# Patient Record
Sex: FEMALE | Race: WHITE | NOT HISPANIC OR LATINO | Employment: OTHER | URBAN - METROPOLITAN AREA
[De-identification: names, ages, dates, MRNs, and addresses within clinical notes are randomized per-mention and may not be internally consistent; named-entity substitution may affect disease eponyms.]

---

## 2017-07-20 ENCOUNTER — OFFICE VISIT (OUTPATIENT)
Dept: FAMILY MEDICINE | Facility: CLINIC | Age: 62
End: 2017-07-20
Payer: COMMERCIAL

## 2017-07-20 ENCOUNTER — HOSPITAL ENCOUNTER (OUTPATIENT)
Dept: ULTRASOUND IMAGING | Facility: CLINIC | Age: 62
Discharge: HOME | End: 2017-07-20
Attending: FAMILY MEDICINE | Admitting: FAMILY MEDICINE
Payer: COMMERCIAL

## 2017-07-20 VITALS
WEIGHT: 199.6 LBS | HEIGHT: 70 IN | DIASTOLIC BLOOD PRESSURE: 79 MMHG | HEART RATE: 81 BPM | TEMPERATURE: 97.6 F | SYSTOLIC BLOOD PRESSURE: 136 MMHG | BODY MASS INDEX: 28.58 KG/M2

## 2017-07-20 DIAGNOSIS — M79.651 PAIN IN RIGHT THIGH: Primary | ICD-10-CM

## 2017-07-20 DIAGNOSIS — M79.651 PAIN IN RIGHT THIGH: ICD-10-CM

## 2017-07-20 DIAGNOSIS — I80.01 SUPERFICIAL PHLEBITIS AND THROMBOPHLEBITIS OF RIGHT LOWER EXTREMITY: ICD-10-CM

## 2017-07-20 PROCEDURE — 99214 OFFICE O/P EST MOD 30 MIN: CPT | Performed by: FAMILY MEDICINE

## 2017-07-20 PROCEDURE — 93971 EXTREMITY STUDY: CPT | Mod: RT

## 2017-07-20 NOTE — NURSING NOTE
"Chief Complaint   Patient presents with     Bleeding/Bruising     Patient states that she has a vein that has a bump on it with bruising around it.       Initial /79  Pulse 81  Temp 97.6  F (36.4  C) (Tympanic)  Ht 5' 10\" (1.778 m)  Wt 199 lb 9.6 oz (90.5 kg)  BMI 28.64 kg/m2 Estimated body mass index is 28.64 kg/(m^2) as calculated from the following:    Height as of this encounter: 5' 10\" (1.778 m).    Weight as of this encounter: 199 lb 9.6 oz (90.5 kg).  Medication Reconciliation: complete   Betty Dove CMA      "

## 2017-07-20 NOTE — PATIENT INSTRUCTIONS
Superficial Thrombophlebitis    The superficial veins are the veins near the surface of the skin. Superficial thrombophlebitis is a problem that occurs when one or more of these veins become inflamed (red, irritated, and swollen). This is most often due to a blood clot.  Causes  The problem may occur after injury to a vein. It may also occur after having an intravenous (IV) line placed. Other factors that can make the problem more likely include:    Varicose veins    Venous insufficiency    Bleeding disorders    Prolonged periods of rest and not moving around    IV drug abuse  Symptoms  Symptoms may appear in the affected area. They can include:    Pain    Tenderness    Redness    Warmth    Swelling    Hardening of the vein  In most cases, superficial thrombophlebitis resolves on its own with no problems. Treatment is focused on relieving symptoms.  Sometimes, there is a risk that the deep veins in the body may also be involved. This can lead to more serious problems. In such cases, further testing and treatments may be needed. Your healthcare provider can tell you more about this.  Home Care  To help relieve pain and swelling, you may be advised to:    Apply heat or cold to the affected area. Do this for up to 10 minutes as often as directed.    Heat: Use a warm compress, such as a heating pad.    Cold: Use a cold compress, such as a cold pack or bag of ice wrapped in a thin towel.    Take nonsteroidal anti-inflammatory drugs (NSAIDS), such as ibuprofen. In some cases, other pain medicines may be prescribed.    Keep the affected limb (arm or leg) raised above heart level as directed.    Wear elastic compression stockings or bandages as directed.    Avoid prolonged sitting or standing. Get up and walk often.  To help treat a blood clot, a blood thinner (anticoagulant) may be prescribed. If this is needed, be sure to take the medicine exactly as directed.  Follow-up care  Follow up with your healthcare provider as  advised. If imaging tests are done, they will be reviewed by a doctor. You ll be told the results and any new findings that may affect your treatment.  When to seek medical advice  Call your healthcare provider right away if any of these occur:    Fever of 100.4 F (38 C) or higher, or as directed by your provider    Increasing pain, swelling, or tenderness in the affected area    Spreading warmth or redness in the affected area  Call 911  Call 911 right away if any of these occur:    Trouble breathing    Chest pain or discomfort that worsens with deep breathing or coughing    Coughing (may cough up blood)    Fast or irregular heartbeat    Sweating    Anxiety    Lightheadedness, dizziness, or fainting    Extreme confusion    Extreme drowsiness or trouble waking up    New pain in the chest, arm, shoulder, neck, or upper back  Date Last Reviewed: 9/21/2015 2000-2017 [employer]. [address] [place] [address], [place] [address]. All rights reserved. This information is not intended as a substitute for professional medical care. Always follow your healthcare professional's instructions.

## 2017-07-20 NOTE — PROGRESS NOTES
SUBJECTIVE:                                                    Ina Otoole is a 62 year old female who presents to clinic today for the following health issues:    Concern - bump on vein in upper right thigh   Onset: 4 days ago    Description:   Bump on vein with bruising around the bump.  Pt. States that it is painful all the time. On a pain scale pain is 6/10    Intensity: 6/10    Progression of Symptoms:  worsening    Accompanying Signs & Symptoms:  Bruising, pain    Previous history of similar problem:   none    Precipitating factors:   Worsened by: contact    Alleviating factors:  Improved by: nothing    Therapies Tried and outcome: none      62 yr old female with history of varicose veins. Patient says that the last four days she has noticed a very painful area on her right upper thigh. She does not recollect any trauma. Area is very tender and swollen and close to the varicose veins.No systemic symptoms reported.     Problem list and histories reviewed & adjusted, as indicated.  Additional history: as documented    Patient Active Problem List   Diagnosis     Esophageal reflux     Myalgia and myositis     Goiter     Pain in joint, lower leg     OA (osteoarthritis) of knee     CARDIOVASCULAR SCREENING; LDL GOAL LESS THAN 160     Calculus of gallbladder with other cholecystitis, without mention of obstruction     Renal calculus or stone     IBS (irritable bowel syndrome)     Advanced directives, counseling/discussion     Preop general physical exam     Past Surgical History:   Procedure Laterality Date     ARTHROSCOPY KNEE RT/LT      left - meniscus repair     KNEE SURGERY  9/2013    left knee replacement     LAPAROSCOPIC CHOLECYSTECTOMY  6/29/2011    Procedure:LAPAROSCOPIC CHOLECYSTECTOMY; Surgeon:SUMAYA ZAMORANO; Location:[facility]     STONE ANALYSIS  2011     THYROID BIOPSY         Social History   Substance Use Topics     Smoking status: Former Smoker     Quit date: 2/22/2000     Smokeless  tobacco: Never Used     Alcohol use Yes      Comment: only for special occassions     Family History   Problem Relation Age of Onset     DIABETES Mother      Hypertension Mother      CEREBROVASCULAR DISEASE Mother      CANCER Mother      melanoma     Respiratory Father      copd     C.A.D. Father      Cancer - colorectal Father      Breast Cancer Maternal Grandmother      DIABETES Paternal Grandmother      Hypertension Brother      Hypertension Sister      DIABETES Sister      Neurologic Disorder Sister      ms     Asthma No family hx of      Prostate Cancer No family hx of          Current Outpatient Prescriptions   Medication Sig Dispense Refill     Ibuprofen (ADVIL PO) Take 200 mg by mouth as needed for moderate pain       metroNIDAZOLE (FLAGYL) 500 MG tablet Take 1 tablet (500 mg) by mouth 2 times daily (Patient taking differently: Take 500 mg by mouth as needed ) 14 tablet 0     ranitidine (ZANTAC) 150 MG tablet Take 1 tablet (150 mg) by mouth 2 times daily (Needs follow-up appointment for this medication) 60 tablet 0     VITAMIN D PO Take 5,000 Units by mouth daily.       polyethylene glycol (MIRALAX) powder Take 17 g by mouth daily. STIR 1 CAPFUL (17GM) IN 8 OZ OF LIQUID AND DRINK, patient uses this as needed (Patient taking differently: Take 17 g by mouth as needed STIR 1 CAPFUL (17GM) IN 8 OZ OF LIQUID AND DRINK, patient uses this as needed) 510 g 12     ondansetron (ZOFRAN) 4 MG tablet Take 1 tablet (4 mg) by mouth every 8 hours as needed (Patient not taking: Reported on 7/20/2017) 18 tablet 0     calcipotriene (DOVONOX) 0.005 % SOLN Apply twice daily to affected nails as needed. (Patient not taking: Reported on 7/20/2017) 60 mL 3     multivitamin, therapeutic with minerals (MULTI-VITAMIN) TABS Take 1 tablet by mouth daily       Allergies   Allergen Reactions     Sulfa Drugs Hives         Reviewed and updated as needed this visit by clinical staff     Reviewed and updated as needed this visit by  "Provider         ROS:  Constitutional, HEENT, cardiovascular, pulmonary, gi and gu systems are negative, except as otherwise noted.      OBJECTIVE:   /79  Pulse 81  Temp 97.6  F (36.4  C) (Tympanic)  Ht 5' 10\" (1.778 m)  Wt 199 lb 9.6 oz (90.5 kg)  BMI 28.64 kg/m2  Body mass index is 28.64 kg/(m^2).  GENERAL: healthy, alert and no distress  MS: tenderness to palpation on upper right thigh, varicose vein seen, red and raised.     Diagnostic Test Results:  Ultrasound showed no DVT   ASSESSMENT/PLAN:         (M79.651) Pain in right thigh  (primary encounter diagnosis)  Comment: referred for ultrasound  Plan: US Lower Extremity Venous Duplex Right                (I80.01) Superficial phlebitis and thrombophlebitis of right lower extremity  Comment: Ultrasound negative for DVT  Plan: Asked that patient take some aspirin. Ice and compression stockings.       FUTURE APPOINTMENTS:       - Follow-up visit as needed.    Verito Ashby MD  [facility]  "

## 2017-07-20 NOTE — MR AVS SNAPSHOT
After Visit Summary   7/20/2017    Ina Otoole    MRN: [id number]           Patient Information     Date Of Birth          1955        Visit Information        Provider Department      7/20/2017 8:40 AM Verito Ashby MD [facility]        Today's Diagnoses     Pain in right thigh    -  1      Care Instructions      Superficial Thrombophlebitis    The superficial veins are the veins near the surface of the skin. Superficial thrombophlebitis is a problem that occurs when one or more of these veins become inflamed (red, irritated, and swollen). This is most often due to a blood clot.  Causes  The problem may occur after injury to a vein. It may also occur after having an intravenous (IV) line placed. Other factors that can make the problem more likely include:    Varicose veins    Venous insufficiency    Bleeding disorders    Prolonged periods of rest and not moving around    IV drug abuse  Symptoms  Symptoms may appear in the affected area. They can include:    Pain    Tenderness    Redness    Warmth    Swelling    Hardening of the vein  In most cases, superficial thrombophlebitis resolves on its own with no problems. Treatment is focused on relieving symptoms.  Sometimes, there is a risk that the deep veins in the body may also be involved. This can lead to more serious problems. In such cases, further testing and treatments may be needed. Your healthcare provider can tell you more about this.  Home Care  To help relieve pain and swelling, you may be advised to:    Apply heat or cold to the affected area. Do this for up to 10 minutes as often as directed.    Heat: Use a warm compress, such as a heating pad.    Cold: Use a cold compress, such as a cold pack or bag of ice wrapped in a thin towel.    Take nonsteroidal anti-inflammatory drugs (NSAIDS), such as ibuprofen. In some cases, other pain medicines may be prescribed.    Keep the affected limb (arm or leg) raised  above heart level as directed.    Wear elastic compression stockings or bandages as directed.    Avoid prolonged sitting or standing. Get up and walk often.  To help treat a blood clot, a blood thinner (anticoagulant) may be prescribed. If this is needed, be sure to take the medicine exactly as directed.  Follow-up care  Follow up with your healthcare provider as advised. If imaging tests are done, they will be reviewed by a doctor. You ll be told the results and any new findings that may affect your treatment.  When to seek medical advice  Call your healthcare provider right away if any of these occur:    Fever of 100.4 F (38 C) or higher, or as directed by your provider    Increasing pain, swelling, or tenderness in the affected area    Spreading warmth or redness in the affected area  Call 911  Call 911 right away if any of these occur:    Trouble breathing    Chest pain or discomfort that worsens with deep breathing or coughing    Coughing (may cough up blood)    Fast or irregular heartbeat    Sweating    Anxiety    Lightheadedness, dizziness, or fainting    Extreme confusion    Extreme drowsiness or trouble waking up    New pain in the chest, arm, shoulder, neck, or upper back  Date Last Reviewed: 9/21/2015 2000-2017 [employer]. [address] [place] [address]. All rights reserved. This information is not intended as a substitute for professional medical care. Always follow your healthcare professional's instructions.                Follow-ups after your visit        Your next 10 appointments already scheduled     Jul 20, 2017 10:30 AM CDT   US LOWER EXTREMITY VENOUS DUPLEX RIGHT with [address] Ultrasound ([facility])    [address] [facility] [id number] [phone]           Please bring a list of your medicines (including vitamins, minerals and over-the-counter drugs). Also, tell your doctor about any allergies you may have. Wear  "comfortable clothes and leave your valuables at home.  You do not need to do anything special to prepare for your exam.  Please call the Imaging Department at your exam site with any questions.              Future tests that were ordered for you today     Open Future Orders        Priority Expected Expires Ordered    US Lower Extremity Venous Duplex Right STAT  2018            Who to contact     If you have questions or need follow up information about today's clinic visit or your schedule please contact [facility] directly at [phone].  Normal or non-critical lab and imaging results will be communicated to you by [employer], letter or phone within 4 business days after the clinic has received the results. If you do not hear from us within 7 days, please contact the clinic through [employer] or phone. If you have a critical or abnormal lab result, we will notify you by phone as soon as possible.  Submit refill requests through [facility] or call your pharmacy and they will forward the refill request to us. Please allow 3 business days for your refill to be completed.          Additional Information About Your Visit        [employer] Database Information     [facility] lets you send messages to your doctor, view your test results, renew your prescriptions, schedule appointments and more. To sign up, go to [email or web address] . Click on \"Log in\" on the left side of the screen, which will take you to the Welcome page. Then click on \"Sign up Now\" on the right side of the page.     You will be asked to enter the access code listed below, as well as some personal information. Please follow the directions to create your username and password.     Your access code is: [email or web address]  Expires: 10/18/2017 10:15 AM     Your access code will  in 90 days. If you need help or a new code, please call your [facility] or [phone].        Care EveryWhere ID     This is your Care EveryWhere ID. This could be " "used by other organizations to access your [facility] medical records  [id number]        Your Vitals Were     Pulse Temperature Height BMI (Body Mass Index)          81 97.6  F (36.4  C) (Tympanic) 5' 10\" (1.778 m) 28.64 kg/m2         Blood Pressure from Last 3 Encounters:   07/20/17 136/79   08/29/16 106/83   07/26/16 128/80    Weight from Last 3 Encounters:   07/20/17 199 lb 9.6 oz (90.5 kg)   08/29/16 211 lb 9.6 oz (96 kg)   06/30/16 215 lb (97.5 kg)                 Today's Medication Changes          These changes are accurate as of: 7/20/17 10:15 AM.  If you have any questions, ask your nurse or doctor.               These medicines have changed or have updated prescriptions.        Dose/Directions    metroNIDAZOLE 500 MG tablet   Commonly known as:  FLAGYL   This may have changed:    - when to take this  - reasons to take this   Used for:  Bacterial vaginosis        Dose:  500 mg   Take 1 tablet (500 mg) by mouth 2 times daily   Quantity:  14 tablet   Refills:  0       polyethylene glycol powder   Commonly known as:  MIRALAX   This may have changed:    - when to take this  - reasons to take this  - additional instructions   Used for:  Constipation        Dose:  17 g   Take 17 g by mouth daily. STIR 1 CAPFUL (17GM) IN 8 OZ OF LIQUID AND DRINK, patient uses this as needed   Quantity:  510 g   Refills:  12                Primary Care Provider Office Phone # Fax #    Verito Bennie Ashby [phone]       [facility] [address] [facility] [address]        Equal Access to Services     GRUPO ANDERSON AH: Hadii annette pascual Sobonny, waaxda luqadaha, qaybta kaalmada adeegyada, ronald mays. So [facility] [phone].    ATENCIÓN: Si habla español, tiene a shipman disposición servicios gratuitos de asistencia lingüística. Llame al [phone].    We comply with applicable federal civil rights laws and [place] laws. We do not discriminate on the basis of race, " color, national origin, age, disability sex, sexual orientation or gender identity.            Thank you!     Thank you for choosing [facility]  for your care. Our goal is always to provide you with excellent care. Hearing back from our patients is one way we can continue to improve our services. Please take a few minutes to complete the written survey that you may receive in the mail after your visit with us. Thank you!             Your Updated Medication List - Protect others around you: Learn how to safely use, store and throw away your medicines at www.disposemymeds.org.          This list is accurate as of: 7/20/17 10:15 AM.  Always use your most recent med list.                   Brand Name Dispense Instructions for use Diagnosis    ADVIL PO      Take 200 mg by mouth as needed for moderate pain        calcipotriene 0.005 % Soln solution    DOVONOX    60 mL    Apply twice daily to affected nails as needed.    Psoriasis of nail       metroNIDAZOLE 500 MG tablet    FLAGYL    14 tablet    Take 1 tablet (500 mg) by mouth 2 times daily    Bacterial vaginosis       Multi-vitamin Tabs tablet      Take 1 tablet by mouth daily        ondansetron 4 MG tablet    ZOFRAN    18 tablet    Take 1 tablet (4 mg) by mouth every 8 hours as needed    Fever, unspecified, Nausea       polyethylene glycol powder    MIRALAX    510 g    Take 17 g by mouth daily. STIR 1 CAPFUL (17GM) IN 8 OZ OF LIQUID AND DRINK, patient uses this as needed    Constipation       ranitidine 150 MG tablet    ZANTAC    60 tablet    Take 1 tablet (150 mg) by mouth 2 times daily (Needs follow-up appointment for this medication)    Esophageal reflux       VITAMIN D PO      Take 5,000 Units by mouth daily.

## 2017-07-31 ENCOUNTER — OFFICE VISIT (OUTPATIENT)
Dept: FAMILY MEDICINE | Facility: CLINIC | Age: 62
End: 2017-07-31
Payer: COMMERCIAL

## 2017-07-31 ENCOUNTER — TELEPHONE (OUTPATIENT)
Dept: OTHER | Facility: CLINIC | Age: 62
End: 2017-07-31

## 2017-07-31 ENCOUNTER — HOSPITAL ENCOUNTER (OUTPATIENT)
Dept: ULTRASOUND IMAGING | Facility: CLINIC | Age: 62
Discharge: HOME | End: 2017-07-31
Attending: NURSE PRACTITIONER | Admitting: NURSE PRACTITIONER
Payer: COMMERCIAL

## 2017-07-31 VITALS
BODY MASS INDEX: 28.32 KG/M2 | TEMPERATURE: 96.6 F | WEIGHT: 197.8 LBS | DIASTOLIC BLOOD PRESSURE: 81 MMHG | SYSTOLIC BLOOD PRESSURE: 115 MMHG | HEIGHT: 70 IN | HEART RATE: 80 BPM

## 2017-07-31 DIAGNOSIS — M79.661 PAIN OF RIGHT LOWER LEG: Primary | ICD-10-CM

## 2017-07-31 DIAGNOSIS — I80.3 THROMBOPHLEBITIS LEG: ICD-10-CM

## 2017-07-31 DIAGNOSIS — M79.661 PAIN OF RIGHT LOWER LEG: ICD-10-CM

## 2017-07-31 PROCEDURE — 93971 EXTREMITY STUDY: CPT | Mod: RT

## 2017-07-31 PROCEDURE — 99213 OFFICE O/P EST LOW 20 MIN: CPT | Performed by: NURSE PRACTITIONER

## 2017-07-31 NOTE — PATIENT INSTRUCTIONS
Continue Aspirin 81 mg daily  Elevate legs  Compression stockings when pain better  Ice packs  Schedule with vascular doctor

## 2017-07-31 NOTE — PROGRESS NOTES
"  SUBJECTIVE:                                                    Ina Otoole is a 62 year old female who presents to clinic today for the following health issues: Concerns of possible additional blood clots in her right leg. Patient was diagnosed with superficial venous thrombus in a varicose vein in the mid thigh 7/20/17 and was started on 81mg of Aspirin. Patient noticed 2 new hard tender veins on her right leg; one on right calf area and other on right lateral side of thigh. Lump at the right calf area is mildly warm to touch and minimal swelling around it. Denies of any shortness of breath, chest pain, lightheadedness, dizziness, palpitation, nausea, and vomiting.     Concern - Bump on Vein on her lower right leg also one on her upper thigh  Onset: Friday evening     Description:   Red welt on Veins, sometimes it feels warm, hard to touch    Intensity: 6/10    Progression of Symptoms:  Worsening painful all the time     Accompanying Signs & Symptoms:  None    Previous history of similar problem:   Yes had one on 7/20    Precipitating factors:   Worsened by: Touch it, putting pressure on it     Alleviating factors:  Improved by: None    Therapies Tried and outcome: Aspirin, ice, keeping it raised       Problem list and histories reviewed & adjusted, as indicated.  Additional history: as documented    Labs reviewed in EPIC    Reviewed and updated as needed this visit by clinical staffTobacco  Allergies  Med Hx  Surg Hx  Fam Hx  Soc Hx      Reviewed and updated as needed this visit by Provider         ROS:  Constitutional, HEENT, cardiovascular, pulmonary, gi and gu systems are negative, except as otherwise noted.      OBJECTIVE:   /81  Pulse 80  Temp 96.6  F (35.9  C) (Tympanic)  Ht 5' 10\" (1.778 m)  Wt 197 lb 12.8 oz (89.7 kg)  BMI 28.38 kg/m2  Body mass index is 28.38 kg/(m^2).  GENERAL: healthy, alert and no distress  CV: Small hard and tender lumps at right lateral thigh and right calf " area. no peripheral edema and peripheral pulses strong  PSYCH: mentation appears normal, affect normal/bright    Diagnostic Test Results:  Repeat right leg ultrasound-pending    ASSESSMENT/PLAN:     1. Pain of right lower leg  -previous history of thrombophlebitis, due to worsening symptoms recommended to repeat venous US to rule out possible DVT  - US Lower Extremity Venous Duplex Right; Future  No DVT is demonstrated. Thrombosed superficial varicosities  in the medial right thigh are again demonstrated. In addition,  superficial thrombosed varicosities are seen in the medial upper calf  area and the lateral mid thigh area where the patient's two new  palpable complaints are.     IMPRESSION: Two new areas of superficial thrombophlebitis are seen as  well as one stable area, as described above.  See Patient Instructions  -US showed two new small areas of thrombophlebitis  -recommended to continue Aspirin 81 mg, the patient was asking me if she should take full 325 mg tablet daily instead, explained that there is no benefits in larger Aspirin dose according to research studies.   -ice packs as needed, may alternate with warm packs  -compression stockings when pain will be better  -follow up with vascular specialist     DORITA Chaidez [facility]

## 2017-07-31 NOTE — MR AVS SNAPSHOT
After Visit Summary   7/31/2017    Ina Otoole    MRN: [id number]           Patient Information     Date Of Birth          1955        Visit Information        Provider Department      7/31/2017 11:20 AM Keesha Smith APRN CNP [facility]        Today's Diagnoses     Pain of right lower leg    -  1    Thrombophlebitis leg (H)          Care Instructions    Continue Aspirin 81 mg daily  Elevate legs  Compression stockings when pain better  Ice packs  Schedule with vascular doctor              Follow-ups after your visit        Additional Services     VASCULAR REFERRAL       Your provider has referred you to the [facility].    Reason for Referral: Vascular Medicine    Urgency of Referral: Next available    Please be aware that coverage of these services is subject to the terms and limitations of your health insurance plan.  Call member services at your health plan with any benefit or coverage questions.      Please bring the following with you to your appointment:  (1) Any X-Rays, CTs or MRIs which have been performed.  Contact the facility where they were done to arrange for  prior to your scheduled appointment.  Any new CT, MRI or other procedures ordered by your specialist must be performed at a [place] facility or coordinated by your clinic's referral office.    (2) List of current medications   (3) This referral request   (4) Any documents/labs given to you for this referral                  Future tests that were ordered for you today     Open Future Orders        Priority Expected Expires Ordered    US Lower Extremity Venous Duplex Right STAT  7/31/2018 7/31/2017            Who to contact     If you have questions or need follow up information about today's clinic visit or your schedule please contact [facility] directly at [phone].  Normal or non-critical lab and imaging results will be communicated to you by  "MyChart, letter or phone within 4 business days after the clinic has received the results. If you do not hear from us within 7 days, please contact the clinic through Kingnethart or phone. If you have a critical or abnormal lab result, we will notify you by phone as soon as possible.  Submit refill requests through Edhub or call your pharmacy and they will forward the refill request to us. Please allow 3 business days for your refill to be completed.          Additional Information About Your Visit        Kingnethart Information     Edhub lets you send messages to your doctor, view your test results, renew your prescriptions, schedule appointments and more. To sign up, go to [email or web address] . Click on \"Log in\" on the left side of the screen, which will take you to the Welcome page. Then click on \"Sign up Now\" on the right side of the page.     You will be asked to enter the access code listed below, as well as some personal information. Please follow the directions to create your username and password.     Your access code is: [email or web address]  Expires: 10/18/2017 10:15 AM     Your access code will  in 90 days. If you need help or a new code, please call your [place] clinic or [phone].        Care EveryWhere ID     This is your Care EveryWhere ID. This could be used by other organizations to access your [place] medical records  [id number]        Your Vitals Were     Pulse Temperature Height BMI (Body Mass Index)          80 96.6  F (35.9  C) (Tympanic) 5' 10\" (1.778 m) 28.38 kg/m2         Blood Pressure from Last 3 Encounters:   17 115/81   17 136/79   16 106/83    Weight from Last 3 Encounters:   17 197 lb 12.8 oz (89.7 kg)   17 199 lb 9.6 oz (90.5 kg)   16 211 lb 9.6 oz (96 kg)              We Performed the Following     VASCULAR REFERRAL        Primary Care Provider Office Phone # Fax #    Verito Ashby [phone]       [place] " [facility] [address] [facility] [address]        Equal Access to Services     GRUPO ANDERSON : Hadii aad ku hadmimaroshan Sobonny, wajaquanda lurosalieadaha, qachilota kaalmanahomi blanco, ronald mays. So [facility] [phone].    ATENCIÓN: Si habla español, tiene a shipman disposición servicios gratuitos de asistencia lingüística. Llame al [phone].    We comply with applicable federal civil rights laws and [place] laws. We do not discriminate on the basis of race, color, national origin, age, disability sex, sexual orientation or gender identity.            Thank you!     Thank you for choosing [facility]  for your care. Our goal is always to provide you with excellent care. Hearing back from our patients is one way we can continue to improve our services. Please take a few minutes to complete the written survey that you may receive in the mail after your visit with us. Thank you!             Your Updated Medication List - Protect others around you: Learn how to safely use, store and throw away your medicines at www.disposemymeds.org.          This list is accurate as of: 7/31/17  4:05 PM.  Always use your most recent med list.                   Brand Name Dispense Instructions for use Diagnosis    ADVIL PO      Take 200 mg by mouth as needed for moderate pain        calcipotriene 0.005 % Soln solution    DOVONOX    60 mL    Apply twice daily to affected nails as needed.    Psoriasis of nail       metroNIDAZOLE 500 MG tablet    FLAGYL    14 tablet    Take 1 tablet (500 mg) by mouth 2 times daily    Bacterial vaginosis       Multi-vitamin Tabs tablet      Take 1 tablet by mouth daily        ondansetron 4 MG tablet    ZOFRAN    18 tablet    Take 1 tablet (4 mg) by mouth every 8 hours as needed    Fever, unspecified, Nausea       polyethylene glycol powder    MIRALAX    510 g    Take 17 g by mouth daily. STIR 1 CAPFUL (17GM) IN 8 OZ OF LIQUID AND DRINK, patient uses this as needed     Constipation       ranitidine 150 MG tablet    ZANTAC    60 tablet    Take 1 tablet (150 mg) by mouth 2 times daily (Needs follow-up appointment for this medication)    Esophageal reflux       VITAMIN D PO      Take 5,000 Units by mouth daily.

## 2017-07-31 NOTE — NURSING NOTE
"Chief Complaint   Patient presents with     Leg Pain     Right lower leg pain        Initial /81  Pulse 80  Temp 96.6  F (35.9  C) (Tympanic)  Ht 5' 10\" (1.778 m)  Wt 197 lb 12.8 oz (89.7 kg)  BMI 28.38 kg/m2 Estimated body mass index is 28.38 kg/(m^2) as calculated from the following:    Height as of this encounter: 5' 10\" (1.778 m).    Weight as of this encounter: 197 lb 12.8 oz (89.7 kg).  Medication Reconciliation: complete  "

## 2017-07-31 NOTE — TELEPHONE ENCOUNTER
Pt referred to [facility] by Keesha Smith CNP for thrombophlebitis and pain of right lower extremity.    Pt had venous US 7/31/17 to r/u DVT:  FINDINGS: No DVT is demonstrated. Thrombosed superficial varicosities  in the medial right thigh are again demonstrated. In addition,  superficial thrombosed varicosities are seen in the medial upper calf  area and the lateral mid thigh area where the patient's two new  palpable complaints are.    Pt needs to be scheduled for a consult with Vascular Surgery (in [place]).  Will route to scheduling to coordinate an appointment next available.    Tere Hernandez RN BSN

## 2017-08-15 ENCOUNTER — OFFICE VISIT (OUTPATIENT)
Dept: VASCULAR SURGERY | Facility: CLINIC | Age: 62
End: 2017-08-15
Payer: COMMERCIAL

## 2017-08-15 VITALS — HEART RATE: 81 BPM | SYSTOLIC BLOOD PRESSURE: 123 MMHG | DIASTOLIC BLOOD PRESSURE: 78 MMHG | RESPIRATION RATE: 16 BRPM

## 2017-08-15 DIAGNOSIS — I83.891 VARICOSE VEINS OF RIGHT LOWER EXTREMITY WITH COMPLICATIONS: Primary | ICD-10-CM

## 2017-08-15 DIAGNOSIS — Z12.11 SPECIAL SCREENING FOR MALIGNANT NEOPLASMS, COLON: Primary | ICD-10-CM

## 2017-08-15 PROCEDURE — 99204 OFFICE O/P NEW MOD 45 MIN: CPT | Performed by: SURGERY

## 2017-08-15 NOTE — PROGRESS NOTES
Presenting complaint: Superficial thrombophlebitis in the right lower extremity.  History of presenting illness: Mrs. Otoole is a 62-year-old female.  On July 20, 2017 she developed pain and redness in the right thigh area.  This was an area where she previously had varicose veins.  Prior to this episode she had not had any symptoms from the varicose veins.  She is started on aspirin and compression.  Her symptoms actually worsened and on 21 July she developed another episode of phlebitis in varicose veins located in the lower right leg area.  There was no trauma, immobilization, illness or long travel involved.  She has had varicose veins in both lower extremities for most of her adult life and they have been asymptomatic.    Past medical history: Gastroesophageal reflux disease.    Past surgical history:  1.  Thyroid biopsy  2.  Laparoscopic cholecystectomy  3.  Left knee arthroplasty  4.  Bilateral knee arthroscopies    Social history: She quit smoking in 2000.  She occasionally consumes alcohol.    Family history: Her mother had developed melanoma and cerebrovascular disease.  Her father had colorectal cancer.    Review of systems: Patient tells me that she has lost about 20 pounds in the last 3 months.  This she attributes to intentional weight loss since she joined [employer].  She does not report any new lumps in the breast.  She does report constipation and change of bowel habits.  With her [email or web address] diet she has been eating increasing amounts of fibrous fruits and raw vegetables and has increased her fiber intake yet her constipation is getting worse.  She does not report any melena or hematochezia.  Review of systems otherwise negative.    On examination: She appears comfortable and she is in no acute distress.  Vital signs are reviewed.  She is pleasant and cooperative.  HEENT: Head is atraumatic and normocephalic.  Mucosae are pink.  Eyes: Extraocular motions are intact.  Sclerae are  anicteric.  Mental: Alert and oriented ×4.  Judgment and insight are excellent.  Lymphatic: No supraclavicular or cervical adenopathy noted.  Cardiac: Regular rate and rhythm, S1 plus S2 +0.  Respiratory: Clear to auscultation bilaterally.  No accessory muscle use.  Abdomen: Soft and nontender.  No hepatosplenomegaly is noted.  Extremities: No clubbing, cyanosis, deformities or amputations.  Bilateral lower extremities have prominent varicose veins.  In the right thigh there are varicose veins which are firm consistent with recent thrombophlebitis.  There is a smaller cluster of varicose veins in the right calf area consistent with recent thrombophlebitis.  Left lower extremity varicose veins do not exhibit any signs of thrombophlebitis.    Diagnosis: right lower extremity superficial thrombophlebitis, varicose veins.    Plan: I spent the pathophysiology of disease to Mrs. Otoole in detail.  We will interrogate her right proximity venous system to assess for reflux disease.  I am however concerned about the unprovoked nature of 2 episodes of thrombophlebitis.  Her constipation and recent change in bowel habits are concerning.  I requested Dr. Escobedo from surgery to perform a screening colonoscopy.  He has very kindly agreed.  I also advised the patient that she should undergo a colonoscopy and she is agreeable.  I will schedule her for a visit to the [place] in clinic for reflux studies of the right lower extremity.

## 2017-08-15 NOTE — NURSING NOTE
"Chief Complaint   Patient presents with     Consult     veins right leg       Initial /78 (BP Location: Right arm, Patient Position: Chair, Cuff Size: Adult Regular)  Pulse 81  Resp 16 Estimated body mass index is 28.38 kg/(m^2) as calculated from the following:    Height as of 7/31/17: 1.778 m (5' 10\").    Weight as of 7/31/17: 89.7 kg (197 lb 12.8 oz).  Medication Reconciliation: complete  Patient is here for a consult veins right leg.  joe rangel LPN    "

## 2017-08-16 ENCOUNTER — TELEPHONE (OUTPATIENT)
Dept: SURGERY | Facility: CLINIC | Age: 62
End: 2017-08-16

## 2017-08-16 NOTE — TELEPHONE ENCOUNTER
Spoke with the pt about colonoscopy ordered per Dr Escobedo.  I gave the pt the number to call and schedule at her convenience and discussed home prep.  I also mailed out the instructions.  Pt had no questions and expressed understanding.  She did ask that Dr Gutierrez be notified of the results when completed.  I will that his nurse know to watch for it.    Nancie Sepulveda, CMA

## 2017-08-21 ENCOUNTER — TELEPHONE (OUTPATIENT)
Dept: FAMILY MEDICINE | Facility: CLINIC | Age: 62
End: 2017-08-21

## 2017-08-21 DIAGNOSIS — Z12.11 ENCOUNTER FOR SCREENING COLONOSCOPY: Primary | ICD-10-CM

## 2017-08-21 NOTE — TELEPHONE ENCOUNTER
Pt called and has a colon on Thurs 8/24/2017 and has a fake knee and wants to know if she needs to take a ABX..  Jennifer Sterling CSS

## 2017-08-21 NOTE — TELEPHONE ENCOUNTER
Please see note below  Are ABX advised for prosthetic knee for colonoscopy 8/24/17?  Please advise    Routing to provider.    Lelia GALVEZ Rn

## 2017-08-22 RX ORDER — AMOXICILLIN 500 MG/1
2000 CAPSULE ORAL ONCE
Qty: 4 CAPSULE | Refills: 0 | Status: SHIPPED | OUTPATIENT
Start: 2017-08-22 | End: 2017-08-22

## 2017-08-23 ENCOUNTER — ANESTHESIA EVENT (OUTPATIENT)
Dept: GASTROENTEROLOGY | Facility: CLINIC | Age: 62
End: 2017-08-23
Payer: COMMERCIAL

## 2017-08-23 ASSESSMENT — LIFESTYLE VARIABLES: TOBACCO_USE: 1

## 2017-08-23 NOTE — ANESTHESIA PREPROCEDURE EVALUATION
Anesthesia Evaluation     . Pt has had prior anesthetic.            ROS/MED HX    ENT/Pulmonary:     (+)tobacco use, Past use , . .    Neurologic:       Cardiovascular:         METS/Exercise Tolerance:     Hematologic:         Musculoskeletal: Comment: Myalgias and myositis  (+) arthritis, , , -       GI/Hepatic:     (+) GERD Other GI/Hepatic IBS      Renal/Genitourinary:         Endo:     (+) thyroid problem (goiter) .      Psychiatric:         Infectious Disease:         Malignancy:         Other:                     Physical Exam  Normal systems: cardiovascular, pulmonary and dental    Airway   Mallampati: I  TM distance: >3 FB  Neck ROM: full    Dental     Cardiovascular   Rhythm and rate: regular and normal      Pulmonary    breath sounds clear to auscultation                    Anesthesia Plan      History & Physical Review  History and physical reviewed and following examination; no interval change.    ASA Status:  2 .    NPO Status:  > 6 hours    Plan for MAC          Postoperative Care      Consents  Anesthetic plan, risks, benefits and alternatives discussed with:  Patient..                          .

## 2017-08-24 ENCOUNTER — HOSPITAL ENCOUNTER (OUTPATIENT)
Facility: CLINIC | Age: 62
Discharge: HOME | End: 2017-08-24
Attending: SURGERY | Admitting: SURGERY
Payer: COMMERCIAL

## 2017-08-24 ENCOUNTER — ANESTHESIA (OUTPATIENT)
Dept: GASTROENTEROLOGY | Facility: CLINIC | Age: 62
End: 2017-08-24
Payer: COMMERCIAL

## 2017-08-24 ENCOUNTER — SURGERY (OUTPATIENT)
Age: 62
End: 2017-08-24

## 2017-08-24 VITALS
SYSTOLIC BLOOD PRESSURE: 120 MMHG | WEIGHT: 197.8 LBS | HEIGHT: 70 IN | DIASTOLIC BLOOD PRESSURE: 79 MMHG | RESPIRATION RATE: 16 BRPM | OXYGEN SATURATION: 97 % | BODY MASS INDEX: 28.32 KG/M2 | HEART RATE: 97 BPM | TEMPERATURE: 98.4 F

## 2017-08-24 LAB — COLONOSCOPY: NORMAL

## 2017-08-24 PROCEDURE — 25000128 H RX IP 250 OP 636: Performed by: NURSE ANESTHETIST, CERTIFIED REGISTERED

## 2017-08-24 PROCEDURE — 45378 DIAGNOSTIC COLONOSCOPY: CPT | Performed by: SURGERY

## 2017-08-24 PROCEDURE — G0121 COLON CA SCRN NOT HI RSK IND: HCPCS | Performed by: SURGERY

## 2017-08-24 PROCEDURE — 25000128 H RX IP 250 OP 636: Performed by: SURGERY

## 2017-08-24 PROCEDURE — 37000008 ZZH ANESTHESIA TECHNICAL FEE, 1ST 30 MIN: Performed by: SURGERY

## 2017-08-24 PROCEDURE — 25000125 ZZHC RX 250: Performed by: SURGERY

## 2017-08-24 RX ORDER — PROPOFOL 10 MG/ML
INJECTION, EMULSION INTRAVENOUS PRN
Status: DISCONTINUED | OUTPATIENT
Start: 2017-08-24 | End: 2017-08-24

## 2017-08-24 RX ORDER — ONDANSETRON 2 MG/ML
4 INJECTION INTRAMUSCULAR; INTRAVENOUS
Status: DISCONTINUED | OUTPATIENT
Start: 2017-08-24 | End: 2017-08-24 | Stop reason: HOSPADM

## 2017-08-24 RX ORDER — SODIUM CHLORIDE, SODIUM LACTATE, POTASSIUM CHLORIDE, CALCIUM CHLORIDE 600; 310; 30; 20 MG/100ML; MG/100ML; MG/100ML; MG/100ML
INJECTION, SOLUTION INTRAVENOUS CONTINUOUS
Status: DISCONTINUED | OUTPATIENT
Start: 2017-08-24 | End: 2017-08-24 | Stop reason: HOSPADM

## 2017-08-24 RX ORDER — LIDOCAINE 40 MG/G
CREAM TOPICAL
Status: DISCONTINUED | OUTPATIENT
Start: 2017-08-24 | End: 2017-08-24 | Stop reason: HOSPADM

## 2017-08-24 RX ADMIN — PROPOFOL 100 MG: 10 INJECTION, EMULSION INTRAVENOUS at 13:24

## 2017-08-24 RX ADMIN — PROPOFOL 100 MG: 10 INJECTION, EMULSION INTRAVENOUS at 13:23

## 2017-08-24 RX ADMIN — LIDOCAINE HYDROCHLORIDE 1 ML: 10 INJECTION, SOLUTION EPIDURAL; INFILTRATION; INTRACAUDAL; PERINEURAL at 12:54

## 2017-08-24 RX ADMIN — PROPOFOL 100 MG: 10 INJECTION, EMULSION INTRAVENOUS at 13:22

## 2017-08-24 RX ADMIN — SODIUM CHLORIDE, POTASSIUM CHLORIDE, SODIUM LACTATE AND CALCIUM CHLORIDE: 600; 310; 30; 20 INJECTION, SOLUTION INTRAVENOUS at 12:54

## 2017-08-24 NOTE — ANESTHESIA POSTPROCEDURE EVALUATION
Patient: Ina Otoole    Procedure(s):  Colonoscopy   - Wound Class: II-Clean Contaminated    Diagnosis:screening  Diagnosis Additional Information: No value filed.    Anesthesia Type:  No value filed.    Note:  Anesthesia Post Evaluation    Patient location during evaluation: Bedside  Patient participation: Able to fully participate in evaluation  Level of consciousness: awake and alert  Pain management: adequate  Airway patency: patent  Cardiovascular status: acceptable  Respiratory status: acceptable  Hydration status: acceptable  PONV: none     Anesthetic complications: None          Last vitals:  Vitals:    08/24/17 1224   BP: 140/82   Pulse: 97   Resp: 16   Temp: 36.9  C (98.4  F)   SpO2: 96%         Electronically Signed By: DORITA Crabtree CRNA  August 24, 2017  1:33 PM

## 2017-08-24 NOTE — H&P
"62 year old year old female here for colonoscopy for screening.    Patient Active Problem List   Diagnosis     Esophageal reflux     Myalgia and myositis     Goiter     Pain in joint, lower leg     OA (osteoarthritis) of knee     CARDIOVASCULAR SCREENING; LDL GOAL LESS THAN 160     Calculus of gallbladder with other cholecystitis, without mention of obstruction     Renal calculus or stone     IBS (irritable bowel syndrome)     Advanced directives, counseling/discussion     Preop general physical exam       Past Medical History:   Diagnosis Date     Esophageal reflux      Goiter, unspecified      Myalgia and myositis, unspecified        Past Surgical History:   Procedure Laterality Date     ARTHROSCOPY KNEE RT/LT      left - meniscus repair     KNEE SURGERY  9/2013    left knee replacement     LAPAROSCOPIC CHOLECYSTECTOMY  6/29/2011    Procedure:LAPAROSCOPIC CHOLECYSTECTOMY; Surgeon:SUMAYA ZAMORANO; Location:[place] OR     STONE ANALYSIS  2011     THYROID BIOPSY         @[facility]@    No current outpatient prescriptions on file.       Allergies   Allergen Reactions     Sulfa Drugs Hives       Pt reports that she quit smoking about 17 years ago. She has never used smokeless tobacco. She reports that she drinks alcohol. She reports that she does not use illicit drugs.    Exam:  /82 (Cuff Size: Adult Large)  Pulse 97  Temp 98.4  F (36.9  C) (Oral)  Resp 16  Ht 1.778 m (5' 10\")  Wt 89.7 kg (197 lb 12.8 oz)  SpO2 96%  BMI 28.38 kg/m2    Awake, Alert OX3  Lungs - CTA bilaterally  CV - RRR, no murmurs, distal pulses intact  Abd - soft, non-distended, non-tender, +BS  Extr - No cyanosis or edema    A/P 62 year old year old female in need of colonoscopy for screening. Risks, benefits, alternatives, and complications were discussed including the possibility of perforation and the patient agreed to proceed    Abhishek Escobedo MD     "

## 2017-08-24 NOTE — ANESTHESIA POSTPROCEDURE EVALUATION
Patient: Ina Otoole    Procedure(s):  Colonoscopy   - Wound Class: II-Clean Contaminated    Diagnosis:screening  Diagnosis Additional Information: No value filed.    Anesthesia Type:  No value filed.    Note:  Anesthesia Post Evaluation    Patient location during evaluation: Bedside  Patient participation: Able to fully participate in evaluation  Level of consciousness: awake and alert  Pain management: adequate  Airway patency: patent  Cardiovascular status: acceptable  Respiratory status: acceptable  Hydration status: acceptable  PONV: none     Anesthetic complications: None          Last vitals:  Vitals:    08/24/17 1224   BP: 140/82   Pulse: 97   Resp: 16   Temp: 36.9  C (98.4  F)   SpO2: 96%         Electronically Signed By: DORITA Crabtree CRNA  August 24, 2017  1:31 PM

## 2017-08-28 DIAGNOSIS — Z12.11 ENCOUNTER FOR SCREENING COLONOSCOPY: ICD-10-CM

## 2017-08-28 NOTE — TELEPHONE ENCOUNTER
Amoxicillin      Last Written Prescription Date:  Not on med list  Last Fill Quantity: 0,   # refills: 0  Last Office Visit with [facility] prescribing provider: 07/20/2017  Future Office visit:       Routing refill request to provider for review/approval because:  Drug not on the [facility] refill protocol or controlled substance    David BRITTON (R)

## 2017-08-29 ENCOUNTER — APPOINTMENT (OUTPATIENT)
Dept: VASCULAR SURGERY | Facility: CLINIC | Age: 62
End: 2017-08-29
Payer: COMMERCIAL

## 2017-08-29 ENCOUNTER — OFFICE VISIT (OUTPATIENT)
Dept: VASCULAR SURGERY | Facility: CLINIC | Age: 62
End: 2017-08-29
Payer: COMMERCIAL

## 2017-08-29 DIAGNOSIS — I80.01 THROMBOPHLEBITIS OF SUPERFICIAL VEINS OF RIGHT LOWER EXTREMITY: Primary | ICD-10-CM

## 2017-08-29 PROCEDURE — 99212 OFFICE O/P EST SF 10 MIN: CPT | Performed by: SURGERY

## 2017-08-29 PROCEDURE — 93971 EXTREMITY STUDY: CPT | Performed by: SURGERY

## 2017-08-29 NOTE — LETTER
[facility] [place] [address]  [place] [address]  Phone: [phone]  Fax: [phone]      2017    RE:  Ina DOHERTY Xiang-:  [birth year]    Mrs. Otoole had developed right lower extremity superficial thrombophlebitis in a cluster of previously asymptomatic varicose veins. She recently underwent colonoscopy as well.  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  To prevent future episodes of thrombophlebitis, as that in itself increases risk of subsequent deep venous thrombosis, I would recommend radiofrequency ablation of the right great saphenous vein. I explained the rationale for that treatment and Mrs. Otoole is agreeable.     Danny Gutierrez MD

## 2017-08-29 NOTE — MR AVS SNAPSHOT
"              After Visit Summary   2017    Ina Otoole    MRN: [id number]           Patient Information     Date Of Birth          1955        Visit Information        Provider Department      2017 1:30 PM Danny Gutierrez MD [facility]      Today's Diagnoses     Thrombophlebitis of superficial veins of right lower extremity    -  1       Follow-ups after your visit        Who to contact     If you have questions or need follow up information about today's clinic visit or your schedule please contact [facility] directly at [phone].  Normal or non-critical lab and imaging results will be communicated to you by Ooploohart, letter or phone within 4 business days after the clinic has received the results. If you do not hear from us within 7 days, please contact the clinic through Ooploohart or phone. If you have a critical or abnormal lab result, we will notify you by phone as soon as possible.  Submit refill requests through Alert Logic or call your pharmacy and they will forward the refill request to us. Please allow 3 business days for your refill to be completed.          Additional Information About Your Visit        MyChart Information     Alert Logic lets you send messages to your doctor, view your test results, renew your prescriptions, schedule appointments and more. To sign up, go to [email or web address] . Click on \"Log in\" on the left side of the screen, which will take you to the Welcome page. Then click on \"Sign up Now\" on the right side of the page.     You will be asked to enter the access code listed below, as well as some personal information. Please follow the directions to create your username and password.     Your access code is: [email or web address]  Expires: 10/18/2017 10:15 AM     Your access code will  in 90 days. If you need help or a new code, please call your [place] clinic or [phone].   "      Care EveryWhere ID     This is your Care EveryWhere ID. This could be used by other organizations to access your [place] medical records  [id number]         Blood Pressure from Last 3 Encounters:   09/10/17 (!) 132/92   08/24/17 120/79   08/15/17 123/78    Weight from Last 3 Encounters:   09/10/17 188 lb (85.3 kg)   08/24/17 197 lb 12.8 oz (89.7 kg)   07/31/17 197 lb 12.8 oz (89.7 kg)              Today, you had the following     No orders found for display       Primary Care Provider Office Phone # Fax #    Deepikain Bennie Ashby [phone] [address] [facility] [address]        Equal Access to Services     GRUPO ANDERSON : Hadii annette osngo Sobonny, waaxda luqadaha, qaybta kaalmada adeegyada, ronald browne . So [facility] [phone].    ATENCIÓN: Si habla español, tiene a shipman disposición servicios gratuitos de asistencia lingüística. Llame al [phone].    We comply with applicable federal civil rights laws and [place] laws. We do not discriminate on the basis of race, color, national origin, age, disability sex, sexual orientation or gender identity.            Thank you!     Thank you for choosing [facility]  for your care. Our goal is always to provide you with excellent care. Hearing back from our patients is one way we can continue to improve our services. Please take a few minutes to complete the written survey that you may receive in the mail after your visit with us. Thank you!             Your Updated Medication List - Protect others around you: Learn how to safely use, store and throw away your medicines at www.disposemymeds.org.          This list is accurate as of: 8/29/17 11:59 PM.  Always use your most recent med list.                   Brand Name Dispense Instructions for use Diagnosis    ADVIL PO      Take 200 mg by mouth as needed for moderate pain        calcipotriene 0.005 % Soln solution    DOVONOX     60 mL    Apply twice daily to affected nails as needed.    Psoriasis of nail       Multi-vitamin Tabs tablet      Take 1 tablet by mouth daily        polyethylene glycol powder    MIRALAX    510 g    Take 17 g by mouth daily. STIR 1 CAPFUL (17GM) IN 8 OZ OF LIQUID AND DRINK, patient uses this as needed    Constipation       ranitidine 150 MG tablet    ZANTAC    60 tablet    Take 1 tablet (150 mg) by mouth 2 times daily (Needs follow-up appointment for this medication)    Esophageal reflux       VITAMIN D PO      Take 5,000 Units by mouth daily.

## 2017-09-05 RX ORDER — AMOXICILLIN 500 MG/1
CAPSULE ORAL
Qty: 4 CAPSULE | Refills: 0 | OUTPATIENT
Start: 2017-09-05

## 2017-09-05 NOTE — TELEPHONE ENCOUNTER
This was for the dentist for the Dr. Strong, her dentist.     This one was done in error.    Virginie BLOOM RN

## 2017-09-10 ENCOUNTER — HOSPITAL ENCOUNTER (EMERGENCY)
Facility: CLINIC | Age: 62
Discharge: HOME | End: 2017-09-10
Attending: FAMILY MEDICINE | Admitting: FAMILY MEDICINE
Payer: COMMERCIAL

## 2017-09-10 VITALS
SYSTOLIC BLOOD PRESSURE: 132 MMHG | WEIGHT: 188 LBS | BODY MASS INDEX: 26.98 KG/M2 | OXYGEN SATURATION: 98 % | TEMPERATURE: 98 F | DIASTOLIC BLOOD PRESSURE: 92 MMHG | HEART RATE: 98 BPM | RESPIRATION RATE: 18 BRPM

## 2017-09-10 DIAGNOSIS — J20.9 ACUTE BRONCHITIS, UNSPECIFIED ORGANISM: ICD-10-CM

## 2017-09-10 DIAGNOSIS — R05.8 RESPIRATORY TRACT CONGESTION WITH COUGH: ICD-10-CM

## 2017-09-10 PROCEDURE — 99213 OFFICE O/P EST LOW 20 MIN: CPT | Mod: Z6 | Performed by: FAMILY MEDICINE

## 2017-09-10 PROCEDURE — 99212 OFFICE O/P EST SF 10 MIN: CPT | Performed by: FAMILY MEDICINE

## 2017-09-10 RX ORDER — PREDNISONE 20 MG/1
TABLET ORAL
Qty: 5 TABLET | Refills: 0 | Status: SHIPPED | OUTPATIENT
Start: 2017-09-10 | End: 2017-09-17

## 2017-09-10 RX ORDER — FLUTICASONE PROPIONATE 50 MCG
1-2 SPRAY, SUSPENSION (ML) NASAL DAILY
Qty: 1 BOTTLE | Refills: 1 | Status: SHIPPED | OUTPATIENT
Start: 2017-09-10 | End: 2018-06-17

## 2017-09-10 NOTE — ED AVS SNAPSHOT
[facility] Emergency Department    [address] [address]    Phone:  [phone]    Fax:  [phone]                                       Ina Otoole   MRN: [id number]    Department:  [facility] Emergency Department   Date of Visit:  9/10/2017           After Visit Summary Signature Page     I have received my discharge instructions, and my questions have been answered. I have discussed any challenges I see with this plan with the nurse or doctor.    ..........................................................................................................................................  Patient/Patient Representative Signature      ..........................................................................................................................................  Patient Representative Print Name and Relationship to Patient    ..................................................               ................................................  Date                                            Time    ..........................................................................................................................................  Reviewed by Signature/Title    ...................................................              ..............................................  Date                                                            Time

## 2017-09-10 NOTE — ED PROVIDER NOTES
History     Chief Complaint   Patient presents with     Cough     cough and congestion for 4 weeks.      HPI  Ina Otoole is a 62 year old female who presents for evaluation of a cough.  She has not felt well in about 4 weeks now and her symptoms have been progressively worsening- congestion, fullness in her head and ears, facial pressure. Cough which is productive though she has not been looking at what she has been bring up. No associated chest pain, no fever, no known ill contacts, no history of seasonal allergies. Clear drainage from nostrils. Some intermittent wheezing.  She has tried Vicks and some over the counter cold medication- not helpful which is why she is here especially as she does not want her 91 yo mother whom she takes care of catching whatever she has.    I have reviewed the Medications, Allergies, Past Medical and Surgical History, and Social History in the Epic system.      Review of Systems   Otherwise negative       Physical Exam   BP: (!) 132/92  Pulse: 98  Temp: 98  F (36.7  C)  Resp: 18  Weight: 85.3 kg (188 lb)  SpO2: 98 %  Physical Exam   General - Awake and alert, not in any obvious distress. Afebrile, not pale well hydrated.  Head - Normocephalic, atraumatic.  ENT: nasal mucosa congestion with enlarged turbinates. Tympanic membrane slightly   Neck - No cervical adenopathy, thyromegally, JVD or carotid bruits noted.  Lungs - Clear to auscultation bilaterally, no wheezes, rales or rhonchi.  CV - Regular rate and rhythm, no murmurs, rubs or gallops.  Psych - Judgment and mental status are clear, patient has reasonable insight. Mood is stable.            ED Course     ED Course     Procedures        None       Labs Ordered and Resulted from Time of ED Arrival Up to the Time of Departure from the ED - No data to display    Assessments & Plan (with Medical Decision Making)     I have reviewed the nursing notes.    I have reviewed the findings, diagnosis, plan and need for follow up with  the patient.    New Prescriptions    FLUTICASONE (FLONASE) 50 MCG/ACT SPRAY    Spray 1-2 sprays into both nostrils daily    PREDNISONE (DELTASONE) 20 MG TABLET    1 tab daily for 3 days, then 1/2 tab daily for 4 days       Final diagnoses:   Respiratory tract congestion with cough   Acute bronchitis, unspecified organism     Diagnosis, differential diagnosis, treatment and prognosis discussed with patient    See below for summary of discussion(s) with patient:     the Flonase and use twice a day over the next week then use at night till you feel better then stop. Ok to also use on and off for allergy symptoms/ congestion     Prednisone and use in the morning with meals to help with pain and inflammation. Ok to discontinue this medication once you feel better.    Stay well hydrated.    Take tylenol/ibuprofen for pain as needed, while pain is severe, ok to alternate 1000mg Tylenol (acetaminophene) with 600mg ibuprofen (make sure you eat before taking ibuprofen) so that you are taking something every 4 hours.      Follow up as needed       9/10/2017   [facility] EMERGENCY DEPARTMENT     Uyen Mcgowan MD  09/10/17 0226

## 2017-09-10 NOTE — DISCHARGE INSTRUCTIONS
(R05) Respiratory tract congestion with cough  (J20.9) Acute bronchitis, unspecified organism     the Flonase and use twice a day over the next week then use at night till you feel better then stop. Ok to also use on and off for allergy symptoms/ congestion     Prednisone and use in the morning with meals to help with pain and inflammation. Ok to discontinue this medication once you feel better.    Stay well hydrated.    Take tylenol/ibuprofen for pain as needed, while pain is severe, ok to alternate 1000mg Tylenol (acetaminophene) with 600mg ibuprofen (make sure you eat before taking ibuprofen) so that you are taking something every 4 hours.      Follow up as needed           Bronchitis with Wheezing (Viral or Bacterial: Adult)    Bronchitis is an infection of the air passages. It often occurs during a cold and is usually caused by a virus. Symptoms include cough with mucus (phlegm) and low-grade fever. This illness is contagious during the first few days and is spread through the air by coughing and sneezing, or by direct contact (touching the sick person and then touching your own eyes, nose, or mouth).  If there is a lot of inflammation, air flow is restricted. The air passages may also go into spasm, especially if you have asthma. This causes wheezing and difficulty breathing even in people who do not have asthma.  Bronchitis usually lasts 7 to 14 days. The wheezing should improve with treatment during the first week. An inhaler is often prescribed to relax the air passages and stop wheezing. Antibiotics will be prescribed if your doctor thinks there is also a secondary bacterial infection.  Home care    If symptoms are severe, rest at home for the first 2 to 3 days. When you go back to your usual activities, don't let yourself get too tired.    Do not smoke. Also avoid being exposed to secondhand smoke.    You may use over-the-counter medicine to control fever or pain, unless another medicine was  prescribed. Note: If you have chronic liver or kidney disease or have ever had a stomach ulcer or gastrointestinal bleeding, talk with your healthcare provider before using these medicines. Also talk to your provider if you are taking medicine to prevent blood clots.) Aspirin should never be given to anyone younger than 18 years of age who is ill with a viral infection or fever. It may cause severe liver or brain damage.    Your appetite may be poor, so a light diet is fine. Avoid dehydration by drinking 6 to 8 glasses of fluids per day (such as water, soft drinks, sports drinks, juices, tea, or soup). Extra fluids will help loosen secretions in the nose and lungs.    Over-the-counter cough, cold, and sore-throat medicines will not shorten the length of the illness, but they may be helpful to reduce symptoms. (Note: Do not use decongestants if you have high blood pressure.)    If you were given an inhaler, use it exactly as directed. If you need to use it more often than prescribed, your condition may be worsening. If this happens, contact your healthcare provider.    If prescribed, finish all antibiotic medicine, even if you are feeling better after only a few days.  Follow-up care  Follow up with your healthcare provider, or as advised. If you had an X-ray or ECG (electrocardiogram), a specialist will review it. You will be notified of any new findings that may affect your care.  Note: If you are age 65 or older, or if you have a chronic lung disease or condition that affects your immune system, or you smoke, talk to your healthcare provider about having a pneumococcal vaccinations and a yearly influenza vaccination (flu shot).  When to seek medical advice  Call your healthcare provider right away if any of these occur:    Fever of 100.4 F (38 C) or higher    Coughing up increasing amounts of colored sputum    Weakness, drowsiness, headache, facial pain, ear pain, or a stiff neck  Call 911, or get immediate  medical care  Contact emergency services right away if any of these occur.    Coughing up blood    Worsening weakness, drowsiness, headache, or stiff neck    Increased wheezing not helped with medication, shortness of breath, or pain with breathing  Date Last Reviewed: 9/13/2015 2000-2017 [employer]. [address] [place] [address]. All rights reserved. This information is not intended as a substitute for professional medical care. Always follow your healthcare professional's instructions.

## 2017-09-11 NOTE — PROGRESS NOTES
Mrs. Otoole had developed right lower extremity superficial thrombophlebitis in a cluster of previously asymptomatic varicose veins. She recently underwent colonoscopy as well.  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  To prevent future episodes of thrombophlebitis, as that in itself increases risk of subsequent deep venous thrombosis, I would recommend radiofrequency ablation of the right great saphenous vein. I explained the rationale for that treatment and Mrs. Otoole is agreeable.

## 2017-10-10 ENCOUNTER — APPOINTMENT (OUTPATIENT)
Dept: VASCULAR SURGERY | Facility: CLINIC | Age: 62
End: 2017-10-10
Payer: COMMERCIAL

## 2017-10-10 PROCEDURE — 99207 ZZC VEINSOLUTIONS NO CHARGE VISIT: CPT | Performed by: SURGERY

## 2017-10-10 PROCEDURE — 36475 ENDOVENOUS RF 1ST VEIN: CPT | Mod: RT | Performed by: SURGERY

## 2017-10-10 PROCEDURE — 99207 ZZC VEINSOLUTIONS POST OPERATIVE VISIT: CPT | Performed by: SURGERY

## 2017-10-12 ENCOUNTER — APPOINTMENT (OUTPATIENT)
Dept: VASCULAR SURGERY | Facility: CLINIC | Age: 62
End: 2017-10-12
Payer: COMMERCIAL

## 2017-10-12 PROCEDURE — 99207 ZZC VEINSOLUTIONS 48 HOUR: CPT | Performed by: SURGERY

## 2017-10-12 PROCEDURE — 93971 EXTREMITY STUDY: CPT | Performed by: SURGERY

## 2017-12-01 ENCOUNTER — ALLIED HEALTH/NURSE VISIT (OUTPATIENT)
Dept: FAMILY MEDICINE | Facility: CLINIC | Age: 62
End: 2017-12-01
Payer: COMMERCIAL

## 2017-12-01 DIAGNOSIS — Z23 NEED FOR PROPHYLACTIC VACCINATION AND INOCULATION AGAINST INFLUENZA: Primary | ICD-10-CM

## 2017-12-01 PROCEDURE — 99207 ZZC NO CHARGE NURSE ONLY: CPT

## 2017-12-01 PROCEDURE — 90686 IIV4 VACC NO PRSV 0.5 ML IM: CPT

## 2017-12-01 PROCEDURE — 90471 IMMUNIZATION ADMIN: CPT

## 2017-12-01 NOTE — MR AVS SNAPSHOT
"              After Visit Summary   12/1/2017    Ina Otoole    MRN: [id number]           Patient Information     Date Of Birth          1955        Visit Information        Provider Department      12/1/2017 11:05 AM [facility] FLU SHOT [facility]        Today's Diagnoses     Need for prophylactic vaccination and inoculation against influenza    -  1       Follow-ups after your visit        Your next 10 appointments already scheduled     Dec 01, 2017 11:05 AM CST   Nurse Only with [facility] CLINIC   [facility]    [address] [address] [phone]              Who to contact     If you have questions or need follow up information about today's clinic visit or your schedule please contact [facility] directly at [phone].  Normal or non-critical lab and imaging results will be communicated to you by [employer], letter or phone within 4 business days after the clinic has received the results. If you do not hear from us within 7 days, please contact the clinic through [employer] or phone. If you have a critical or abnormal lab result, we will notify you by phone as soon as possible.  Submit refill requests through [employer] or call your pharmacy and they will forward the refill request to us. Please allow 3 business days for your refill to be completed.          Additional Information About Your Visit        MyChart Information     [employer] lets you send messages to your doctor, view your test results, renew your prescriptions, schedule appointments and more. To sign up, go to [email or web address] . Click on \"Log in\" on the left side of the screen, which will take you to the Welcome page. Then click on \"Sign up Now\" on the right side of the page.     You will be asked to enter the access code listed below, as well as some personal information. Please follow the directions to create your username and password.   "   Your access code is: [email or web address]  Expires: 3/1/2018 10:47 AM     Your access code will  in 90 days. If you need help or a new code, please call your [place] clinic or [phone].        Care EveryWhere ID     This is your Care EveryWhere ID. This could be used by other organizations to access your [place] medical records  [id number]         Blood Pressure from Last 3 Encounters:   09/10/17 (!) 132/92   17 120/79   08/15/17 123/78    Weight from Last 3 Encounters:   09/10/17 188 lb (85.3 kg)   17 197 lb 12.8 oz (89.7 kg)   17 197 lb 12.8 oz (89.7 kg)              We Performed the Following     FLU VAC, SPLIT VIRUS IM > 3 YO (QUADRIVALENT) [29677]     Vaccine Administration, Initial [83523]        Primary Care Provider Office Phone # Fax #    Clovisabdulaziz Bennie Ashby [phone] [address] [facility] [address]        Equal Access to Services     GRUPO ANDERSON AH: Hadii annette pascual Sobonny, waaxda luqadaha, qaybta kaalmanahomi blanco, ronald browne . So [facility] [phone].    ATENCIÓN: Si habla español, tiene a shipman disposición servicios gratuitos de asistencia lingüística. Llame al [phone].    We comply with applicable federal civil rights laws and [place] laws. We do not discriminate on the basis of race, color, national origin, age, disability, sex, sexual orientation, or gender identity.            Thank you!     Thank you for choosing [facility]  for your care. Our goal is always to provide you with excellent care. Hearing back from our patients is one way we can continue to improve our services. Please take a few minutes to complete the written survey that you may receive in the mail after your visit with us. Thank you!             Your Updated Medication List - Protect others around you: Learn how to safely use, store and throw away your medicines at www.disposemymeds.org.          This list is accurate as  of: 12/1/17 10:47 AM.  Always use your most recent med list.                   Brand Name Dispense Instructions for use Diagnosis    ADVIL PO      Take 200 mg by mouth as needed for moderate pain        ASPIRIN PO      Take 81 mg by mouth daily        fluticasone 50 MCG/ACT spray    FLONASE    1 Bottle    Spray 1-2 sprays into both nostrils daily        Multi-vitamin Tabs tablet      Take 1 tablet by mouth daily        polyethylene glycol powder    MIRALAX    510 g    Take 17 g by mouth daily. STIR 1 CAPFUL (17GM) IN 8 OZ OF LIQUID AND DRINK, patient uses this as needed    Constipation       ranitidine 150 MG tablet    ZANTAC    60 tablet    Take 1 tablet (150 mg) by mouth 2 times daily (Needs follow-up appointment for this medication)    Esophageal reflux       VITAMIN D PO      Take 5,000 Units by mouth daily.

## 2017-12-01 NOTE — PROGRESS NOTES
Injectable Influenza Immunization Documentation    1.  Is the person to be vaccinated sick today?   No    2. Does the person to be vaccinated have an allergy to a component   of the vaccine?   No  Egg Allergy Algorithm Link    3. Has the person to be vaccinated ever had a serious reaction   to influenza vaccine in the past?   No    4. Has the person to be vaccinated ever had Guillain-Barré syndrome?   No    Form completed by Ana Cristina QUINTANILLA CMA ([facility])

## 2017-12-05 NOTE — ED AVS SNAPSHOT
[facility] Emergency Department    [address] [address]    Phone:  [phone]    Fax:  [phone]                                       Ina Otoole   MRN: [id number]    Department:  [facility] Emergency Department   Date of Visit:  9/10/2017           Patient Information     Date Of Birth          1955        Your diagnoses for this visit were:     Respiratory tract congestion with cough     Acute bronchitis, unspecified organism        You were seen by Uyen Mcgowan MD.      Follow-up Information     Follow up with Verito Ashby MD.    Specialty:  Family Practice    Why:  As needed    Contact information:    [address] [id number] [phone]          Discharge Instructions       (R05) Respiratory tract congestion with cough  (J20.9) Acute bronchitis, unspecified organism     the Flonase and use twice a day over the next week then use at night till you feel better then stop. Ok to also use on and off for allergy symptoms/ congestion     Prednisone and use in the morning with meals to help with pain and inflammation. Ok to discontinue this medication once you feel better.    Stay well hydrated.    Take tylenol/ibuprofen for pain as needed, while pain is severe, ok to alternate 1000mg Tylenol (acetaminophene) with 600mg ibuprofen (make sure you eat before taking ibuprofen) so that you are taking something every 4 hours.      Follow up as needed           Bronchitis with Wheezing (Viral or Bacterial: Adult)    Bronchitis is an infection of the air passages. It often occurs during a cold and is usually caused by a virus. Symptoms include cough with mucus (phlegm) and low-grade fever. This illness is contagious during the first few days and is spread through the air by coughing and sneezing, or by direct contact (touching the sick person and then touching your own eyes, nose, or mouth).  If there is a lot of inflammation,  Problem: Impaired Activities of Daily Living  Goal: Achieve highest/safest level of independence in self care  Interventions:  - Assess ability and encourage patient to participate in ADLs to maximize function  - Promote sitting position while performing A air flow is restricted. The air passages may also go into spasm, especially if you have asthma. This causes wheezing and difficulty breathing even in people who do not have asthma.  Bronchitis usually lasts 7 to 14 days. The wheezing should improve with treatment during the first week. An inhaler is often prescribed to relax the air passages and stop wheezing. Antibiotics will be prescribed if your doctor thinks there is also a secondary bacterial infection.  Home care    If symptoms are severe, rest at home for the first 2 to 3 days. When you go back to your usual activities, don't let yourself get too tired.    Do not smoke. Also avoid being exposed to secondhand smoke.    You may use over-the-counter medicine to control fever or pain, unless another medicine was prescribed. Note: If you have chronic liver or kidney disease or have ever had a stomach ulcer or gastrointestinal bleeding, talk with your healthcare provider before using these medicines. Also talk to your provider if you are taking medicine to prevent blood clots.) Aspirin should never be given to anyone younger than 18 years of age who is ill with a viral infection or fever. It may cause severe liver or brain damage.    Your appetite may be poor, so a light diet is fine. Avoid dehydration by drinking 6 to 8 glasses of fluids per day (such as water, soft drinks, sports drinks, juices, tea, or soup). Extra fluids will help loosen secretions in the nose and lungs.    Over-the-counter cough, cold, and sore-throat medicines will not shorten the length of the illness, but they may be helpful to reduce symptoms. (Note: Do not use decongestants if you have high blood pressure.)    If you were given an inhaler, use it exactly as directed. If you need to use it more often than prescribed, your condition may be worsening. If this happens, contact your healthcare provider.    If prescribed, finish all antibiotic medicine, even if you are feeling better after only  a few days.  Follow-up care  Follow up with your healthcare provider, or as advised. If you had an X-ray or ECG (electrocardiogram), a specialist will review it. You will be notified of any new findings that may affect your care.  Note: If you are age 65 or older, or if you have a chronic lung disease or condition that affects your immune system, or you smoke, talk to your healthcare provider about having a pneumococcal vaccinations and a yearly influenza vaccination (flu shot).  When to seek medical advice  Call your healthcare provider right away if any of these occur:    Fever of 100.4 F (38 C) or higher    Coughing up increasing amounts of colored sputum    Weakness, drowsiness, headache, facial pain, ear pain, or a stiff neck  Call 911, or get immediate medical care  Contact emergency services right away if any of these occur.    Coughing up blood    Worsening weakness, drowsiness, headache, or stiff neck    Increased wheezing not helped with medication, shortness of breath, or pain with breathing  Date Last Reviewed: 9/13/2015 2000-2017 [employer]. [address] [place] [address]. All rights reserved. This information is not intended as a substitute for professional medical care. Always follow your healthcare professional's instructions.                24 Hour Appointment Hotline       To make an appointment at any [facility] clinic, call [phone] ([phone]). If you don't have a family doctor or clinic, we will help you find one. [facility] clinics are conveniently located to serve the needs of you and your family.             Review of your medicines      START taking        Dose / Directions Last dose taken    fluticasone 50 MCG/ACT spray   Commonly known as:  FLONASE   Dose:  1-2 spray   Quantity:  1 Bottle        Spray 1-2 sprays into both nostrils daily   Refills:  1        predniSONE 20 MG tablet   Commonly known as:  DELTASONE   Quantity:  5 tablet        1 tab daily  for 3 days, then 1/2 tab daily for 4 days   Refills:  0          Our records show that you are taking the medicines listed below. If these are incorrect, please call your family doctor or clinic.        Dose / Directions Last dose taken    ADVIL PO   Dose:  200 mg        Take 200 mg by mouth as needed for moderate pain   Refills:  0        ASPIRIN PO   Dose:  81 mg        Take 81 mg by mouth daily   Refills:  0        Multi-vitamin Tabs tablet   Dose:  1 tablet        Take 1 tablet by mouth daily   Refills:  0        polyethylene glycol powder   Commonly known as:  MIRALAX   Dose:  17 g   Quantity:  510 g        Take 17 g by mouth daily. STIR 1 CAPFUL (17GM) IN 8 OZ OF LIQUID AND DRINK, patient uses this as needed   Refills:  12        ranitidine 150 MG tablet   Commonly known as:  ZANTAC   Dose:  150 mg   Quantity:  60 tablet        Take 1 tablet (150 mg) by mouth 2 times daily (Needs follow-up appointment for this medication)   Refills:  0        VITAMIN D PO   Dose:  5000 Units        Take 5,000 Units by mouth daily.   Refills:  0                Prescriptions were sent or printed at these locations (2 Prescriptions)                   [facility] [address]   [address] [facility] [address]    Telephone:  [phone]   Fax:  [phone]   Hours:                  E-Prescribed (2 of 2)         fluticasone (FLONASE) 50 MCG/ACT spray               predniSONE (DELTASONE) 20 MG tablet                Orders Needing Specimen Collection     None      Pending Results     No orders found from 9/8/2017 to 9/11/2017.            Pending Culture Results     No orders found from 9/8/2017 to 9/11/2017.            Pending Results Instructions     If you had any lab results that were not finalized at the time of your Discharge, you can call the ED Lab Result RN at [phone]. You will be contacted by this team for any positive Lab results or changes in treatment. The nurses are  "available 7 days a week from 10A to 6:30P.  You can leave a message 24 hours per day and they will return your call.        Test Results From Your Hospital Stay               Thank you for choosing [facility]       Thank you for choosing [facility] for your care. Our goal is always to provide you with excellent care. Hearing back from our patients is one way we can continue to improve our services. Please take a few minutes to complete the written survey that you may receive in the mail after you visit with us. Thank you!        [employer] Information     [employer] lets you send messages to your doctor, view your test results, renew your prescriptions, schedule appointments and more. To sign up, go to [email or web address] . Click on \"Log in\" on the left side of the screen, which will take you to the Welcome page. Then click on \"Sign up Now\" on the right side of the page.     You will be asked to enter the access code listed below, as well as some personal information. Please follow the directions to create your username and password.     Your access code is: [email or web address]  Expires: 10/18/2017 10:15 AM     Your access code will  in 90 days. If you need help or a new code, please call your [facility] clinic or [phone].        Care EveryWhere ID     This is your Care EveryWhere ID. This could be used by other organizations to access your [facility] medical records  [id number]        Equal Access to Services     GRUPO ANDERSON : Hadii annette Ty, wajaquanda rolly, qaybta kaalmanahomi blanco, ronald browne . So [facility] [phone].    ATENCIÓN: Si habla español, tiene a shipman disposición servicios gratuitos de asistencia lingüística. Llame al [phone].    We comply with applicable federal civil rights laws and [place] laws. We do not discriminate on the basis of race, color, national origin, age, disability sex, sexual orientation or gender identity.            After Visit Summary  "      This is your record. Keep this with you and show to your community pharmacist(s) and doctor(s) at your next visit.

## 2018-06-17 ENCOUNTER — HOSPITAL ENCOUNTER (EMERGENCY)
Facility: CLINIC | Age: 63
Discharge: HOME | End: 2018-06-17
Attending: FAMILY MEDICINE | Admitting: FAMILY MEDICINE
Payer: MEDICARE

## 2018-06-17 ENCOUNTER — APPOINTMENT (OUTPATIENT)
Dept: GENERAL RADIOLOGY | Facility: CLINIC | Age: 63
End: 2018-06-17
Attending: FAMILY MEDICINE
Payer: MEDICARE

## 2018-06-17 VITALS
BODY MASS INDEX: 26.36 KG/M2 | WEIGHT: 178 LBS | HEIGHT: 69 IN | DIASTOLIC BLOOD PRESSURE: 79 MMHG | TEMPERATURE: 99.9 F | SYSTOLIC BLOOD PRESSURE: 123 MMHG | OXYGEN SATURATION: 96 % | RESPIRATION RATE: 18 BRPM

## 2018-06-17 DIAGNOSIS — B97.89 VIRAL RESPIRATORY INFECTION: ICD-10-CM

## 2018-06-17 DIAGNOSIS — J98.8 VIRAL RESPIRATORY INFECTION: ICD-10-CM

## 2018-06-17 PROCEDURE — 71046 X-RAY EXAM CHEST 2 VIEWS: CPT

## 2018-06-17 PROCEDURE — A9270 NON-COVERED ITEM OR SERVICE: HCPCS | Mod: GY | Performed by: FAMILY MEDICINE

## 2018-06-17 PROCEDURE — 99283 EMERGENCY DEPT VISIT LOW MDM: CPT | Mod: Z6 | Performed by: FAMILY MEDICINE

## 2018-06-17 PROCEDURE — 99284 EMERGENCY DEPT VISIT MOD MDM: CPT | Mod: 25 | Performed by: FAMILY MEDICINE

## 2018-06-17 PROCEDURE — 25000132 ZZH RX MED GY IP 250 OP 250 PS 637: Mod: GY | Performed by: FAMILY MEDICINE

## 2018-06-17 PROCEDURE — 87633 RESP VIRUS 12-25 TARGETS: CPT | Performed by: FAMILY MEDICINE

## 2018-06-17 RX ADMIN — IBUPROFEN 600 MG: 400 TABLET ORAL at 11:19

## 2018-06-17 NOTE — DISCHARGE INSTRUCTIONS
You may use acetaminophen 650-1000 mg 4 times per day if needed for pain or fever.  You may add ibuprofen 400-600 mg 4 times per day if needed.  Return to be seen if you have fevers greater than 3 days, difficulty breathing, chest pain, or other new concerning symptoms.

## 2018-06-17 NOTE — ED AVS SNAPSHOT
[facility] Emergency Department    [address] [address]    Phone:  [phone]    Fax:  [phone]                                       nIa Otoole   MRN: [id number]    Department:  [facility] Emergency Department   Date of Visit:  6/17/2018           Patient Information     Date Of Birth          1955        Your diagnoses for this visit were:     Viral respiratory infection        You were seen by Jaylon Christensen MD.        Discharge Instructions       You may use acetaminophen 650-1000 mg 4 times per day if needed for pain or fever.  You may add ibuprofen 400-600 mg 4 times per day if needed.  Return to be seen if you have fevers greater than 3 days, difficulty breathing, chest pain, or other new concerning symptoms.    24 Hour Appointment Hotline       To make an appointment at any [facility] clinic, call [phone] ([phone]). If you don't have a family doctor or clinic, we will help you find one. [facility] clinics are conveniently located to serve the needs of you and your family.             Review of your medicines      Our records show that you are taking the medicines listed below. If these are incorrect, please call your family doctor or clinic.        Dose / Directions Last dose taken    ADVIL PO   Dose:  400 mg        Take 400 mg by mouth 2 times daily   Refills:  0        ASPIRIN PO   Dose:  81 mg        Take 81 mg by mouth daily   Refills:  0        ranitidine 150 MG tablet   Commonly known as:  ZANTAC   Dose:  150 mg   Quantity:  60 tablet        Take 1 tablet (150 mg) by mouth 2 times daily (Needs follow-up appointment for this medication)   Refills:  0                Procedures and tests performed during your visit     Peripheral IV catheter    Respiratory Virus Panel by PCR    XR Chest 2 Views      Orders Needing Specimen Collection     None      Pending Results     No orders found from 6/15/2018 to 6/18/2018.            Pending Culture Results   "   No orders found from 6/15/2018 to 2018.            Pending Results Instructions     If you had any lab results that were not finalized at the time of your Discharge, you can call the ED Lab Result RN at [phone]. You will be contacted by this team for any positive Lab results or changes in treatment. The nurses are available 7 days a week from 10A to 6:30P.  You can leave a message 24 hours per day and they will return your call.        Test Results From Your Hospital Stay        2018 11:37 AM      Narrative     XR CHEST 2 VW 2018 11:28 AM     HISTORY: fever, cough;         Impression     IMPRESSION: Negative exam.    NAVI RIOS MD                Thank you for choosing [facility]       Thank you for choosing [facility] for your care. Our goal is always to provide you with excellent care. Hearing back from our patients is one way we can continue to improve our services. Please take a few minutes to complete the written survey that you may receive in the mail after you visit with us. Thank you!        [employer] Information     [employer] lets you send messages to your doctor, view your test results, renew your prescriptions, schedule appointments and more. To sign up, go to [email or web address] . Click on \"Log in\" on the left side of the screen, which will take you to the Welcome page. Then click on \"Sign up Now\" on the right side of the page.     You will be asked to enter the access code listed below, as well as some personal information. Please follow the directions to create your username and password.     Your access code is: [email or web address]  Expires: 9/15/2018 11:52 AM     Your access code will  in 90 days. If you need help or a new code, please call your [facility] clinic or [phone].        Care EveryWhere ID     This is your Care EveryWhere ID. This could be used by other organizations to access your [facility] medical records  [id number]        Equal Access to Services     GRUPO ANDERSON " AH: Jose Angel Ty, waguillermo lurosalieadaha, qachilota karonald hernandez. So [facility] [phone].    ATENCIÓN: Si habla español, tiene a shipman disposición servicios gratuitos de asistencia lingüística. Llame al [phone].    We comply with applicable federal civil rights laws and [place] laws. We do not discriminate on the basis of race, color, national origin, age, disability, sex, sexual orientation, or gender identity.            After Visit Summary       This is your record. Keep this with you and show to your community pharmacist(s) and doctor(s) at your next visit.

## 2018-06-17 NOTE — ED AVS SNAPSHOT
[facility] Emergency Department    [address] [address]    Phone:  [phone]    Fax:  [phone]                                       Ina Otoole   MRN: [id number]    Department:  [facility] Emergency Department   Date of Visit:  6/17/2018           After Visit Summary Signature Page     I have received my discharge instructions, and my questions have been answered. I have discussed any challenges I see with this plan with the nurse or doctor.    ..........................................................................................................................................  Patient/Patient Representative Signature      ..........................................................................................................................................  Patient Representative Print Name and Relationship to Patient    ..................................................               ................................................  Date                                            Time    ..........................................................................................................................................  Reviewed by Signature/Title    ...................................................              ..............................................  Date                                                            Time

## 2018-06-17 NOTE — ED NOTES
"Patient reports yesterday she was driving home from her daughter's and felt like her \"brain was foggy\".  Patient states that she started having body aches and fever of 101 since last night.  Patient states she has been at her daughter's and may have had a tick bite.  "

## 2018-06-17 NOTE — ED PROVIDER NOTES
History     Chief Complaint   Patient presents with     Cough     fever.body aches started yesterday     HPI     Ina Otoole is a 63 year old female who presents to  ED for evaluation of a cough. Patient reports that her symptoms started yesterday afternoon while she was driving back from her daughters. At that time, she reports trouble thinking, myalgias, chills, and fever. Today, patient started to have a cough as well. Her fever was 101 this morning. She reports shortness of breath as it is hard for her to catch her breath, urinary frequency, and a sore throat. She has not taken any medications for her symptoms. She takes ranitidine daily.     Patient denies nausea, vomiting, abdominal pain, diarrhea, chest pain, and dysuria. She is not a smoker. She notes that she did gardening while she was with her daughter, and she already checked herself for ticks.     Problem List:    Patient Active Problem List    Diagnosis Date Noted     Renal calculus or stone 07/28/2011     Priority: High     Left, with mild hydronephrosis           Preop general physical exam 11/05/2013     Priority: Medium     Advanced directives, counseling/discussion 08/28/2013     Priority: Medium     Advance Care Planning:   Receipt of ACP document:  Received: Health Care Directive which was witnessed or notarized on 8/22/13.  Document not previously scanned.  Validation form completed and sent with document to be scanned.  Code Status reflects choices in most recent ACP document.  Confirmed/documented designated decision maker(s). See permanent comments section of demographics in clinical tab. View document(s) and details by clicking on code status.   Added by Luz Marina Jennings on 9/3/2013.           IBS (irritable bowel syndrome) 04/24/2012     Priority: Medium     With chronic constipation       Calculus of gallbladder with other cholecystitis, without mention of obstruction 06/07/2011     Priority: Medium     CARDIOVASCULAR SCREENING; LDL  GOAL LESS THAN 160 10/31/2010     Priority: Medium     OA (osteoarthritis) of knee 11/26/2008     Priority: Medium     Pain in joint, lower leg 12/19/2007     Priority: Medium     Myalgia and myositis      Priority: Medium     Dx in 1995 based on pain sx.   Went to [facility] [place], had botox injections into trigger points, went to pain clinic, PT.  Never had any improvement.    Problem list name updated by automated process. Provider to review       Goiter      Priority: Medium     Had aspiration, was on replacement for about a year, hasn't needed since.    Problem list name updated by automated process. Provider to review       Esophageal reflux 02/07/2007     Priority: Medium     Was given script for PPI, but couldn't afford, has been using tums and zantac          Past Medical History:    Past Medical History:   Diagnosis Date     Esophageal reflux      Goiter, unspecified      Myalgia and myositis, unspecified        Past Surgical History:    Past Surgical History:   Procedure Laterality Date     ARTHROSCOPY KNEE RT/LT      left - meniscus repair     COLONOSCOPY N/A 8/24/2017    Procedure: COLONOSCOPY;  Colonoscopy  ;  Surgeon: Abhishek Escobedo MD;  Location: [facility]     KNEE SURGERY  9/2013    left knee replacement     LAPAROSCOPIC CHOLECYSTECTOMY  6/29/2011    Procedure:LAPAROSCOPIC CHOLECYSTECTOMY; Surgeon:SUMAYA ZAMORANO; Location:[facility]     STONE ANALYSIS  2011     THYROID BIOPSY         Family History:    Family History   Problem Relation Age of Onset     DIABETES Mother      Hypertension Mother      CEREBROVASCULAR DISEASE Mother      CANCER Mother      melanoma     Respiratory Father      copd     C.A.D. Father      Cancer - colorectal Father      Breast Cancer Maternal Grandmother      DIABETES Paternal Grandmother      Hypertension Brother      Hypertension Sister      DIABETES Sister      Neurologic Disorder Sister      ms     Asthma No family hx of      Prostate Cancer No family hx  "of        Social History:  Marital Status:   [5]  Social History   Substance Use Topics     Smoking status: Former Smoker     Quit date: 2/22/2000     Smokeless tobacco: Never Used     Alcohol use Yes      Comment: only for special occassions        Medications:      ASPIRIN PO   Ibuprofen (ADVIL PO)   ranitidine (ZANTAC) 150 MG tablet         Review of Systems  Further problem focused system review negative.    Physical Exam   BP: 130/64  Heart Rate: 98  Temp: 99  F (37.2  C)  Resp: 18  Height: 175.3 cm (5' 9\")  Weight: 80.7 kg (178 lb)  SpO2: 96 %      Physical Exam    Nursing note and vitals were reviewed.  Constitutional: Awake and alert, adequately nourished and developed appearing 63-year-old in no apparent discomfort, who appears moderately ill, and who answers questions appropriately and cooperates with examination.  HEENT: EACs clear.  TMs normal.  Oropharynx is erythema of the anterior pillars and is otherwise unremarkable.  PERRLA.  EOMI.   Neck: Freely mobile.  Cardiovascular: Cardiac examination reveals normal heart rate and regular rhythm without murmur.  Pulmonary/Chest: Breathing is unlabored.  Breath sounds are clear and equal bilaterally.  There no retractions, tachypnea, rales, wheezes, or rhonchi.  Neurological: Alert, oriented, thought content logical, coherent   Skin: Warm, dry, no rashes.  Psychiatric: Affect broad and appropriate.    ED Course     ED Course     Procedures               Critical Care time:  none               Results for orders placed or performed during the hospital encounter of 06/17/18 (from the past 24 hour(s))   XR Chest 2 Views    Narrative    XR CHEST 2 VW 6/17/2018 11:28 AM     HISTORY: fever, cough;       Impression    IMPRESSION: Negative exam.    NAVI RIOS MD       Medications   ibuprofen (ADVIL/MOTRIN) tablet 600 mg (600 mg Oral Given 6/17/18 1119)       11:00 AM Patient assessed. Course of care outlined.     Assessments & Plan (with Medical Decision " Making)     63-year-old female presents with fever, cough, sore throat, nasal congestion which began yesterday.  Chest x-ray shows no evidence of pneumonia.  Symptoms are consistent with viral respiratory infection.  She is concerned about being around her 90-year-old mother and I advised caution and wearing a mask and good handwashing to try to avoid transmitting the infection to her mother.  I discussed with her that regardless of the cause she is currently contagious.  We are going to do a viral respiratory panel to see if she might have influenza.  She has influenza type symptoms.  We will hold off on initiating therapy.  She is to return if she has fevers greater than 3 days, develops shortness of breath, chest pain, or other new worrisome symptoms.    I have reviewed the nursing notes.    I have reviewed the findings, diagnosis, plan and need for follow up with the patient.       New Prescriptions    No medications on file       Final diagnoses:   Viral respiratory infection     This document serves as a record of the services and decisions personally performed and made by Jaylon Christensen MD. It was created on HIS/HER behalf by   Camryn Rivera, a trained medical scribe. The creation of this document is based the provider's statements to the medical scribe.  Camryn Rivera 11:00 AM 6/17/2018    Provider:   The information in this document, created by the medical scribe for me, accurately reflects the services I personally performed and the decisions made by me. I have reviewed and approved this document for accuracy prior to leaving the patient care area.  Jaylon Christensen MD 11:00 AM 6/17/2018 6/17/2018   [facility] EMERGENCY DEPARTMENT     Jaylon Christensen MD  06/17/18 4935

## 2018-06-18 LAB
FLUAV H1 2009 PAND RNA SPEC QL NAA+PROBE: NEGATIVE
FLUAV H1 RNA SPEC QL NAA+PROBE: NEGATIVE
FLUAV H3 RNA SPEC QL NAA+PROBE: NEGATIVE
FLUAV RNA SPEC QL NAA+PROBE: NEGATIVE
FLUBV RNA SPEC QL NAA+PROBE: NEGATIVE
HADV DNA SPEC QL NAA+PROBE: NEGATIVE
HADV DNA SPEC QL NAA+PROBE: NEGATIVE
HMPV RNA SPEC QL NAA+PROBE: NEGATIVE
HPIV1 RNA SPEC QL NAA+PROBE: NEGATIVE
HPIV2 RNA SPEC QL NAA+PROBE: NEGATIVE
HPIV3 RNA SPEC QL NAA+PROBE: NEGATIVE
MICROBIOLOGIST REVIEW: NORMAL
RHINOVIRUS RNA SPEC QL NAA+PROBE: NEGATIVE
RSV RNA SPEC QL NAA+PROBE: NEGATIVE
RSV RNA SPEC QL NAA+PROBE: NEGATIVE
SPECIMEN SOURCE: NORMAL

## 2018-07-10 ENCOUNTER — TRANSFERRED RECORDS (OUTPATIENT)
Dept: HEALTH INFORMATION MANAGEMENT | Facility: CLINIC | Age: 63
End: 2018-07-10

## 2018-07-11 ENCOUNTER — TELEPHONE (OUTPATIENT)
Dept: FAMILY MEDICINE | Facility: CLINIC | Age: 63
End: 2018-07-11

## 2018-07-11 DIAGNOSIS — N64.59 INVERTED NIPPLE: Primary | ICD-10-CM

## 2018-07-11 NOTE — TELEPHONE ENCOUNTER
"Reason for Call: Request for an order or referral:    Order or referral being requested: Pt went in to have a mammogram yesterday and because she is having \"issues\" they told her to call Dr. Ashby to have her place an order for a diagnostic mammogram and then she can call back to make an appt.  Please call patient and advise.        Date needed: at your convenience    Has the patient been seen by the PCP for this problem? NO    Additional comments:     Phone number Patient can be reached at:  Cell number on file:    Telephone Information:   Mobile [phone]       Best Time:  any    Can we leave a detailed message on this number?  YES    Call taken on 7/11/2018 at 10:48 AM by Megan Sarabia    "

## 2018-07-11 NOTE — TELEPHONE ENCOUNTER
Dr. Ashby,    Patient states she has experienced a right sided inverted nipple, right side intermittent pain under armpit. For 3 months. Grandmother and cousin had breast cancer.    Needing to change to diagnostic per Rad Dept. Needing Breast US on affected side too-I am having an issue pending the order on the US.     Carri APPIAH RN

## 2018-07-12 NOTE — TELEPHONE ENCOUNTER
Called patient and informed her that new orders had been placed and she should now be able to call and get appointment for the mammogram.   Andreina Allen CMA ([facility])

## 2018-07-23 ENCOUNTER — HOSPITAL ENCOUNTER (OUTPATIENT)
Dept: MAMMOGRAPHY | Facility: CLINIC | Age: 63
Discharge: HOME | End: 2018-07-23
Attending: FAMILY MEDICINE | Admitting: FAMILY MEDICINE
Payer: MEDICARE

## 2018-07-23 DIAGNOSIS — N64.59 INVERTED NIPPLE: ICD-10-CM

## 2018-07-23 PROCEDURE — G0279 TOMOSYNTHESIS, MAMMO: HCPCS

## 2018-08-02 ENCOUNTER — OFFICE VISIT (OUTPATIENT)
Dept: FAMILY MEDICINE | Facility: CLINIC | Age: 63
End: 2018-08-02
Payer: MEDICARE

## 2018-08-02 VITALS
HEART RATE: 68 BPM | BODY MASS INDEX: 27.22 KG/M2 | SYSTOLIC BLOOD PRESSURE: 100 MMHG | WEIGHT: 183.8 LBS | DIASTOLIC BLOOD PRESSURE: 70 MMHG | TEMPERATURE: 97.7 F | HEIGHT: 69 IN

## 2018-08-02 DIAGNOSIS — M17.11 PRIMARY OSTEOARTHRITIS OF RIGHT KNEE: ICD-10-CM

## 2018-08-02 DIAGNOSIS — Z23 NEED FOR VACCINATION: ICD-10-CM

## 2018-08-02 DIAGNOSIS — I80.3 THROMBOPHLEBITIS LEG: ICD-10-CM

## 2018-08-02 DIAGNOSIS — Z01.818 PREOP GENERAL PHYSICAL EXAM: Primary | ICD-10-CM

## 2018-08-02 DIAGNOSIS — R82.90 NONSPECIFIC FINDING ON EXAMINATION OF URINE: ICD-10-CM

## 2018-08-02 DIAGNOSIS — N39.0 URINARY TRACT INFECTION WITHOUT HEMATURIA, SITE UNSPECIFIED: ICD-10-CM

## 2018-08-02 LAB
ALBUMIN UR-MCNC: NEGATIVE MG/DL
APPEARANCE UR: CLEAR
BACTERIA #/AREA URNS HPF: ABNORMAL /HPF
BASOPHILS # BLD AUTO: 0.1 10E9/L (ref 0–0.2)
BASOPHILS NFR BLD AUTO: 0.9 %
BILIRUB UR QL STRIP: NEGATIVE
COLOR UR AUTO: YELLOW
DIFFERENTIAL METHOD BLD: NORMAL
EOSINOPHIL # BLD AUTO: 0.2 10E9/L (ref 0–0.7)
EOSINOPHIL NFR BLD AUTO: 3.1 %
ERYTHROCYTE [DISTWIDTH] IN BLOOD BY AUTOMATED COUNT: 12.2 % (ref 10–15)
GLUCOSE UR STRIP-MCNC: NEGATIVE MG/DL
HCT VFR BLD AUTO: 42 % (ref 35–47)
HGB BLD-MCNC: 14.1 G/DL (ref 11.7–15.7)
HGB UR QL STRIP: NEGATIVE
KETONES UR STRIP-MCNC: NEGATIVE MG/DL
LEUKOCYTE ESTERASE UR QL STRIP: ABNORMAL
LYMPHOCYTES # BLD AUTO: 2.5 10E9/L (ref 0.8–5.3)
LYMPHOCYTES NFR BLD AUTO: 38.9 %
MCH RBC QN AUTO: 30.8 PG (ref 26.5–33)
MCHC RBC AUTO-ENTMCNC: 33.6 G/DL (ref 31.5–36.5)
MCV RBC AUTO: 92 FL (ref 78–100)
MONOCYTES # BLD AUTO: 0.5 10E9/L (ref 0–1.3)
MONOCYTES NFR BLD AUTO: 8.5 %
MRSA DNA SPEC QL NAA+PROBE: NEGATIVE
NEUTROPHILS # BLD AUTO: 3.1 10E9/L (ref 1.6–8.3)
NEUTROPHILS NFR BLD AUTO: 48.6 %
NITRATE UR QL: NEGATIVE
NON-SQ EPI CELLS #/AREA URNS LPF: ABNORMAL /LPF
PH UR STRIP: 5.5 PH (ref 5–7)
PLATELET # BLD AUTO: 182 10E9/L (ref 150–450)
RBC # BLD AUTO: 4.58 10E12/L (ref 3.8–5.2)
RBC #/AREA URNS AUTO: ABNORMAL /HPF
SOURCE: ABNORMAL
SP GR UR STRIP: >1.03 (ref 1–1.03)
SPECIMEN SOURCE: NORMAL
UROBILINOGEN UR STRIP-ACNC: 0.2 EU/DL (ref 0.2–1)
WBC # BLD AUTO: 6.4 10E9/L (ref 4–11)
WBC #/AREA URNS AUTO: ABNORMAL /HPF

## 2018-08-02 PROCEDURE — G0009 ADMIN PNEUMOCOCCAL VACCINE: HCPCS | Performed by: FAMILY MEDICINE

## 2018-08-02 PROCEDURE — 90670 PCV13 VACCINE IM: CPT | Performed by: FAMILY MEDICINE

## 2018-08-02 PROCEDURE — 87640 STAPH A DNA AMP PROBE: CPT | Performed by: FAMILY MEDICINE

## 2018-08-02 PROCEDURE — 36415 COLL VENOUS BLD VENIPUNCTURE: CPT | Performed by: FAMILY MEDICINE

## 2018-08-02 PROCEDURE — 85025 COMPLETE CBC W/AUTO DIFF WBC: CPT | Performed by: FAMILY MEDICINE

## 2018-08-02 PROCEDURE — 81001 URINALYSIS AUTO W/SCOPE: CPT | Performed by: FAMILY MEDICINE

## 2018-08-02 PROCEDURE — 93000 ELECTROCARDIOGRAM COMPLETE: CPT | Performed by: FAMILY MEDICINE

## 2018-08-02 PROCEDURE — 99214 OFFICE O/P EST MOD 30 MIN: CPT | Mod: 25 | Performed by: FAMILY MEDICINE

## 2018-08-02 PROCEDURE — 87086 URINE CULTURE/COLONY COUNT: CPT | Performed by: FAMILY MEDICINE

## 2018-08-02 PROCEDURE — 87641 MR-STAPH DNA AMP PROBE: CPT | Performed by: FAMILY MEDICINE

## 2018-08-02 RX ORDER — CIPROFLOXACIN 500 MG/1
500 TABLET, FILM COATED ORAL 2 TIMES DAILY
Qty: 28 TABLET | Refills: 0 | Status: SHIPPED | OUTPATIENT
Start: 2018-08-02 | End: 2018-08-24

## 2018-08-02 NOTE — MR AVS SNAPSHOT
After Visit Summary   8/2/2018    Ina Otoole    MRN: [id number]           Patient Information     Date Of Birth          1955        Visit Information        Provider Department      8/2/2018 10:40 AM Verito Ashby MD [facility]        Today's Diagnoses     Preop general physical exam    -  1    Primary osteoarthritis of right knee          Care Instructions      Before Your Surgery      Call your surgeon if there is any change in your health. This includes signs of a cold or flu (such as a sore throat, runny nose, cough, rash or fever).    Do not smoke, drink alcohol or take over the counter medicine (unless your surgeon or primary care doctor tells you to) for the 24 hours before and after surgery.    If you take prescribed drugs: Follow your doctor s orders about which medicines to take and which to stop until after surgery.    Eating and drinking prior to surgery: follow the instructions from your surgeon    Take a shower or bath the night before surgery. Use the soap your surgeon gave you to gently clean your skin. If you do not have soap from your surgeon, use your regular soap. Do not shave or scrub the surgery site.  Wear clean pajamas and have clean sheets on your bed.         Thank you for choosing [facility].  You may be receiving a survey in the mail from [employer] regarding your visit today.  Please take a few minutes to complete and return the survey to let us know how we are doing.      If you have questions or concerns, please contact us via [email or web address] or you can contact your care team at [phone].    Our Clinic hours are:  Monday 6:40 am  to 7:00 pm  Tuesday -Friday 6:40 am to 5:00 pm    The [place] outpatient lab hours are:  Monday - Friday 6:10 am to 4:45 pm  Saturdays 7:00 am to 11:00 am  Appointments are required, call [phone]      If you have clinical questions after hours or would like to schedule an appointment,  call the clinic  at [phone].    Presurgery Checklist  You are scheduled to have surgery. The healthcare staff will try to make your stay comfortable. Use the guidelines below to remind yourself what to do before surgery. Be sure to follow any specific pre-op instructions from your surgeon or nurse.  Preparing for Surgery  Ask your surgeon if you ll need a blood transfusion during surgery and if so, how to prepare for it. In some cases, you can donate blood before surgery. If needed, this blood can be given back (transfused) to you during or after surgery.  If you are having abdominal surgery, ask what you need to do to clear your bowel.  Tell your surgeon if you have allergies to any medications or foods.  Arrange for an adult family member or friend to drive you home after surgery. If possible, have someone ready to help you at home as you recover.  Call the surgeon if you get a cold, fever, sore throat, diarrhea, or other health problem just before surgery. Your surgeon can decide whether or not to postpone the surgery.  Medications  Tell your surgeon about all medications you take, including prescription and over-the-counter products such as herbal remedies and vitamins. Ask if you should continue taking them.  If you take ibuprofen, naproxen, or  blood thinners  such as aspirin, clopidogrel (Plavix), or warfarin (Coumadin), ask your surgeon whether you should stop taking them and how long before surgery you should stop.  You may be told to take antibiotics just before surgery to prevent infection. If so, follow instructions carefully on how to take them.  If you are told to take medications called anticoagulants to prevent blood clots after surgery, be sure to follow the instructions on how to take them.  Stop Smoking  If you smoke, healing may take longer. So at least 2 week(s) before surgery, stop smoking.  Bathing or Showering Before Surgery  If instructed, wash with antibacterial soap. Afterward, do not use lotions or  powders.  If you are having surgery on the head, you may be asked to shampoo with antibacterial soap. Follow instructions for doing so.  Do Not Remove Hair from the Surgery Site  Do not shave hair from the incision site, unless you are given specific instructions to do so. Usually, if hair needs to be removed, it will be done at the hospital right before surgery.  Don t Eat or Drink  Your doctor will tell you when to stop eating and drinking. If you do not follow your doctor's instructions, your procedure may be postponed or rescheduled for another day.  If your surgeon tells you to continue any medications, take them with small sips of water.  You can brush your teeth and rinse your mouth, but don t swallow any water.  Day of Surgery  Do not wear makeup. Do not use perfume, deodorant, or hairspray. Remove nail polish and artificial nails.  Leave jewelry (including rings), watches, and other valuables at home.  Be sure to bring health insurance cards or forms and a photo ID.  Bring a list of your medications (include the name, dose, how often you take them, and the time last dose was taken).  Arrive on time at the hospital or surgery facility.    [id number] [employer]. [address] [place] [address], [place] [address]. All rights reserved. This information is not intended as a substitute for professional medical care. Always follow your healthcare professional's instructions.  This information has been modified by your health care provider with permission from the publisher.              Follow-ups after your visit        Who to contact     If you have questions or need follow up information about today's clinic visit or your schedule please contact [facility] directly at [phone].  Normal or non-critical lab and imaging results will be communicated to you by MyChart, letter or phone within 4 business days after the clinic has received the results. If you do not hear from us within 7 days,  "please contact the clinic through [employer] or phone. If you have a critical or abnormal lab result, we will notify you by phone as soon as possible.  Submit refill requests through [employer] [facility] or call your pharmacy and they will forward the refill request to us. Please allow 3 business days for your refill to be completed.          Additional Information About Your Visit        Care EveryWhere ID     This is your Care EveryWhere ID. This could be used by other organizations to access your [facility] medical records  [id number]        Your Vitals Were     Pulse Temperature Height Breastfeeding? BMI (Body Mass Index)       68 97.7  F (36.5  C) (Tympanic) 5' 9\" (1.753 m) No 27.14 kg/m2        Blood Pressure from Last 3 Encounters:   08/02/18 100/70   06/17/18 123/79   09/10/17 (!) 132/92    Weight from Last 3 Encounters:   08/02/18 183 lb 12.8 oz (83.4 kg)   06/17/18 178 lb (80.7 kg)   09/10/17 188 lb (85.3 kg)              We Performed the Following     CBC with platelets differential     EKG 12-lead complete w/read - Clinics        Primary Care Provider Office Phone # Fax #    Verito Ashby [phone] [address] [facility] [address]        Equal Access to Services     GRUPO ANDERSON : Hadii aad ku hadasho Soomaali, waaxda luqadaha, qaybta kaalmada adeegyada, waxay coreen hayshahid browne . So [facility] [phone].    ATENCIÓN: Si habla español, tiene a shipman disposición servicios gratuitos de asistencia lingüística. Llame al [phone].    We comply with applicable federal civil rights laws and [place] laws. We do not discriminate on the basis of race, color, national origin, age, disability, sex, sexual orientation, or gender identity.            Thank you!     Thank you for choosing [facility]  for your care. Our goal is always to provide you with excellent care. Hearing back from our patients is one way we can continue to improve our services. Please take a " few minutes to complete the written survey that you may receive in the mail after your visit with us. Thank you!             Your Updated Medication List - Protect others around you: Learn how to safely use, store and throw away your medicines at www.disposemymeds.org.          This list is accurate as of 8/2/18 11:41 AM.  Always use your most recent med list.                   Brand Name Dispense Instructions for use Diagnosis    ADVIL PO      Take 400 mg by mouth 2 times daily        ASPIRIN PO      Take 81 mg by mouth daily        ranitidine 150 MG tablet    ZANTAC    60 tablet    Take 1 tablet (150 mg) by mouth 2 times daily (Needs follow-up appointment for this medication)    Esophageal reflux

## 2018-08-02 NOTE — PROGRESS NOTES
"  [facility]  [address] [address]  [phone]  Dept: [phone]    PRE-OP EVALUATION:  Today's date: 2018    Ina Otoole (: 1955) presents for pre-operative evaluation assessment as requested by Dr. Haji.  She requires evaluation and anesthesia risk assessment prior to undergoing surgery/procedure for treatment of Right Knee  .    Proposed Surgery/ Procedure: Right Knee Replacement   Date of Surgery/ Procedure: 18  Time of Surgery/ Procedure:1230   Hospital/Surgical Facility: [facility]   Fax number for surgical facility: [phone]  Primary Physician: Verito Ashby  Type of Anesthesia Anticipated: General    Patient has a Health Care Directive or Living Will:  YES will bring to surgery     1. YES Saphenous Vein Ablation Right Leg  - Do you have a history of heart attack, stroke, stent, bypass or surgery on an artery in the head, neck, heart or legs?  2. NO - Do you ever have any pain or discomfort in your chest?  3. NO - Do you have a history of  Heart Failure?  4. NO - Are you troubled by shortness of breath when: walking on the level, up a slight hill or at night?  5. NO - Do you currently have a cold, bronchitis or other respiratory infection?  6. NO - Do you have a cough, shortness of breath or wheezing?  7. NO - Do you sometimes get pains in the calves of your legs when you walk?  8. YES Self-Right Leg, Mother - Lungs - Do you or anyone in your family have previous history of blood clots?  9. NO - Do you or does anyone in your family have a serious bleeding problem such as prolonged bleeding following surgeries or cuts?  10. NO - Have you ever had problems with anemia or been told to take iron pills?  11. NO - Have you had any abnormal blood loss such as black, tarry or bloody stools, or abnormal vaginal bleeding?  12. NO - Have you ever had a blood transfusion?  13. YES Mother-\"trouble awaking,delerium\" - Have you or any " of your relatives ever had problems with anesthesia?  14. NO - Do you have sleep apnea, excessive snoring or daytime drowsiness?  15. NO - Do you have any prosthetic heart valves?  16. YES Left Knee - Do you have prosthetic joints?  17. NO - Is there any chance that you may be pregnant?      HPI:     HPI related to upcoming procedure: Patient here for pre op. She is having a right knee replacement. She is relatively healthy and denies any acute symptoms. 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 She denies any acute chest pain. No history of any cardiac events .        See problem list for active medical problems.  Problems all longstanding and stable, except as noted/documented.  See ROS for pertinent symptoms related to these conditions.                                                                                                                                                          .    MEDICAL HISTORY:     Patient Active Problem List    Diagnosis Date Noted     Renal calculus or stone 07/28/2011     Priority: High     Left, with mild hydronephrosis           Thrombophlebitis leg (H) 08/02/2018     Priority: Medium     Preop general physical exam 11/05/2013     Priority: Medium     Advanced directives, counseling/discussion 08/28/2013     Priority: Medium     Advance Care Planning:   Receipt of ACP document:  Received: Health Care Directive which was witnessed or notarized on 8/22/13.  Document not previously scanned.  Validation form completed and sent with document to be scanned.  Code Status reflects choices in most recent ACP document.  Confirmed/documented designated decision maker(s). See permanent comments section of demographics in clinical tab. View document(s) and details by clicking on code status.   Added by Luz Marina Jennings on 9/3/2013.           IBS (irritable bowel syndrome) 04/24/2012     Priority: Medium     With chronic constipation       Calculus of gallbladder with  other cholecystitis, without mention of obstruction 06/07/2011     Priority: Medium     CARDIOVASCULAR SCREENING; LDL GOAL LESS THAN 160 10/31/2010     Priority: Medium     OA (osteoarthritis) of knee 11/26/2008     Priority: Medium     Pain in joint, lower leg 12/19/2007     Priority: Medium     Myalgia and myositis      Priority: Medium     Dx in 1995 based on pain sx.   [facility]ent to [facility] [place], had botox injections into trigger points, went to pain clinic, PT.  Never had any improvement.    Problem list name updated by automated process. Provider to review       Goiter      Priority: Medium     Had aspiration, was on replacement for about a year, hasn't needed since.    Problem list name updated by automated process. Provider to review       Esophageal reflux 02/07/2007     Priority: Medium     Was given script for PPI, but couldn't afford, has been using tums and zantac        Past Medical History:   Diagnosis Date     Esophageal reflux      Goiter, unspecified      Myalgia and myositis, unspecified      Past Surgical History:   Procedure Laterality Date     ARTHROSCOPY KNEE RT/LT      left - meniscus repair     COLONOSCOPY N/A 8/24/2017    Procedure: COLONOSCOPY;  Colonoscopy  ;  Surgeon: Abhishek Escobedo MD;  Location: [facility]     KNEE SURGERY  9/2013    left knee replacement     LAPAROSCOPIC CHOLECYSTECTOMY  6/29/2011    Procedure:LAPAROSCOPIC CHOLECYSTECTOMY; Surgeon:SUMAYA ZAMORANO; Location:[facility]     STONE ANALYSIS  2011     THYROID BIOPSY       Current Outpatient Prescriptions   Medication Sig Dispense Refill     ASPIRIN PO Take 81 mg by mouth daily       ciprofloxacin (CIPRO) 500 MG tablet Take 1 tablet (500 mg) by mouth 2 times daily 28 tablet 0     Ibuprofen (ADVIL PO) Take 400 mg by mouth 2 times daily        ranitidine (ZANTAC) 150 MG tablet Take 1 tablet (150 mg) by mouth 2 times daily (Needs follow-up appointment for this medication) 60 tablet 0     OTC products: None, except as  "noted above    Allergies   Allergen Reactions     Sulfa Drugs Hives      Latex Allergy: NO    Social History   Substance Use Topics     Smoking status: Former Smoker     Quit date: 2/22/2000     Smokeless tobacco: Never Used     Alcohol use Yes      Comment: only for special occassions     History   Drug Use No       REVIEW OF SYSTEMS:   CONSTITUTIONAL: NEGATIVE for fever, chills, change in weight  INTEGUMENTARY/SKIN: NEGATIVE for worrisome rashes, moles or lesions  EYES: NEGATIVE for vision changes or irritation  ENT/MOUTH: NEGATIVE for ear, mouth and throat problems  RESP: NEGATIVE for significant cough or SOB  CV: NEGATIVE for chest pain, palpitations or peripheral edema  GI: NEGATIVE for nausea, abdominal pain, heartburn, or change in bowel habits  : NEGATIVE for frequency, dysuria, or hematuria  MUSCULOSKELETAL:POSITIVE  for joint pain right knee  HEME: NEGATIVE for bleeding problems  PSYCHIATRIC: NEGATIVE for changes in mood or affect    EXAM:   /70 (BP Location: Left arm, Patient Position: Chair, Cuff Size: Adult Large)  Pulse 68  Temp 97.7  F (36.5  C) (Tympanic)  Ht 5' 9\" (1.753 m)  Wt 183 lb 12.8 oz (83.4 kg)  Breastfeeding? No  BMI 27.14 kg/m2    GENERAL APPEARANCE: healthy, alert and no distress     EYES: EOMI, PERRL     HENT: ear canals and TM's normal and nose and mouth without ulcers or lesions     NECK: no adenopathy, no asymmetry, masses, or scars and thyroid normal to palpation     RESP: lungs clear to auscultation - no rales, rhonchi or wheezes     CV: regular rates and rhythm, normal S1 S2, no S3 or S4 and no murmur, click or rub     ABDOMEN:  soft, nontender, no HSM or masses and bowel sounds normal     MS: extremities normal- no gross deformities noted, no evidence of inflammation in joints, FROM in all extremities.     SKIN: no suspicious lesions or rashes     PSYCH: mentation appears normal. and affect normal/bright     LYMPHATICS: No cervical adenopathy    DIAGNOSTICS:   EKG: " appears normal, NSR, sinus bradycardia, normal axis, normal intervals, no acute ST/T changes c/w ischemia, no LVH by voltage criteria, unchanged from previous tracings    Recent Labs   Lab Test  08/29/16   1405 09/05/13 09/03/13 08/27/13   1222   07/28/11   0558   HGB  14.4  10.3*   < >   --   13.0   < >   --    PLT  210   --    --    --   181   < >   --    INR   --   0.88   --    --    --    --    --    NA  140   --    --    --    --    --   141   POTASSIUM  3.8  3.9   --   3.8   --    --   4.3   CR  0.80   --    --   0.78   --    --   0.81    < > = values in this interval not displayed.      Results for orders placed or performed in visit on 08/02/18   CBC with platelets differential   Result Value Ref Range    WBC 6.4 4.0 - 11.0 10e9/L    RBC Count 4.58 3.8 - 5.2 10e12/L    Hemoglobin 14.1 11.7 - 15.7 g/dL    Hematocrit 42.0 35.0 - 47.0 %    MCV 92 78 - 100 fl    MCH 30.8 26.5 - 33.0 pg    MCHC 33.6 31.5 - 36.5 g/dL    RDW 12.2 10.0 - 15.0 %    Platelet Count 182 150 - 450 10e9/L    Diff Method Automated Method     % Neutrophils 48.6 %    % Lymphocytes 38.9 %    % Monocytes 8.5 %    % Eosinophils 3.1 %    % Basophils 0.9 %    Absolute Neutrophil 3.1 1.6 - 8.3 10e9/L    Absolute Lymphocytes 2.5 0.8 - 5.3 10e9/L    Absolute Monocytes 0.5 0.0 - 1.3 10e9/L    Absolute Eosinophils 0.2 0.0 - 0.7 10e9/L    Absolute Basophils 0.1 0.0 - 0.2 10e9/L   UA reflex to Microscopic and Culture   Result Value Ref Range    Color Urine Yellow     Appearance Urine Clear     Glucose Urine Negative NEG^Negative mg/dL    Bilirubin Urine Negative NEG^Negative    Ketones Urine Negative NEG^Negative mg/dL    Specific Gravity Urine >1.030 1.003 - 1.035    Blood Urine Negative NEG^Negative    pH Urine 5.5 5.0 - 7.0 pH    Protein Albumin Urine Negative NEG^Negative mg/dL    Urobilinogen Urine 0.2 0.2 - 1.0 EU/dL    Nitrite Urine Negative NEG^Negative    Leukocyte Esterase Urine Moderate (A) NEG^Negative    Source Midstream Urine    Urine  Microscopic   Result Value Ref Range    WBC Urine 10-25 (A) OTO5^0 - 5 /HPF    RBC Urine O - 2 OTO2^O - 2 /HPF    Squamous Epithelial /LPF Urine Moderate (A) FEW^Few /LPF    Bacteria Urine Few (A) NEG^Negative /HPF   Methicillin Resistant Staph Aureus PCR   Result Value Ref Range    Specimen Description Nares     Methicillin Resist/Sens S. aureus PCR Negative NEG^Negative   Urine Culture Aerobic Bacterial   Result Value Ref Range    Specimen Description Midstream Urine     Special Requests Specimen received in preservative     Culture Micro PENDING        IMPRESSION:   Reason for surgery/procedure: Right knee osteoarthritis  Diagnosis/reason for consult: Pre op evaluation     The proposed surgical procedure is considered LOW risk.    REVISED CARDIAC RISK INDEX  The patient has the following serious cardiovascular risks for perioperative complications such as (MI, PE, VFib and 3  AV Block):  No serious cardiac risks  INTERPRETATION: 0 risks: Class I (very low risk - 0.4% complication rate)    The patient has the following additional risks for perioperative complications:  No identified additional risks  The ASCVD Risk score (Lisaritchie HWANG Jr, et al., 2013) failed to calculate for the following reasons:    Cannot find a previous HDL lab    Cannot find a previous total cholesterol lab      ICD-10-CM    1. Preop general physical exam Z01.818 EKG 12-lead complete w/read - Clinics     CBC with platelets differential     UA reflex to Microscopic and Culture     Methicillin Resistant Staph Aureus PCR     Urine Microscopic   2. Primary osteoarthritis of right knee M17.11    3. Need for vaccination Z23 Pneumococcal vaccine 13 valent PCV13 IM (Prevnar) [15130]     1st  Administration  [46748]   4. Nonspecific finding on examination of urine R82.90 Urine Culture Aerobic Bacterial   5. Thrombophlebitis leg (H) I80.299    6. Urinary tract infection without hematuria, site unspecified N39.0 ciprofloxacin (CIPRO) 500 MG tablet        RECOMMENDATIONS:     --Consult hospital rounder / IM to assist post-op medical management    Cardiovascular Risk  Performs 4 METs exercise without symptoms (Light housework (dusting, washing dishes) and Climb a flight of stairs) .       Pulmonary Risk  Incentive spirometry post op  Respiratory Therapy (Respiratory Care IP Consult)  post op  NG tube decompression if abdominal distension or significant vomiting       --Patient is to take all scheduled medications on the day of surgery EXCEPT for modifications listed below.    Anticoagulant or Antiplatelet Medication Use  ASPIRIN: Discontinue ASA 7-10 days prior to procedure to reduce bleeding risk.  It should be resumed post-operatively.        APPROVAL GIVEN to proceed with proposed procedure, without further diagnostic evaluation       Signed Electronically by: Verito Ashby MD    Copy of this evaluation report is provided to requesting physician.    [place] Preop Guidelines    Revised Cardiac Risk Index

## 2018-08-02 NOTE — PATIENT INSTRUCTIONS
Before Your Surgery      Call your surgeon if there is any change in your health. This includes signs of a cold or flu (such as a sore throat, runny nose, cough, rash or fever).    Do not smoke, drink alcohol or take over the counter medicine (unless your surgeon or primary care doctor tells you to) for the 24 hours before and after surgery.    If you take prescribed drugs: Follow your doctor s orders about which medicines to take and which to stop until after surgery.    Eating and drinking prior to surgery: follow the instructions from your surgeon    Take a shower or bath the night before surgery. Use the soap your surgeon gave you to gently clean your skin. If you do not have soap from your surgeon, use your regular soap. Do not shave or scrub the surgery site.  Wear clean pajamas and have clean sheets on your bed.         Thank you for choosing [facility].  You may be receiving a survey in the mail from [facility] regarding your visit today.  Please take a few minutes to complete and return the survey to let us know how we are doing.      If you have questions or concerns, please contact us via [employer] or you can contact your care team at [phone].    Our Clinic hours are:  Monday 6:40 am  to 7:00 pm  Tuesday -Friday 6:40 am to 5:00 pm    The [place] outpatient lab hours are:  Monday - Friday 6:10 am to 4:45 pm  Saturdays 7:00 am to 11:00 am  Appointments are required, call [phone]      If you have clinical questions after hours or would like to schedule an appointment,  call the clinic at [phone].    Presurgery Checklist  You are scheduled to have surgery. The healthcare staff will try to make your stay comfortable. Use the guidelines below to remind yourself what to do before surgery. Be sure to follow any specific pre-op instructions from your surgeon or nurse.  Preparing for Surgery  Ask your surgeon if you ll need a blood transfusion during surgery and if so, how to prepare for it.  In some cases, you can donate blood before surgery. If needed, this blood can be given back (transfused) to you during or after surgery.  If you are having abdominal surgery, ask what you need to do to clear your bowel.  Tell your surgeon if you have allergies to any medications or foods.  Arrange for an adult family member or friend to drive you home after surgery. If possible, have someone ready to help you at home as you recover.  Call the surgeon if you get a cold, fever, sore throat, diarrhea, or other health problem just before surgery. Your surgeon can decide whether or not to postpone the surgery.  Medications  Tell your surgeon about all medications you take, including prescription and over-the-counter products such as herbal remedies and vitamins. Ask if you should continue taking them.  If you take ibuprofen, naproxen, or  blood thinners  such as aspirin, clopidogrel (Plavix), or warfarin (Coumadin), ask your surgeon whether you should stop taking them and how long before surgery you should stop.  You may be told to take antibiotics just before surgery to prevent infection. If so, follow instructions carefully on how to take them.  If you are told to take medications called anticoagulants to prevent blood clots after surgery, be sure to follow the instructions on how to take them.  Stop Smoking  If you smoke, healing may take longer. So at least 2 week(s) before surgery, stop smoking.  Bathing or Showering Before Surgery  If instructed, wash with antibacterial soap. Afterward, do not use lotions or powders.  If you are having surgery on the head, you may be asked to shampoo with antibacterial soap. Follow instructions for doing so.  Do Not Remove Hair from the Surgery Site  Do not shave hair from the incision site, unless you are given specific instructions to do so. Usually, if hair needs to be removed, it will be done at the hospital right before surgery.  Don t Eat or Drink  Your doctor will tell you  when to stop eating and drinking. If you do not follow your doctor's instructions, your procedure may be postponed or rescheduled for another day.  If your surgeon tells you to continue any medications, take them with small sips of water.  You can brush your teeth and rinse your mouth, but don t swallow any water.  Day of Surgery  Do not wear makeup. Do not use perfume, deodorant, or hairspray. Remove nail polish and artificial nails.  Leave jewelry (including rings), watches, and other valuables at home.  Be sure to bring health insurance cards or forms and a photo ID.  Bring a list of your medications (include the name, dose, how often you take them, and the time last dose was taken).  Arrive on time at the hospital or surgery facility.    4602-0825 [employer]. [address] [place] [address], [place] [address]. All rights reserved. This information is not intended as a substitute for professional medical care. Always follow your healthcare professional's instructions.  This information has been modified by your health care provider with permission from the publisher.

## 2018-08-03 ENCOUNTER — TELEPHONE (OUTPATIENT)
Dept: FAMILY MEDICINE | Facility: CLINIC | Age: 63
End: 2018-08-03

## 2018-08-03 LAB
BACTERIA SPEC CULT: NO GROWTH
Lab: NORMAL
SPECIMEN SOURCE: NORMAL

## 2018-08-03 NOTE — TELEPHONE ENCOUNTER
Reason for Call:  Other medication question    Detailed comments: pt calling wondering if it's ok for her to take cipro since she has bradycardia? She states she was told that at her visit. She read on the instructions/interactions that you shouldn't take it if you have a slow heart rate.    Phone Number Patient can be reached at: Home number on file [phone] (home)    Best Time: any    Can we leave a detailed message on this number? YES    Call taken on 8/3/2018 at 4:40 PM by Carol Harkins

## 2018-08-03 NOTE — TELEPHONE ENCOUNTER
Patient reports she was told she has a low HR at OV this week - OV notes state 68 and she states HR was 58 via EKG.  Was reading the cipro s/e insert (6 pages) and it states do not take if you have bradycardia.  RN does not see dx of bradycardia on snapshot.  Reviewed OV and PCP does not mention low pulse or bradycardia.  RN called and spoke with [facility] pharmacist, Riley, to review guidelines of this medication.  He states with pulse of 58 ok to take the cipro as recommended, would recommend avoiding if pulse below 50 which patient does not have.  RN advised ED precautions to patient.  Patient agrees with plan and verbalized understanding.    Shira GALVEZ RN

## 2018-08-06 ENCOUNTER — TELEPHONE (OUTPATIENT)
Dept: FAMILY MEDICINE | Facility: CLINIC | Age: 63
End: 2018-08-06

## 2018-08-13 ENCOUNTER — OFFICE VISIT (OUTPATIENT)
Dept: FAMILY MEDICINE | Facility: CLINIC | Age: 63
End: 2018-08-13
Payer: MEDICARE

## 2018-08-13 VITALS
OXYGEN SATURATION: 98 % | HEART RATE: 81 BPM | BODY MASS INDEX: 27.67 KG/M2 | HEIGHT: 69 IN | TEMPERATURE: 98 F | WEIGHT: 186.8 LBS | SYSTOLIC BLOOD PRESSURE: 120 MMHG | DIASTOLIC BLOOD PRESSURE: 72 MMHG

## 2018-08-13 DIAGNOSIS — R00.1 BRADYCARDIA: ICD-10-CM

## 2018-08-13 DIAGNOSIS — M79.7 FIBROMYALGIA: ICD-10-CM

## 2018-08-13 DIAGNOSIS — R41.3 MEMORY LOSS: Primary | ICD-10-CM

## 2018-08-13 PROCEDURE — 99214 OFFICE O/P EST MOD 30 MIN: CPT | Performed by: FAMILY MEDICINE

## 2018-08-13 NOTE — MR AVS SNAPSHOT
After Visit Summary   8/13/2018    Ina Otoole    MRN: [id number]           Patient Information     Date Of Birth          1955        Visit Information        Provider Department      8/13/2018 8:20 AM Verito Ashby MD [facility]        Today's Diagnoses     Memory loss    -  1       Follow-ups after your visit        Additional Services     NEUROPSYCHOLOGY REFERRAL       Your provider has referred you to:    [facility]: Adult Neuropsychology Clinic [facility] [phone] Preferred Provider: Marquez Boyer Ph.D., L.P., [facility]   [email or web address]    All scheduling is subject to the client's specific insurance plan & benefits, provider/location availability, and provider clinical specialities.  Please arrive 15 minutes early for your first appointment and bring your completed paperwork.    Please be aware that coverage of these services is subject to the terms and limitations of your health insurance plan.  Call member services at your health plan with any benefit or coverage questions.    Please bring the following to your appointment:  >>   Any x-rays, CTs or MRIs which have been performed.  Contact the facility where they were done to arrange for  prior to your scheduled appointment.  Any new CT, MRI or other procedures ordered by your specialist must be performed at a [facility] facility or coordinated by your clinic's referral office.    >>   List of current medications   >>   This referral request   >>   Any documents/labs given to you for this referral                  Who to contact     If you have questions or need follow up information about today's clinic visit or your schedule please contact [facility] directly at [phone].  Normal or non-critical lab and imaging results will be communicated to you by MyChart, letter or phone within 4 business days after the clinic has received the results. If you do  "not hear from us within 7 days, please contact the clinic through [employer] or phone. If you have a critical or abnormal lab result, we will notify you by phone as soon as possible.  Submit refill requests through [employer] or call your pharmacy and they will forward the refill request to us. Please allow 3 business days for your refill to be completed.          Additional Information About Your Visit        Care EveryWhere ID     This is your Care EveryWhere ID. This could be used by other organizations to access your [place] medical records  [id number]        Your Vitals Were     Pulse Temperature Height Pulse Oximetry BMI (Body Mass Index)       81 98  F (36.7  C) (Tympanic) 5' 9\" (1.753 m) 98% 27.59 kg/m2        Blood Pressure from Last 3 Encounters:   08/13/18 120/72   08/02/18 100/70   06/17/18 123/79    Weight from Last 3 Encounters:   08/13/18 186 lb 12.8 oz (84.7 kg)   08/02/18 183 lb 12.8 oz (83.4 kg)   06/17/18 178 lb (80.7 kg)              We Performed the Following     NEUROPSYCHOLOGY REFERRAL        Primary Care Provider Office Phone # Fax #    Verito Ashby [phone] [address] [facility] [address]        Equal Access to Services     GRUPO ANDERSON : Hadii annette ku hadasho Soomaali, waaxda luqadaha, qaybta kaalmada adeegyada, ronald angela hayelizn wilfrido browne . So [facility] [phone].    ATENCIÓN: Si habla español, tiene a shipman disposición servicios gratuitos de asistencia lingüística. Llame al [phone].    We comply with applicable federal civil rights laws and [place] laws. We do not discriminate on the basis of race, color, national origin, age, disability, sex, sexual orientation, or gender identity.            Thank you!     Thank you for choosing [facility]  for your care. Our goal is always to provide you with excellent care. Hearing back from our patients is one way we can continue to improve our services. Please take a few minutes to " complete the written survey that you may receive in the mail after your visit with us. Thank you!             Your Updated Medication List - Protect others around you: Learn how to safely use, store and throw away your medicines at www.disposemymeds.org.          This list is accurate as of 8/13/18  8:58 AM.  Always use your most recent med list.                   Brand Name Dispense Instructions for use Diagnosis    ADVIL PO      Take 400 mg by mouth 2 times daily        ASPIRIN PO      Take 81 mg by mouth daily        ciprofloxacin 500 MG tablet    CIPRO    28 tablet    Take 1 tablet (500 mg) by mouth 2 times daily    Urinary tract infection without hematuria, site unspecified       ranitidine 150 MG tablet    ZANTAC    60 tablet    Take 1 tablet (150 mg) by mouth 2 times daily (Needs follow-up appointment for this medication)    Esophageal reflux

## 2018-08-13 NOTE — PROGRESS NOTES
"   SUBJECTIVE:   Ina Otoole is a 63 year old female who presents to clinic today for the following health issues:    Chief Complaint   Patient presents with     Fibromyalgia     patient states she was diagnosed several years ago with fibromyalgia and treated at the time but nothing seemed to work for her.  She is now having symptoms such as brain fog, having a hard time getting words out, exhausted, generalized muscle aches.  Sister is a nurse and has told patient to ask about LDN.  She would like to discuss today.           63 yr old female who comes in to talk about Fibromyalgia. Says she was diagnosed about 30 yrs ago and over the years she has tried different things including Lyrica . She reports nothing seemed to have worked. She says she has dealt with it over time. Patientr reports that she heard of this new drug called LDN and was wondering If it will help. LDN is low dose naltrexone. I explained to the patient that the medication is a new one that has not been on the market for too long. She says it was prescribed to a friend by a doctor out of [facility]. She will find out more about the doctor and we will research more on this medication.    Patient also has been experiencing what she describes as a \"brain fog\" She reports that she spaces it out on many occasions and sometimes has to think hard before she can comprehend what is being said to her. She says she looks at pictures and cannot figure out the picture. She gave another example of one time she was going to her duaghter's house which will usually take 2 hours but instead it took her 4 hours . She is not sure exactly what happened on that occasion.   Patient also mentioned that family members have also observed that she is quite forgetful.     At her last appointment which was a pre op, she was found to be bradycardic. She is also wondering if this has anything to do with her being forgetful. She has not had any fainting spells no chest pain, " no dizziness. It was an incidental finding while working her up for her pre op.     Problem list and histories reviewed & adjusted, as indicated.  Additional history: as documented    Patient Active Problem List   Diagnosis     Esophageal reflux     Myalgia and myositis     Goiter     Pain in joint, lower leg     OA (osteoarthritis) of knee     CARDIOVASCULAR SCREENING; LDL GOAL LESS THAN 160     Calculus of gallbladder with other cholecystitis, without mention of obstruction     Renal calculus or stone     IBS (irritable bowel syndrome)     Advanced directives, counseling/discussion     Preop general physical exam     Thrombophlebitis leg (H)     Past Surgical History:   Procedure Laterality Date     ARTHROSCOPY KNEE RT/LT      left - meniscus repair     COLONOSCOPY N/A 8/24/2017    Procedure: COLONOSCOPY;  Colonoscopy  ;  Surgeon: Abhishek Escobedo MD;  Location: [facility]     KNEE SURGERY  9/2013    left knee replacement     LAPAROSCOPIC CHOLECYSTECTOMY  6/29/2011    Procedure:LAPAROSCOPIC CHOLECYSTECTOMY; Surgeon:SUMAYA ZAMORANO; Location:[facility]     STONE ANALYSIS  2011     THYROID BIOPSY         Social History   Substance Use Topics     Smoking status: Former Smoker     Quit date: 2/22/2000     Smokeless tobacco: Never Used     Alcohol use Yes      Comment: only for special occassions     Family History   Problem Relation Age of Onset     Diabetes Mother      Hypertension Mother      Cerebrovascular Disease Mother      Cancer Mother      melanoma     Respiratory Father      copd     C.A.D. Father      Cancer - colorectal Father      Breast Cancer Maternal Grandmother      Diabetes Paternal Grandmother      Hypertension Brother      Hypertension Sister      Diabetes Sister      Neurologic Disorder Sister      ms     Other - See Comments Daughter      Transverse Myelitis     Asthma No family hx of      Prostate Cancer No family hx of          Current Outpatient Prescriptions   Medication Sig Dispense Refill  "    ASPIRIN PO Take 81 mg by mouth daily       ciprofloxacin (CIPRO) 500 MG tablet Take 1 tablet (500 mg) by mouth 2 times daily 28 tablet 0     Ibuprofen (ADVIL PO) Take 400 mg by mouth 2 times daily        ranitidine (ZANTAC) 150 MG tablet Take 1 tablet (150 mg) by mouth 2 times daily (Needs follow-up appointment for this medication) 60 tablet 0     Allergies   Allergen Reactions     Sulfa Drugs Hives     BP Readings from Last 3 Encounters:   08/13/18 120/72   08/02/18 100/70   06/17/18 123/79    Wt Readings from Last 3 Encounters:   08/13/18 186 lb 12.8 oz (84.7 kg)   08/02/18 183 lb 12.8 oz (83.4 kg)   06/17/18 178 lb (80.7 kg)                  Labs reviewed in EPIC    Reviewed and updated as needed this visit by clinical staff  Tobacco  Allergies  Meds  Problems  Med Hx  Surg Hx  Fam Hx  Soc Hx        Reviewed and updated as needed this visit by Provider  Allergies  Meds  Problems         ROS:  Constitutional, HEENT, cardiovascular, pulmonary, gi and gu systems are negative, except as otherwise noted.    OBJECTIVE:     /72  Pulse 81  Temp 98  F (36.7  C) (Tympanic)  Ht 5' 9\" (1.753 m)  Wt 186 lb 12.8 oz (84.7 kg)  SpO2 98%  BMI 27.59 kg/m2  Body mass index is 27.59 kg/(m^2).  GENERAL: healthy, alert and no distress  EYES: Eyes grossly normal to inspection, PERRL and conjunctivae and sclerae normal  HENT: ear canals and TM's normal, nose and mouth without ulcers or lesions  NECK: no adenopathy, no asymmetry, masses, or scars and thyroid normal to palpation  RESP: lungs clear to auscultation - no rales, rhonchi or wheezes  CV: regular rate and rhythm, normal S1 S2, no S3 or S4, no murmur, click or rub, no peripheral edema and peripheral pulses strong  ABDOMEN: soft, nontender, no hepatosplenomegaly, no masses and bowel sounds normal  MS: no gross musculoskeletal defects noted, no edema  SKIN: no suspicious lesions or rashes    Diagnostic Test Results:  none     ASSESSMENT/PLAN:   (R41.3) " Memory loss  (primary encounter diagnosis)  Comment: Had a long discussion with patient, asked that she go in for a neuro psych evaluation   Plan: NEUROPSYCHOLOGY REFERRAL    (R00.1) Bradycardia  Comment: She may need a Holter monitor   Plan: Watch     (M79.7) Fibromyalgia  Comment: Had a long discussion with patient , I am not familiar with the LDN, I asked if she could find out about the physician who prescribed the medication and we will also research more on this .  Plan: As above     FUTURE APPOINTMENTS:       - Follow-up visit as needed    Verito Ashby MD  [facility]

## 2018-08-16 ASSESSMENT — MIFFLIN-ST. JEOR: SCORE: 1424.46

## 2018-08-20 ENCOUNTER — ANESTHESIA - HEALTHEAST (OUTPATIENT)
Dept: SURGERY | Facility: CLINIC | Age: 63
End: 2018-08-20

## 2018-08-20 ENCOUNTER — TRANSFERRED RECORDS (OUTPATIENT)
Dept: HEALTH INFORMATION MANAGEMENT | Facility: CLINIC | Age: 63
End: 2018-08-20

## 2018-08-20 ENCOUNTER — SURGERY - HEALTHEAST (OUTPATIENT)
Dept: SURGERY | Facility: CLINIC | Age: 63
End: 2018-08-20

## 2018-08-20 ASSESSMENT — MIFFLIN-ST. JEOR: SCORE: 1431.55

## 2018-08-24 ENCOUNTER — NURSING HOME VISIT (OUTPATIENT)
Dept: GERIATRICS | Facility: CLINIC | Age: 63
End: 2018-08-24
Payer: MEDICARE

## 2018-08-24 VITALS
RESPIRATION RATE: 18 BRPM | TEMPERATURE: 99.1 F | DIASTOLIC BLOOD PRESSURE: 67 MMHG | WEIGHT: 188.4 LBS | BODY MASS INDEX: 27.82 KG/M2 | SYSTOLIC BLOOD PRESSURE: 102 MMHG | HEART RATE: 108 BPM

## 2018-08-24 DIAGNOSIS — M17.11 PRIMARY OSTEOARTHRITIS OF RIGHT KNEE: Primary | ICD-10-CM

## 2018-08-24 DIAGNOSIS — K21.9 GASTROESOPHAGEAL REFLUX DISEASE WITHOUT ESOPHAGITIS: ICD-10-CM

## 2018-08-24 DIAGNOSIS — Z96.651 S/P TOTAL KNEE ARTHROPLASTY, RIGHT: ICD-10-CM

## 2018-08-24 DIAGNOSIS — R53.81 PHYSICAL DECONDITIONING: ICD-10-CM

## 2018-08-24 DIAGNOSIS — M79.7 FIBROMYALGIA: ICD-10-CM

## 2018-08-24 DIAGNOSIS — D62 ACUTE BLOOD LOSS ANEMIA: ICD-10-CM

## 2018-08-24 DIAGNOSIS — K59.01 SLOW TRANSIT CONSTIPATION: ICD-10-CM

## 2018-08-24 DIAGNOSIS — R68.89 FORGETFULNESS: ICD-10-CM

## 2018-08-24 DIAGNOSIS — K58.1 IRRITABLE BOWEL SYNDROME WITH CONSTIPATION: ICD-10-CM

## 2018-08-24 PROCEDURE — 99310 SBSQ NF CARE HIGH MDM 45: CPT | Performed by: NURSE PRACTITIONER

## 2018-08-24 RX ORDER — AMOXICILLIN 250 MG
2 CAPSULE ORAL 2 TIMES DAILY
COMMUNITY

## 2018-08-24 RX ORDER — POLYETHYLENE GLYCOL 3350 17 G/17G
17 POWDER, FOR SOLUTION ORAL DAILY
COMMUNITY
End: 2019-10-17

## 2018-08-24 NOTE — PROGRESS NOTES
"[facility]  PRIMARY CARE PROVIDER AND CLINIC:  Verito Ashby [address] [facility] / [facility] [address]  Chief Complaint   Patient presents with     Hospital F/U     [facility] Medical Record Number:  [id number]    HPI:    Ina Otoole is a 63 year old  (1955),[facility] .  Hospital stay 8/20/18 through 8/23/18.  Admitted to this facility for  rehab, medical management and nursing care.  HPI information obtained from: facility chart records, facility staff, patient report and [facility] chart review.        Ina Otoole is a 63 year old woman with PMH of osteoarthritis with previous left TKA, GERD, IBS, fibromyalgia, renal calculi, and gall stones who underwent elective right total knee replacement with Dr. Haji on 8/20/2018. Prior to surgery she was treated with course of Cipro for a UTI. Post-operative course was uncomplicated. As she lives alone, she felt she needed further rehab so she was discharged to TCU on POD#3 for further rehab and medical management.     Current issues are:      Primary osteoarthritis of right knee  S/P total knee arthroplasty, right  Physical deconditioning  Reports she does have some pain in her knee, but feels that it has been well controlled on dilaudid. She is currently on oxycodone 5-10mg q4h PRN, APAP 1000mg BID, ibuprofen 400mg BID. She has been icing knee. She is on ASA 325mg daily for DVT prophylaxis. She has been using a CPM. She is working with PT and OT.     Slow transit constipation  Reports she has only had one small BM in the past week, reports bloating, nausea, belching, feels constipated. Denies any abdominal pain. She is on PRN MOM and senna 1 tab BID.     Forgetfulness  Reports she has had a \"foggy\" brain recently, and that she plans to undergo cognitive testing once she leaves the TCU. States she does not want to use narcotics at home because of this because she is " concerned she will not be able to use them safely.     Acute blood loss anemia  Hgb pre-op was 14.1, dropped to 11 post-op. She denies any lightheadedness or dizziness.      CODE STATUS/ADVANCE DIRECTIVES DISCUSSION:   CPR/Full code   Patient's living condition: lives alone    ALLERGIES:Sulfa drugs  PAST MEDICAL HISTORY:  has a past medical history of Esophageal reflux; Goiter, unspecified; and Myalgia and myositis, unspecified.  PAST SURGICAL HISTORY:  has a past surgical history that includes thyroid biopsy; arthroscopy knee rt/lt; Laparoscopic cholecystectomy (6/29/2011); Stone analysis (2011); knee surgery (9/2013); and Colonoscopy (N/A, 8/24/2017).  FAMILY HISTORY: family history includes Breast Cancer in her maternal grandmother; C.A.D. in her father; Cancer in her mother; Cancer - colorectal in her father; Cerebrovascular Disease in her mother; Diabetes in her mother, paternal grandmother, and sister; Hypertension in her brother, mother, and sister; Neurologic Disorder in her sister; Other - See Comments in her daughter; Respiratory in her father. There is no history of Asthma or Prostate Cancer.  SOCIAL HISTORY:  reports that she quit smoking about 18 years ago. She has never used smokeless tobacco. She reports that she drinks alcohol. She reports that she does not use illicit drugs.    Post Discharge Medication Reconciliation Status: discharge medications reconciled and changed, per note/orders (see AVS).  Current Outpatient Prescriptions   Medication Sig Dispense Refill     ACETAMINOPHEN PO Take 1,000 mg by mouth 2 times daily       ASPIRIN EC PO Take 325 mg by mouth 2 times daily       Ibuprofen (ADVIL PO) Take 400 mg by mouth 2 times daily        magnesium hydroxide (MILK OF MAGNESIA) 400 MG/5ML suspension Take 30 mLs by mouth daily as needed for constipation or heartburn       OXYCODONE HCL PO Take 5-10 mg by mouth every 4 hours as needed       OXYCODONE HCL PO Take 5 mg by mouth 2 times daily        polyethylene glycol (MIRALAX/GLYCOLAX) Packet Take 17 g by mouth daily       ranitidine (ZANTAC) 150 MG tablet Take 1 tablet (150 mg) by mouth 2 times daily (Needs follow-up appointment for this medication) 60 tablet 0     senna-docusate (SENOKOT-S;PERICOLACE) 8.6-50 MG per tablet Take 2 tablets by mouth 2 times daily          ROS:  10 point ROS of systems including Constitutional, Eyes, Respiratory, Cardiovascular, Gastroenterology, Genitourinary, Integumentary, Muscularskeletal, Psychiatric were all negative except for pertinent positives noted in my HPI.    Exam:  /67  Pulse 108  Temp 99.1  F (37.3  C)  Resp 18  Wt 188 lb 6.4 oz (85.5 kg)  BMI 27.82 kg/m2  GENERAL APPEARANCE:  Alert, in no distress, oriented, cooperative  RESP:  respiratory effort and palpation of chest normal, lungs clear to auscultation , no respiratory distress  CV:  Palpation and auscultation of heart done , regular rate and rhythm, no murmur, rub, or gallop, +2 pedal pulses, peripheral edema 2+ in nonpitting RLE  ABDOMEN:  normal bowel sounds, soft, nontender, no hepatosplenomegaly or other masses, no guarding or rebound  M/S:   Gait and station abnormal decreased ROM, tenderness to right knee, no other gross deformities  SKIN:  Inspection of skin and subcutaneous tissue baseline, wound healing well, no signs of infection prineo tape to right knee C/D/I, sutures intact, no drainage, erythema, mmoderate edema, no excessive warmth  NEURO:   Cranial nerves 2-12 are normal tested and grossly at patient's baseline, Examination of sensation by touch normal  PSYCH:  oriented X 3, normal insight, judgement and memory, affect and mood normal    Lab/Diagnostic data:     CBC RESULTS:   Recent Labs   Lab Test  08/02/18   1157  08/29/16   1405   WBC  6.4  7.1   RBC  4.58  4.84   HGB  14.1  14.4   HCT  42.0  43.2   MCV  92  89   MCH  30.8  29.8   MCHC  33.6  33.3   RDW  12.2  12.3   PLT  182  210       Last Basic Metabolic Panel:  Recent Labs    Lab Test  08/29/16   1405 09/05/13 09/03/13 04/23/12   1156  07/28/11   0558   NA  140   --    --    --   141   POTASSIUM  3.8  3.9  3.8   --   4.3   CHLORIDE  107   --    --    --   109   HAYDEN  9.5   --    --    --   8.6   CO2  28   --    --    --   25   BUN  16   --    --    --   19   CR  0.80   --   0.78   --   0.81   GLC  105*   --    --   90  107*       Liver Function Studies -   Recent Labs   Lab Test  06/05/11   1750  02/23/11   1026   PROTTOTAL  7.2  6.6*   ALBUMIN  4.3  4.0   BILITOTAL  1.7*  0.3   ALKPHOS  91  56   AST  603*  16   ALT  580*  25       TSH   Date Value Ref Range Status   02/23/2011 1.66 0.4 - 5.0 mU/L Final   09/08/2009 1.34 0.4 - 5.0 mU/L Final       ASSESSMENT/PLAN:  (M17.11) Primary osteoarthritis of right knee  (primary encounter diagnosis)  (Z96.651) S/P total knee arthroplasty, right  (R53.81) Physical deconditioning  Comment: Pain controlled, healing well, no s/s of infection  Plan: add scheduled oxycodone as below per patient request for better pain control with therapy, will wean plan to wean of narcotics prior to discharge per patient request, other medications as above, PT/OT, ice PRN, f/u with ortho as scheduled, continue ASA x 42 days    (K59.01) Slow transit constipation  Comment: likely d/t decreased fluid intake, decreased mobility and narcotic use  Plan: increased senna to 2 tabs BID, add miralax, may give supp today if no BM by this evening, encourage fluid intake    (K21.9) Gastroesophageal reflux disease without esophagitis  Comment: stable  Plan: continue zantac, will change ASA to enteric coated given ASA and NSAID use    (K58.1) Irritable bowel syndrome with constipation  Comment: stable  Plan: bowel regimen as above    (M79.7) Fibromyalgia  Comment: chronic, patient does note take on control medications - states she ignores the pain  Plan: monitor, F/u with PCP    (R68.89) Forgetfulness  Comment: ? R/t early onset dementia, fibromyalgia  Plan: therapy to complete  cognitive testing in TCU, f/u with PCP     (D62) Acute blood loss anemia  Comment: stable  Plan: check Hgb for stability, monitor for s/s of bleeding.     Orders:  1. Increase senna-s to 2 tab PO BID hold for loose stools Dx: Constipation  2. Miralax 17 gm PO daily hold for loose stools Dx: Constipation  3. change asa to 325 mg asa EC PO BID x42 days Dx: DVT Prophylaxis  4. If no BM by this evening give bisacodyl 10 mg suppository rectally Dx: Constipation  5. Add oxycodone 5 mg PO BID at 0800, 1200 in addition to PRN dosing Dx: Pain  6. Hgb on 8/27 Dx: Anemia    Total time spent with patient visit at the skilled nursing facility was 35 min including patient visit and review of past records. Greater than 50% of total time spent with counseling and coordinating care due to discussion of medical history, recent surgery, pain control and plan of care in TCU[facility]    Electronically signed by:  DORITA Grajeda CNP

## 2018-08-24 NOTE — LETTER
"    8/24/2018        RE: Ina Otoole  [address]  [facility] [address]        [facility]  PRIMARY CARE PROVIDER AND CLINIC:  Verito Ashby [address] [facility] / [facility] [place] [address]  Chief Complaint   Patient presents with     Hospital F/U     [facility] Record Number:  [id number]    HPI:    Ina Otoole is a 63 year old  (1955),admitted to the [facility] from [facility] .  Hospital stay 8/20/18 through 8/23/18.  Admitted to this facility for  rehab, medical management and nursing care.  HPI information obtained from: facility chart records, facility staff, patient report and [facility] chart review.        Ina Otoole is a 63 year old woman with PMH of osteoarthritis with previous left TKA, GERD, IBS, fibromyalgia, renal calculi, and gall stones who underwent elective right total knee replacement with Dr. Haji on 8/20/2018. Prior to surgery she was treated with course of Cipro for a UTI. Post-operative course was uncomplicated. As she lives alone, she felt she needed further rehab so she was discharged to TCU on POD#3 for further rehab and medical management.     Current issues are:      Primary osteoarthritis of right knee  S/P total knee arthroplasty, right  Physical deconditioning  Reports she does have some pain in her knee, but feels that it has been well controlled on dilaudid. She is currently on oxycodone 5-10mg q4h PRN, APAP 1000mg BID, ibuprofen 400mg BID. She has been icing knee. She is on ASA 325mg daily for DVT prophylaxis. She has been using a CPM. She is working with PT and OT.     Slow transit constipation  Reports she has only had one small BM in the past week, reports bloating, nausea, belching, feels constipated. Denies any abdominal pain. She is on PRN MOM and senna 1 tab BID.     Forgetfulness  Reports she has had a \"foggy\" brain recently, and that she plans to undergo cognitive testing once she leaves the TCU. " States she does not want to use narcotics at home because of this because she is concerned she will not be able to use them safely.     Acute blood loss anemia  Hgb pre-op was 14.1, dropped to 11 post-op. She denies any lightheadedness or dizziness.      CODE STATUS/ADVANCE DIRECTIVES DISCUSSION:   CPR/Full code   Patient's living condition: lives alone    ALLERGIES:Sulfa drugs  PAST MEDICAL HISTORY:  has a past medical history of Esophageal reflux; Goiter, unspecified; and Myalgia and myositis, unspecified.  PAST SURGICAL HISTORY:  has a past surgical history that includes thyroid biopsy; arthroscopy knee rt/lt; Laparoscopic cholecystectomy (6/29/2011); Stone analysis (2011); knee surgery (9/2013); and Colonoscopy (N/A, 8/24/2017).  FAMILY HISTORY: family history includes Breast Cancer in her maternal grandmother; C.A.D. in her father; Cancer in her mother; Cancer - colorectal in her father; Cerebrovascular Disease in her mother; Diabetes in her mother, paternal grandmother, and sister; Hypertension in her brother, mother, and sister; Neurologic Disorder in her sister; Other - See Comments in her daughter; Respiratory in her father. There is no history of Asthma or Prostate Cancer.  SOCIAL HISTORY:  reports that she quit smoking about 18 years ago. She has never used smokeless tobacco. She reports that she drinks alcohol. She reports that she does not use illicit drugs.    Post Discharge Medication Reconciliation Status: discharge medications reconciled and changed, per note/orders (see AVS).  Current Outpatient Prescriptions   Medication Sig Dispense Refill     ACETAMINOPHEN PO Take 1,000 mg by mouth 2 times daily       ASPIRIN EC PO Take 325 mg by mouth 2 times daily       Ibuprofen (ADVIL PO) Take 400 mg by mouth 2 times daily        magnesium hydroxide (MILK OF MAGNESIA) 400 MG/5ML suspension Take 30 mLs by mouth daily as needed for constipation or heartburn       OXYCODONE HCL PO Take 5-10 mg by mouth every 4  hours as needed       OXYCODONE HCL PO Take 5 mg by mouth 2 times daily       polyethylene glycol (MIRALAX/GLYCOLAX) Packet Take 17 g by mouth daily       ranitidine (ZANTAC) 150 MG tablet Take 1 tablet (150 mg) by mouth 2 times daily (Needs follow-up appointment for this medication) 60 tablet 0     senna-docusate (SENOKOT-S;PERICOLACE) 8.6-50 MG per tablet Take 2 tablets by mouth 2 times daily          ROS:  10 point ROS of systems including Constitutional, Eyes, Respiratory, Cardiovascular, Gastroenterology, Genitourinary, Integumentary, Muscularskeletal, Psychiatric were all negative except for pertinent positives noted in my HPI.    Exam:  /67  Pulse 108  Temp 99.1  F (37.3  C)  Resp 18  Wt 188 lb 6.4 oz (85.5 kg)  BMI 27.82 kg/m2  GENERAL APPEARANCE:  Alert, in no distress, oriented, cooperative  RESP:  respiratory effort and palpation of chest normal, lungs clear to auscultation , no respiratory distress  CV:  Palpation and auscultation of heart done , regular rate and rhythm, no murmur, rub, or gallop, +2 pedal pulses, peripheral edema 2+ in nonpitting RLE  ABDOMEN:  normal bowel sounds, soft, nontender, no hepatosplenomegaly or other masses, no guarding or rebound  M/S:   Gait and station abnormal decreased ROM, tenderness to right knee, no other gross deformities  SKIN:  Inspection of skin and subcutaneous tissue baseline, wound healing well, no signs of infection prineo tape to right knee C/D/I, sutures intact, no drainage, erythema, mmoderate edema, no excessive warmth  NEURO:   Cranial nerves 2-12 are normal tested and grossly at patient's baseline, Examination of sensation by touch normal  PSYCH:  oriented X 3, normal insight, judgement and memory, affect and mood normal    Lab/Diagnostic data:     CBC RESULTS:   Recent Labs   Lab Test  08/02/18   1157  08/29/16   1405   WBC  6.4  7.1   RBC  4.58  4.84   HGB  14.1  14.4   HCT  42.0  43.2   MCV  92  89   MCH  30.8  29.8   MCHC  33.6  33.3    RDW  12.2  12.3   PLT  182  210       Last Basic Metabolic Panel:  Recent Labs   Lab Test  08/29/16   1405 09/05/13 09/03/13 04/23/12   1156  07/28/11   0558   NA  140   --    --    --   141   POTASSIUM  3.8  3.9  3.8   --   4.3   CHLORIDE  107   --    --    --   109   HAYDEN  9.5   --    --    --   8.6   CO2  28   --    --    --   25   BUN  16   --    --    --   19   CR  0.80   --   0.78   --   0.81   GLC  105*   --    --   90  107*       Liver Function Studies -   Recent Labs   Lab Test  06/05/11   1750  02/23/11   1026   PROTTOTAL  7.2  6.6*   ALBUMIN  4.3  4.0   BILITOTAL  1.7*  0.3   ALKPHOS  91  56   AST  603*  16   ALT  580*  25       TSH   Date Value Ref Range Status   02/23/2011 1.66 0.4 - 5.0 mU/L Final   09/08/2009 1.34 0.4 - 5.0 mU/L Final       ASSESSMENT/PLAN:  (M17.11) Primary osteoarthritis of right knee  (primary encounter diagnosis)  (Z96.651) S/P total knee arthroplasty, right  (R53.81) Physical deconditioning  Comment: Pain controlled, healing well, no s/s of infection  Plan: add scheduled oxycodone as below per patient request for better pain control with therapy, will wean plan to wean of narcotics prior to discharge per patient request, other medications as above, PT/OT, ice PRN, f/u with ortho as scheduled, continue ASA x 42 days    (K59.01) Slow transit constipation  Comment: likely d/t decreased fluid intake, decreased mobility and narcotic use  Plan: increased senna to 2 tabs BID, add miralax, may give supp today if no BM by this evening, encourage fluid intake    (K21.9) Gastroesophageal reflux disease without esophagitis  Comment: stable  Plan: continue zantac, will change ASA to enteric coated given ASA and NSAID use    (K58.1) Irritable bowel syndrome with constipation  Comment: stable  Plan: bowel regimen as above    (M79.7) Fibromyalgia  Comment: chronic, patient does note take on control medications - states she ignores the pain  Plan: monitor, F/u with PCP    (R68.75)  Forgetfulness  Comment: ? R/t early onset dementia, fibromyalgia  Plan: therapy to complete cognitive testing in TCU, f/u with PCP     (D62) Acute blood loss anemia  Comment: stable  Plan: check Hgb for stability, monitor for s/s of bleeding.     Orders:  1. Increase senna-s to 2 tab PO BID hold for loose stools Dx: Constipation  2. Miralax 17 gm PO daily hold for loose stools Dx: Constipation  3. change asa to 325 mg asa EC PO BID x42 days Dx: DVT Prophylaxis  4. If no BM by this evening give bisacodyl 10 mg suppository rectally Dx: Constipation  5. Add oxycodone 5 mg PO BID at 0800, 1200 in addition to PRN dosing Dx: Pain  6. Hgb on 8/27 Dx: Anemia    Total time spent with patient visit at the skilled nursing facility was 35 min including patient visit and review of past records. Greater than 50% of total time spent with counseling and coordinating care due to discussion of medical history, recent surgery, pain control and plan of care in TCU2    Electronically signed by:  DORITA Grajeda CNP      Sincerely,        DORITA Grajeda CNP

## 2018-08-27 ENCOUNTER — HOSPITAL LABORATORY (OUTPATIENT)
Facility: OTHER | Age: 63
End: 2018-08-27

## 2018-08-27 LAB — HGB BLD-MCNC: 10.5 G/DL (ref 11.7–15.7)

## 2018-08-28 ENCOUNTER — NURSING HOME VISIT (OUTPATIENT)
Dept: GERIATRICS | Facility: CLINIC | Age: 63
End: 2018-08-28
Payer: MEDICARE

## 2018-08-28 VITALS
RESPIRATION RATE: 20 BRPM | SYSTOLIC BLOOD PRESSURE: 160 MMHG | HEIGHT: 67 IN | HEART RATE: 74 BPM | WEIGHT: 188.4 LBS | TEMPERATURE: 98 F | DIASTOLIC BLOOD PRESSURE: 79 MMHG | BODY MASS INDEX: 29.57 KG/M2

## 2018-08-28 DIAGNOSIS — Z47.1 AFTERCARE FOLLOWING RIGHT KNEE JOINT REPLACEMENT SURGERY: Primary | ICD-10-CM

## 2018-08-28 DIAGNOSIS — Z96.651 AFTERCARE FOLLOWING RIGHT KNEE JOINT REPLACEMENT SURGERY: Primary | ICD-10-CM

## 2018-08-28 DIAGNOSIS — K59.01 SLOW TRANSIT CONSTIPATION: ICD-10-CM

## 2018-08-28 DIAGNOSIS — D62 ANEMIA DUE TO BLOOD LOSS, ACUTE: ICD-10-CM

## 2018-08-28 DIAGNOSIS — K21.9 GASTROESOPHAGEAL REFLUX DISEASE WITHOUT ESOPHAGITIS: ICD-10-CM

## 2018-08-28 DIAGNOSIS — M79.7 FIBROMYALGIA: ICD-10-CM

## 2018-08-28 PROCEDURE — 99306 1ST NF CARE HIGH MDM 50: CPT | Performed by: INTERNAL MEDICINE

## 2018-08-28 NOTE — PROGRESS NOTES
"[facility]  INITIAL VISIT NOTE  August 28, 2018    PRIMARY CARE PROVIDER AND CLINIC:  Verito Ashby [address] [facility] / [facility] [address]    Chief Complaint   Patient presents with     Hospital F/U       HPI:    Ina Otoole is a 63 year old  (1955) female who was seen at [facility] on August 28, 2018 for an initial visit. Medical history is notable for osteoarthritis s/p L TKA and fibromyalgia. She was hospitalized at [facility] from 8/20/18 to 8/23/18 where she is s/p elective R total knee arthroplasty with Dr. Finch from [facility]. Surgery without complication. Post-op with acute blood loss anemia (Hgb 14.1 --> 11). She was admitted to this facility for medical management and rehab.     Today, Ms. Otoole is seen in her room. She is overall doing well. Pain is controlled and she is thinking about how to use less oxycodone when she goes home - currently \"needs\" 10 mg to get going in the morning and then 5 mg ahead of second therapy. No chest pain or dyspnea. No abdominal pain. No diarrhea or constipation. Sleeping well. Working with therapies. No concerns per nursing.     CODE STATUS:   CPR/Full code     ALLERGIES:     Allergies   Allergen Reactions     Sulfa Drugs Hives       PAST MEDICAL HISTORY:   Past Medical History:   Diagnosis Date     Esophageal reflux      Goiter, unspecified      Myalgia and myositis, unspecified        PAST SURGICAL HISTORY:   Past Surgical History:   Procedure Laterality Date     ARTHROSCOPY KNEE RT/LT      left - meniscus repair     COLONOSCOPY N/A 8/24/2017    Procedure: COLONOSCOPY;  Colonoscopy  ;  Surgeon: Abhishke Escobedo MD;  Location: [facility]     KNEE SURGERY  9/2013    left knee replacement     LAPAROSCOPIC CHOLECYSTECTOMY  6/29/2011    Procedure:LAPAROSCOPIC CHOLECYSTECTOMY; Surgeon:SUMAYA ZAMORANO; Location:[facility]     STONE ANALYSIS  2011     THYROID BIOPSY         FAMILY HISTORY:   Family History   Problem Relation Age " "of Onset     Diabetes Mother      Hypertension Mother      Cerebrovascular Disease Mother      Cancer Mother      melanoma     Respiratory Father      copd     C.A.D. Father      Cancer - colorectal Father      Breast Cancer Maternal Grandmother      Diabetes Paternal Grandmother      Hypertension Brother      Hypertension Sister      Diabetes Sister      Neurologic Disorder Sister      ms     Other - See Comments Daughter      Transverse Myelitis     Asthma No family hx of      Prostate Cancer No family hx of        SOCIAL HISTORY:   Lives alone     MEDICATIONS:  Current Outpatient Prescriptions   Medication Sig Dispense Refill     ACETAMINOPHEN PO Take 1,000 mg by mouth 2 times daily       ASPIRIN EC PO Take 325 mg by mouth 2 times daily       Ibuprofen (ADVIL PO) Take 400 mg by mouth 2 times daily        magnesium hydroxide (MILK OF MAGNESIA) 400 MG/5ML suspension Take 30 mLs by mouth daily as needed for constipation or heartburn       OXYCODONE HCL PO Take 5-10 mg by mouth every 4 hours as needed       OXYCODONE HCL PO Take 5 mg by mouth 2 times daily       polyethylene glycol (MIRALAX/GLYCOLAX) Packet Take 17 g by mouth daily       ranitidine (ZANTAC) 150 MG tablet Take 1 tablet (150 mg) by mouth 2 times daily (Needs follow-up appointment for this medication) 60 tablet 0     senna-docusate (SENOKOT-S;PERICOLACE) 8.6-50 MG per tablet Take 2 tablets by mouth 2 times daily          Post Discharge Medication Reconciliation Status: medication reconcilation previously completed during another office visit.    ROS:  10 point ROS neg other than the symptoms noted above in the HPI.    PHYSICAL EXAM:  /79  Pulse 74  Temp 98  F (36.7  C)  Resp 20  Ht 5' 7\" (1.702 m)  Wt 188 lb 6.4 oz (85.5 kg)  BMI 29.51 kg/m2   Gen: sitting in recliner, alert, cooperative and in no acute distress  HEENT: normocephalic; oropharynx clear  Card: RRR, S1, S2, no murmurs  Resp: lungs clear to auscultation bilaterally  GI: abdomen " soft, not-tender  MSK: normal muscle tone, no LE edema; R knee with soft velcro brace in place   Neuro: CX II-XII grossly in tact; ROM in all four extremities grossly in tact  Psych: alert and oriented x3; normal affect    LABORATORY/IMAGING DATA:  Reviewed as per Epic    ASSESSMENT/PLAN:    s/p Right Total Knee Arthroplasty for DJD (8/20/18)  Surgery without complication.   -- analgesia with APAP 1000 mg BID, ibuprofen 400 mg BID and oxycodone 5 mg BID and 5-10 mg q4 PRN   -- will change oxycodone PRN only upon discharge   -- DVT prophylaxis with  mg BID x42 days per ortho (last day 10/5/18)  -- ongoing PT/OT   -- will have home PT/OT/HHA at discharge  -- follow up with Dr. Haji with [facility] as scheduled    Acute Blood Loss Anemia  Secondary to above. No evidence of ongoing bleeding. Hgb 14.1 --> 11  -- repeat Hgb to ensure stability    Fibromylagia  Reports she takes two Advil in the morning and two before bed at home to manage her pain.   -- ongoing supportive cares     GERD  -- continues on ranitidine 150 mg BID PRN    Slow Transit Constipation  -- continues on Miralax 17g daily and Senna-S 2 tabs BID  -- adjust bowel regimen as needed      FGS TIME SPENT: Total time spent with patient visit at the skilled nursing facility was >45 min including patient visit and discussion with nursing, PT/OT, SW and NP. Greater than 50% of total time spent with counseling and coordinating care due to s/p TKA; fibromyalgia, discharge planning    Electronically signed by:  Mandy Rollins MD        Documentation of Face to Face and Certification for Home Health Services    I certify that patient, Ina Otoole is under my care and that I, or a Nurse Practitioner or Physician's Assistant working with me, had a face-to-face encounter that meets the physician face-to-face encounter requirements with this patient on: 8/28/2018.    This encounter with the patient was in whole, or in part, for the following medical  condition, which is the primary reason for Home Health Care: s/p R total knee arthroplasty, fibromyalgia, physical deconditioning     I certify that, based on my findings, the following services are medically necessary Home Health Services: Occupational Therapy, Physical Therapy and HHA    My clinical findings support the need for the above services because: Occupational Therapy Services are needed to assess and treat cognitive ability and address ADL safety due to impairment in ongoing strength, balance, endurance and ADLs. and Physical Therapy Services are needed to assess and treat the following functional impairments: ongoing strength, balance, endurance .    Further, I certify that my clinical findings support that this patient is homebound (i.e. absences from home require considerable and taxing effort and are for medical reasons or [employer] services or infrequently or of short duration when for other reasons) because: unable to leave home without assistance     Based on the above findings, I certify that this patient is confined to the home and needs intermittent skilled nursing care, physical therapy and/or speech therapy.  The patient is under my care, and I have initiated the establishment of the plan of care.  This patient will be followed by a physician who will periodically review the plan of care.    Physician/Provider to provide follow up care: Verito Ashby    [facility] certified Physician at time of discharge: Mandy Rollins MD  Electronically Signed by: Mandy Rollins MD

## 2018-09-04 ENCOUNTER — TELEPHONE (OUTPATIENT)
Dept: FAMILY MEDICINE | Facility: CLINIC | Age: 63
End: 2018-09-04

## 2018-09-04 ENCOUNTER — TELEPHONE (OUTPATIENT)
Dept: CARE COORDINATION | Facility: CLINIC | Age: 63
End: 2018-09-04

## 2018-09-04 NOTE — TELEPHONE ENCOUNTER
[facility] now requests orders and shares plan of care/discharge summaries for some patients through [employer].  Please REPLY TO THIS MESSAGE OR ROUTE BACK TO THE AUTHOR in order to give authorization for orders when needed.  This is considered a verbal order, you will still receive a faxed copy of orders for signature.  Thank you for your assistance in improving collaboration for our patients.    ORDER    PT 1x/wk 1, 3x/wk x2  Strength, balance, ROM and mobility

## 2018-09-04 NOTE — TELEPHONE ENCOUNTER
[facility] now requests orders and shares plan of care/discharge summaries for some patients through [employer].  Please REPLY TO THIS MESSAGE OR ROUTE BACK TO THE AUTHOR in order to give authorization for orders when needed.  This is considered a verbal order, you will still receive a faxed copy of orders for signature.  Thank you for your assistance in improving collaboration for our patients.      FYI, Our med profile is showing med duplications with her aspirin and advil. Please let me know if any changes need to be made.    Thank you!

## 2018-09-04 NOTE — TELEPHONE ENCOUNTER
S-(situation):  Patient was discharged from [facility] on  with orders for physical therapy and occupational therapy    B-(background):  [facility] does not have physical therapy and occupational therapy over the weekend.    A-(assessment):  Orders for physical therapy and occupational therapy have      R-(recommendations):  Need a verbal order for physical therapy and occupational therapy eval and treat effective 18. Thank you

## 2018-09-05 ENCOUNTER — TELEPHONE (OUTPATIENT)
Dept: CARE COORDINATION | Facility: CLINIC | Age: 63
End: 2018-09-05

## 2018-09-05 NOTE — TELEPHONE ENCOUNTER
[facility] now requests orders and shares plan of care/discharge summaries for some patients through [employer].  Please REPLY TO THIS MESSAGE OR ROUTE BACK TO THE AUTHOR in order to give authorization for orders when needed.  This is considered a verbal order, you will still receive a faxed copy of orders for signature.  Thank you for your assistance in improving collaboration for our patients.    ORDER    PT 1x/wk 1, 3x/wk x2  Strength, balance, ROM and mobility

## 2018-09-10 ENCOUNTER — TELEPHONE (OUTPATIENT)
Dept: FAMILY MEDICINE | Facility: CLINIC | Age: 63
End: 2018-09-10

## 2018-09-10 DIAGNOSIS — Z96.651 HISTORY OF TOTAL RIGHT KNEE REPLACEMENT: Primary | ICD-10-CM

## 2018-09-10 NOTE — TELEPHONE ENCOUNTER
Pt notified and given number to call if scheduling does not reach her in 2 business days.    Pallavi VILLAVICENCIO RN

## 2018-09-10 NOTE — TELEPHONE ENCOUNTER
Reason for Call: Request for an order or referral:    Order or referral being requested: Pt is being discharged from home care on 9/14/18. Home care told her she needs to get orders for OP PT to start 9/17/18    Date needed: as soon as possible    Has the patient been seen by the PCP for this problem? NO    Additional comments: Pt had right knee replacement.    Phone number Patient can be reached at:  Home number on file [phone] (home)    Best Time:  any    Can we leave a detailed message on this number?  YES    Call taken on 9/10/2018 at 11:06 AM by Love Porter

## 2018-09-20 ENCOUNTER — HOSPITAL ENCOUNTER (OUTPATIENT)
Dept: PHYSICAL THERAPY | Facility: CLINIC | Age: 63
Setting detail: THERAPIES SERIES
End: 2018-09-20
Attending: FAMILY MEDICINE
Payer: MEDICARE

## 2018-09-20 PROCEDURE — G8979 MOBILITY GOAL STATUS: HCPCS | Mod: GP,CI | Performed by: PHYSICAL THERAPIST

## 2018-09-20 PROCEDURE — 97110 THERAPEUTIC EXERCISES: CPT | Mod: GP | Performed by: PHYSICAL THERAPIST

## 2018-09-20 PROCEDURE — 97112 NEUROMUSCULAR REEDUCATION: CPT | Mod: GP | Performed by: PHYSICAL THERAPIST

## 2018-09-20 PROCEDURE — 40000718 ZZHC STATISTIC PT DEPARTMENT ORTHO VISIT: Performed by: PHYSICAL THERAPIST

## 2018-09-20 PROCEDURE — 97161 PT EVAL LOW COMPLEX 20 MIN: CPT | Mod: GP | Performed by: PHYSICAL THERAPIST

## 2018-09-20 PROCEDURE — G8978 MOBILITY CURRENT STATUS: HCPCS | Mod: GP,CK | Performed by: PHYSICAL THERAPIST

## 2018-09-20 NOTE — PROGRESS NOTES
09/20/18 1300   General Information   Type of Visit Initial OP Ortho PT Evaluation   Start of Care Date 09/20/18   Referring Physician Verito Ashby MD   Patient/Family Goals Statement To get back to normal walking, gardening; to work on balance to help care for mother (who uses a walker)   Orders Evaluate and Treat   Date of Order 09/10/18   Insurance Type Medicare   Medical Diagnosis History of right total knee replacement   General Information Comments Left TKA 2013   Body Part(s)   Body Part(s) Knee   Presentation and Etiology   Pertinent history of current problem (include personal factors and/or comorbidities that impact the POC) Patient reports: had right TKA on Aug 20th.  Healing has gone really well.  Surgeon was assisted with a robot.   Impairments B. Decreased WB tolerance;A. Pain;C. Swelling;D. Decreased ROM;E. Decreased flexibility;F. Decreased strength and endurance;G. Impaired balance;H. Impaired gait;K. Numbness   Functional Limitations perform activities of daily living;perform desired leisure / sports activities   Symptom Location Right global knee   How/Where did it occur From Degenerative Joint Disease   Onset date of current episode/exacerbation 08/20/18   Chronicity New   Pain rating (0-10 point scale) Best (/10);Worst (/10)   Best (/10) 4   Worst (/10) 6   Pain quality B. Dull;C. Aching   Frequency of pain/symptoms A. Constant   Pain/symptoms are: The same all the time   Pain/symptoms exacerbated by A. Sitting;B. Walking;E. Rest;G. Certain positions   Pain/symptoms eased by B. Walking;E. Changing positions;F. Certain positions;H. Cold;I. OTC medication(s)   Progression of symptoms since onset: Improved   Prior Level of Function   Prior Level of Function-Mobility Independent   Prior Level of Function-ADLs Independent   Current Level of Function   Current Community Support Family/friend caregiver   Patient role/employment history F. Retired   Living environment [facility]   Fall Risk  Screen   Fall screen completed by PT   Have you fallen 2 or more times in the past year? No   Have you fallen and had an injury in the past year? No   Is patient a fall risk? No   Functional Scales   Functional Scales Other   Other Scales  See LEFS   Knee Objective Findings   Side (if bilateral, select both right and left) Right   Integumentary  Joint line circumference right=44 cm; left=42 cm   Gait/Locomotion Ambulates with cane in left UE; reduced stance time right   Right Knee Extension PROM Left=0 deg; Right=lacking 2 deg   Right Knee Flexion AROM Right=122 deg; Left=130 deg   Right Knee Flexion Strength 4/5   Right Knee Extension Strength 3+/5   Right Hip Abduction Strength 3+/5   Right Gastrocnemius Flexibility 0 degrees dorsiflexion   Planned Therapy Interventions   Planned Therapy Interventions ADL retraining;balance training;gait training;joint mobilization;manual therapy;motor coordination training;neuromuscular re-education;ROM;strengthening;stretching   Planned Modality Interventions   Planned Modality Interventions Cryotherapy   Clinical Impression   Criteria for Skilled Therapeutic Interventions Met yes, treatment indicated   PT Diagnosis s/p right TKA 8/20/18   Influenced by the following impairments Pain, Decreased WBing, ROM, Strength, Balance, Gait    Functional limitations due to impairments Pain and difficulty performing ADL's, driving, stair climbing   Clinical Presentation Stable/Uncomplicated   Clinical Presentation Rationale (+) motivation; overall health; age   Clinical Decision Making (Complexity) Low complexity   Therapy Frequency 1 time/week   Predicted Duration of Therapy Intervention (days/wks) 8 weeks   Risk & Benefits of therapy have been explained Yes   Patient, Family & other staff in agreement with plan of care Yes   Clinical Impression Comments Ina arrives today 5 weeks s/p right TKA; she will benefit from skilled PT.   Education Assessment   Preferred Learning Style  Listening;Demonstration;Pictures/video   Barriers to Learning No barriers   ORTHO GOALS   PT Ortho Eval Goals 1;2;3   Ortho Goal 1   Goal Description Patient will have 0-125 degrees of right knee ROM to allow for normalized gait.   Target Date 10/11/18   Ortho Goal 2   Goal Description Patient will be able to perform tandem stance for >=30 seconds.   Target Date 11/01/18   Ortho Goal 3   Goal Description Patient will be independent with her HEP to reduce future occurrence of pain and disability.   Target Date 10/11/18   Total Evaluation Time   Total Evaluation Time 20   Therapy Certification   Certification date from 09/20/18   Certification date to 11/15/18   Medical Diagnosis History of right total knee replacement       Leeanna Jiang, PT, DPT  Doctor of Physical Therapy #[id number]  [facility]  [phone]  [email or web address]

## 2018-09-27 ENCOUNTER — HOSPITAL ENCOUNTER (OUTPATIENT)
Dept: PHYSICAL THERAPY | Facility: CLINIC | Age: 63
Setting detail: THERAPIES SERIES
End: 2018-09-27
Attending: FAMILY MEDICINE
Payer: MEDICARE

## 2018-09-27 PROCEDURE — 40000718 ZZHC STATISTIC PT DEPARTMENT ORTHO VISIT: Performed by: PHYSICAL THERAPIST

## 2018-09-27 PROCEDURE — 97112 NEUROMUSCULAR REEDUCATION: CPT | Mod: GP | Performed by: PHYSICAL THERAPIST

## 2018-09-27 PROCEDURE — 97110 THERAPEUTIC EXERCISES: CPT | Mod: GP | Performed by: PHYSICAL THERAPIST

## 2018-10-03 ENCOUNTER — HOSPITAL ENCOUNTER (OUTPATIENT)
Dept: PHYSICAL THERAPY | Facility: CLINIC | Age: 63
Setting detail: THERAPIES SERIES
End: 2018-10-03
Attending: FAMILY MEDICINE
Payer: MEDICARE

## 2018-10-03 PROCEDURE — 97110 THERAPEUTIC EXERCISES: CPT | Mod: GP | Performed by: PHYSICAL THERAPIST

## 2018-10-03 PROCEDURE — 97140 MANUAL THERAPY 1/> REGIONS: CPT | Mod: GP | Performed by: PHYSICAL THERAPIST

## 2018-10-03 PROCEDURE — 40000718 ZZHC STATISTIC PT DEPARTMENT ORTHO VISIT: Performed by: PHYSICAL THERAPIST

## 2018-10-04 ENCOUNTER — OFFICE VISIT (OUTPATIENT)
Dept: NEUROPSYCHOLOGY | Facility: CLINIC | Age: 63
End: 2018-10-04
Payer: MEDICARE

## 2018-10-04 DIAGNOSIS — F41.9 ANXIETY: ICD-10-CM

## 2018-10-04 DIAGNOSIS — F09 MENTAL DISORDER DUE TO GENERAL MEDICAL CONDITION: Primary | ICD-10-CM

## 2018-10-04 NOTE — MR AVS SNAPSHOT
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    -  1    Anxiety           Follow-ups after your visit        Your next 10 appointments already scheduled     Oct 09, 2018 11:30 AM CDT   Ortho Treatment with Leeanna Jiang, PT   [facility] ([facility])    [address]  [address]  [place] [address]   [phone]            Oct 16, 2018 10:00 AM CDT   Ortho Treatment with Leeanna Jiang, PT   [facility] ([facility])    [address]  [place] [id number] [phone]              Who to contact     Please call your clinic at [phone] to:    Ask questions about your health    Make or cancel appointments    Discuss your medicines    Learn about your test results    Speak to your doctor            Additional Information About Your Visit        Care EveryWhere ID     This is your Care EveryWhere ID. This could be used by other organizations to access your [facility] medical records  [id number]         Blood Pressure from Last 3 Encounters:   08/28/18 160/79   08/24/18 102/67   08/13/18 120/72    Weight from Last 3 Encounters:   08/28/18 85.5 kg (188 lb 6.4 oz)   08/24/18 85.5 kg (188 lb 6.4 oz)   08/13/18 84.7 kg (186 lb 12.8 oz)              We Performed the Following     [id number] TESTING, PER HR/PSYCHOLOGIST     NEUROPSYCH TESTING BY [facility]        Primary Care Provider Office Phone # Fax #    Verito Ashby [phone] [address] [address]        Equal Access to Services     GRUPO ANDERSON AH: Jose Angel Ty, sofiya lofton, henry bob  ronald blancolisbeth machadoaan ah. So [facility] [phone].    ATENCIÓN: Si trinala dom, tiene a shipman disposición servicios gratuitos de asistencia lingüística. Danii al [phone].    We comply with applicable federal civil rights laws and [place] laws. We do not discriminate on the basis of race, color, national origin, age, disability, sex, sexual orientation, or gender identity.            Thank you!     Thank you for choosing [facility] NEUROPSYCHOLOGY  for your care. Our goal is always to provide you with excellent care. Hearing back from our patients is one way we can continue to improve our services. Please take a few minutes to complete the written survey that you may receive in the mail after your visit with us. Thank you!             Your Updated Medication List - Protect others around you: Learn how to safely use, store and throw away your medicines at www.disposemymeds.org.          This list is accurate as of 10/4/18 11:59 PM.  Always use your most recent med list.                   Brand Name Dispense Instructions for use Diagnosis    ACETAMINOPHEN PO      Take 1,000 mg by mouth 2 times daily        ADVIL PO      Take 400 mg by mouth 2 times daily        ASPIRIN EC PO      Take 325 mg by mouth 2 times daily        magnesium hydroxide 400 MG/5ML suspension    MILK OF MAGNESIA     Take 30 mLs by mouth daily as needed for constipation or heartburn        * OXYCODONE HCL PO      Take 5-10 mg by mouth every 4 hours as needed        * OXYCODONE HCL PO      Take 5 mg by mouth 2 times daily        polyethylene glycol Packet    MIRALAX/GLYCOLAX     Take 17 g by mouth daily        ranitidine 150 MG tablet    ZANTAC    60 tablet    Take 1 tablet (150 mg) by mouth 2 times daily (Needs follow-up appointment for this medication)    Esophageal reflux       senna-docusate 8.6-50 MG per tablet    SENOKOT-S;PERICOLACE     Take 2 tablets by mouth 2 times daily        * Notice:  This list has 2  medication(s) that are the same as other medications prescribed for you. Read the directions carefully, and ask your doctor or other care provider to review them with you.

## 2018-10-05 NOTE — PROGRESS NOTES
Pt was seen for neuropsychological evaluation at the request of Dr. Verito Ashby for the purposes of diagnostic clarification and treatment planning. 2 hours of face-to-face testing were provided by this writer. Please see Dr. Marquez Boyer's report for a full interpretation of the findings.    Neo Dumont  Psychometrist

## 2018-10-05 NOTE — PROGRESS NOTES
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 She was accompanied to the evaluation her sister, Chely.     BACKGROUND INFORMATION / INTERVIEW FINDINGS    Records indicate that Ms. Otoole  medical history includes renal calculus, thrombophlebitis of the leg, irritable bowel syndrome, gallbladder calculus, osteoarthritis of the knee, leg pain, myalgia and myositis, goiter, and esophageal reflux. She has also been diagnosed with fibromyalgia. She has expressed concerns about difficulties with cognition, and brain fog. The current evaluation was requested by Dr. Verito Ashby, in this context.    On interview, Ms. Otoole confirmed the above history. She reported that she began having difficulties with her thinking a long time ago. She stated that in the distant past, she had sporadic or episodic difficulties with thinking. She described occasional problems with brain fog in the context of pain and unclear medical issues that took a while to be diagnosed. She reported that she had a lot of pain, and was ultimately diagnosed with a goiter and fibromyalgia 25 years ago. She reported that in this context, she had a mental breakdown, and became  catatonic.  She had a one week long psychiatric hospitalization approximately 20 years ago due to this issue. She noted that her  had , and she also is taking care of to school-age children. Her sister reported that the family provided assistance to her after she was discharged from the hospital, but her affect was blunted for a period of time. She indicated that the patient slowly recovered over the period of weeks, and was under the care  of a psychiatrist and therapist at that time. She was ultimately able to go back to work, and ultimately came off for psychiatric medications for a number of years. The patient indicated that her thinking was then stable for several years    The patient reported that within the last year, she began to again notice a change in her thinking. She stated that she has troubles tracking information and may lose her train of thought. She described an episode where she was driving home from her daughter s house with her young grandchildren, and the drive that should have taken two hours ended up taking four hours. The patient reported that she has little recollection of what took place during this episode. The patient s sister also noted that the patient has become more disengaged in social interactions. The patient reported that she is an introvert. The patient s sister also noted that the patient has considerable difficulties with mental flexibility and becomes anxious when there are changes that are unexpected. Additionally, she noted increased frustration. Later on, the patient s sister reported that she has noticed some changes in the patient s demeanor over the last five years that she speculated are pain related. She noted that the patient was having quite a bit of irritability on her past job, which ultimately contributed to her stopping working.    Regarding mental health, the patient reported that her current mood is interested, happy, and hopeful. She denied feeling depressed or anxious. She noted that she has some ups and downs in her mood, which she attributed to daily stressors of having to help care for her mother. She reported that she has not had mental health care for 20 years. She denied having ever had hallucinations. The patient s sister indicated that the patient s mood has been improved in the last month, since she had knee surgery and is experiencing less pain. She reported that there has not been an  associated improvement with her thinking in the last month. The patient denied suicidal ideation.    Regarding other medical background, Ms. Otoole denied prior TBI, stroke, or seizure. She denied prior abuse. She stated that her sleep is not good, but she forces herself to stay in bed for 10 hours each night. She reported that she typically sleeps six hours. She indicated that she slept seven hours the night before the current exam. She stated that she has pain in her neck, shoulders, back, and her right knee. She rated her pain at 6/10 at the time of the interview. Per records, her current medications include acetaminophen, aspirin, ibuprofen, magnesium hydroxide, oxycodone, polyethylene glycol, ranitidine, and senna-docusate. She denied alcohol, tobacco, or illicit drug use. She reported that she drank heavily from age 16 to age 28. She stated that she would drink to the point of intoxication two or three times per week. She reported that she stopped on her own when she learned that she was pregnant with her first child.    Ms. Otoole lives alone in an apartment. She sees family members regularly. She manages her own basic and instrumental daily activities. She drives. By way of background, she was  once. Her former  passed away while they were in the process of getting a divorce. She has two adult daughters, and four grandchildren. Her daughters live locally. Regarding educational background, she graduated from high school with poor to average grades. She stated that she was never held back, and did not have special education. Professionally, she worked for the telephone company in an office setting for 31 years. 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 She has been retired for the last five years.    BEHAVIORAL OBSERVATIONS  Ms. Otoole was polite and cooperative with the exam. She ambulated with a cane, as she has recently had knee surgery. She reported feeling exhausted  during testing. She also noted being overwhelmed at times during testing. Her speech was normal. Comprehension was normal. Her thought processes were broadly normal. Her mood was depressed and anxious with congruent affect. She frequently inquired about her performances on test measures. Her effort was good. The current results are felt to be an accurate depiction of her cognitive functioning.    RESULTS OF EXAM  Her performances on measures of neuropsychological functioning were as follows:      She was mildly disoriented to time, but was fully oriented to place and various aspects of personal information. She was able to state the names of the three most recent presidents in order, and also provided the names of two other recent past presidents. Performance on a measure of single word reading was high average. She obtained passing scores on stand-alone and embedded metrics of cognitive performance validity. Auditory attention for digits was average. Mental calculations were average. Learning of words in a list format was impaired. Delayed recall of list words was low average. Percent retention of list words was average. Delayed recognition of list words was average. Learning, delayed recall, and delayed recognition of story information were all average. Learning of simple geometric shapes and their spatial locations was high average. Delayed recall of the shapes and their locations was high average. Percent retention of the shapes was normal. Delayed recognition of the shapes was normal, and performed without error. Visuospatial judgments for variably oriented lines were low average. Visual problem solving with blocks was average. Her drawing of a complicated geometric figure was performed mildly below expectation, and was notable for inattention to the figure s details. Comprehension of phrases and short stories was average. Verbal associative fluency was average. Semantic verbal fluency was average. Naming to  confrontation was superior. Verbal abstract reasoning was high average. Speeded visual sequencing under focused attention was high average. A similar measure with a divided attention component was performed in the average to high average range. Speeded word reading was borderline impaired. Speeded color naming was performed in the borderline impaired to low average range. Speeded inhibition of an overlearned response was low average. Speeded visuomotor coding was average. Speeded fine motor dexterity was low average for the dominant, left hand, and average for the right hand.    She endorsed items consistent with minimal symptoms of depression, and mild symptoms of anxiety on self-report measures. On a longer measure of personality and emotional functioning, she responded in an open and forthright manner. Clinical scale elevations were consistent with low energy, and neurologic concerns. Those who respond similarly may tend to avoid social situations, and be disinterest in affiliating with others. Overall, this profile is consistent with introversion. She was also mistakenly administered a quality of life measure for patients with epilepsy. Her responses on this measure indicate some concerns about cognition, but generally good quality of life.    The patient s sister, Chely, completed a measure designed to assess for changes in personality. Her responses suggest highly elevated levels of irascibility, with mild elevations in the domains of executive/decision-making difficulties, disturbed social behavior, distress, and diminished motivation/hypo-emotionality. Her responses to this measure suggest that the patient was premorbidly perseverative, obsessive, labile, vulnerable to pressure, and had poor judgment. At present, she is irritable, has poor initiative, is labile, inflexible, continues to have poor judgment, is impatient, socially withdrawn, and vulnerable to pressure.     IMPRESSIONS  Ms. Otoole demonstrated  generally normal cognitive functioning across most assessed domains. Mild variability was identified on a couple of measures, but this variability does not coalesce into a clinically meaningful pattern. She is reporting mild symptoms of anxiety. Personality testing suggests introversion and mild somatic concerns. Additionally, her sister completed a questionnaire designed to assess for changes in personality. Her sister s responses suggest increased irritability and introversion. These personality changes could conceivably be seen as consistent with changes in prefrontal circuitry, although this is certainly speculative. She also has pain. The patient clearly demonstrated the capacity for normal memory, executive functioning, language, attention, cognitive speed, and psychomotor speed. In spite of these generally normal cognitive findings, I think that a follow-up exam would be helpful in a year, with the current results used as a baseline study.    RECOMMENDATIONS  Preliminary results and recommendations were provided to the patient over the telephone on 10/05/2018, and all questions were answered.     1. If medically indicated, consideration could be given to an MRI of her brain.    2. Given her description of an episode of losing time, consideration could be given to an EEG to rule out seizure tendency.     3. She is mildly anxious. If indicated, consideration could be given to treatment of her mental health.     4. Along similar lines, referral for psychotherapy services is recommended. One option would be [facility], with locations throughout the [facility] area. They can be reached by calling [phone].    5. Follow-up neuropsychological exam is recommended in one year. The current results can be used as a baseline at that time. However, I would be pleased to evaluate sooner if changes are noted or if clinically indicated.     Marquez Boyer, Ph.D., L.P., ABPP-CN   / Licensed  Psychologist [id number]  Department of Rehabilitation Medicine  Division of Adult Neuropsychology  [facility]    Time spent:  four hours professional time, including record review, data integration, and report writing (CPT 09554); two hours of testing administered by a psychometrist and interpreted by a neuropsychologist (CPT 01966). Diagnoses: F06.8, F41.9.

## 2018-10-09 ENCOUNTER — HOSPITAL ENCOUNTER (OUTPATIENT)
Dept: PHYSICAL THERAPY | Facility: CLINIC | Age: 63
Setting detail: THERAPIES SERIES
End: 2018-10-09
Attending: FAMILY MEDICINE
Payer: MEDICARE

## 2018-10-09 PROCEDURE — 40000718 ZZHC STATISTIC PT DEPARTMENT ORTHO VISIT: Performed by: PHYSICAL THERAPIST

## 2018-10-09 PROCEDURE — 97140 MANUAL THERAPY 1/> REGIONS: CPT | Mod: GP | Performed by: PHYSICAL THERAPIST

## 2018-10-09 PROCEDURE — 97110 THERAPEUTIC EXERCISES: CPT | Mod: GP | Performed by: PHYSICAL THERAPIST

## 2018-10-11 NOTE — PROGRESS NOTES
x_ Orientation            Time          _ -3____        Place        ____2____        Personal Info.     ____4____        Presidents                     5              __WAIS-IV   FSIQ____VCI_____PRI_____ WMI____PSI____     Raw  Age SS  _x_Similarities  __28___              ___12___  _x_Block Design               __29                ___9___  _x_Digit Span  __23 ___  ___9_   _x_Arithmetic  __11 ___                ___8___  _x_Coding  __55___              ___10___    __HVLT - R        Trial    1      2        3      Total   _3_   _5_   _8_    _16_         Raw     Tscore       Immediate Recall _16_  _26_       Delayed Recall __7_  _37_        Retention (%)  _88  _48_       Rec/Dis Ind.  #Hits _11_   #FPs_1_  Tscore _45    __BVMT - R        Trial    1      2        3      Total   _7_   _11_   _10_    _28_         Raw     Tscore       Immediate Recall _28_  61_       Delayed Recall __10_  _58_        Retention (%)  _91_  __>16       Rec/Dis Ind.  #Hits _6_   #FPs_0_  Tscore _>16_    __Rey-Osterreith/Lotus Complex Figure Test    Raw  T-score   %ile      Copy   __28__         -                  2-5      Time  __263 _    __WRAT-4             SS      %ile     GE   Reading          110       75         >12.9    __COWAT   Raw__35___ Tscore__46___   __Semantic Fluency/Animals   Raw = 15     T-score = 44  __BNT   Raw = 59/60 %ile = 100  __Complex Ideational Material   Raw  11/12 T-Score = 44    __Trail Making Test   A =  22         Errors = 0    %ile = 77-81   B =  60        Errors = 0    %ile = 69-85  __Stroop                       Raw        %ile        Word = _66_      __4__        Color = _55_           _8-15__        C/W   = _29_         __11-15 __    __JoLO   Raw = 19 %ile = 13    __Grooved Pegboard   RH = 83          Tscore = 47      Drops = 0   LH =  87          TScore = 42      Drops = 0    __BDI-II   Raw__6__ Interp.__ Minimal ___   __BAI     Raw___14_ Interp. __Mild___  __MMPI-2RF  __[place] Scales of Personality  Change  __Quolie-31    __WMS-III                                           Raw            Age SS        LM I                                31                   9       LM II                               17                  10        LM Recognition           21              z=-0.47    __Dot Counting   E-Score= 13

## 2018-10-15 ENCOUNTER — OFFICE VISIT (OUTPATIENT)
Dept: FAMILY MEDICINE | Facility: CLINIC | Age: 63
End: 2018-10-15
Payer: MEDICARE

## 2018-10-15 VITALS
OXYGEN SATURATION: 100 % | SYSTOLIC BLOOD PRESSURE: 102 MMHG | HEART RATE: 98 BPM | RESPIRATION RATE: 16 BRPM | BODY MASS INDEX: 27.99 KG/M2 | DIASTOLIC BLOOD PRESSURE: 66 MMHG | WEIGHT: 189 LBS | TEMPERATURE: 98.1 F | HEIGHT: 69 IN

## 2018-10-15 DIAGNOSIS — N89.8 VAGINAL ITCHING: Primary | ICD-10-CM

## 2018-10-15 DIAGNOSIS — Z23 NEED FOR PROPHYLACTIC VACCINATION AND INOCULATION AGAINST INFLUENZA: ICD-10-CM

## 2018-10-15 DIAGNOSIS — R82.90 CLOUDY URINE: ICD-10-CM

## 2018-10-15 DIAGNOSIS — B49 FUNGAL INFECTION: ICD-10-CM

## 2018-10-15 LAB
ALBUMIN UR-MCNC: NEGATIVE MG/DL
APPEARANCE UR: CLEAR
BILIRUB UR QL STRIP: NEGATIVE
COLOR UR AUTO: YELLOW
GLUCOSE UR STRIP-MCNC: NEGATIVE MG/DL
HGB UR QL STRIP: NEGATIVE
KETONES UR STRIP-MCNC: NEGATIVE MG/DL
LEUKOCYTE ESTERASE UR QL STRIP: ABNORMAL
NITRATE UR QL: NEGATIVE
PH UR STRIP: 6 PH (ref 5–7)
RBC #/AREA URNS AUTO: NORMAL /HPF
SOURCE: ABNORMAL
SP GR UR STRIP: 1.02 (ref 1–1.03)
SPECIMEN SOURCE: NORMAL
UROBILINOGEN UR STRIP-ACNC: 0.2 EU/DL (ref 0.2–1)
WBC #/AREA URNS AUTO: NORMAL /HPF
WET PREP SPEC: NORMAL

## 2018-10-15 PROCEDURE — 90662 IIV NO PRSV INCREASED AG IM: CPT | Performed by: FAMILY MEDICINE

## 2018-10-15 PROCEDURE — 87210 SMEAR WET MOUNT SALINE/INK: CPT | Performed by: FAMILY MEDICINE

## 2018-10-15 PROCEDURE — 87086 URINE CULTURE/COLONY COUNT: CPT | Performed by: FAMILY MEDICINE

## 2018-10-15 PROCEDURE — 99213 OFFICE O/P EST LOW 20 MIN: CPT | Mod: 25 | Performed by: FAMILY MEDICINE

## 2018-10-15 PROCEDURE — G0008 ADMIN INFLUENZA VIRUS VAC: HCPCS | Performed by: FAMILY MEDICINE

## 2018-10-15 PROCEDURE — 81001 URINALYSIS AUTO W/SCOPE: CPT | Performed by: FAMILY MEDICINE

## 2018-10-15 RX ORDER — KETOCONAZOLE 20 MG/G
CREAM TOPICAL 2 TIMES DAILY
Qty: 30 G | Refills: 1 | Status: SHIPPED | OUTPATIENT
Start: 2018-10-15 | End: 2019-10-17

## 2018-10-15 NOTE — PROGRESS NOTES
SUBJECTIVE:   Ina Otoole is a 63 year old female who presents to clinic today for the following health issues:      URINARY TRACT SYMPTOMS  Onset: 3 weeks ago     Description: Patient has cloudy urine and vaginal discharge Patient was treated in Aug for uti  Painful urination (Dysuria): no   Blood in urine (Hematuria): no   Delay in urine (Hesitency): no     Intensity: mild    Progression of Symptoms:  same    Accompanying Signs & Symptoms:  Fever/chills: no   Flank pain no   Nausea and vomiting: no   Any vaginal symptoms: vaginal itching  Abdominal/Pelvic Pain: no     History:   History of frequent UTI's: YES  History of kidney stones: no   Sexually Active: no   Possibility of pregnancy: No    Precipitating factors:   Patient had knee surgery and wondering if it could of been the medication antibiotic     Therapies Tried and outcome: Increase fluid intake  Vaginal Symptoms  Onset: 3 Weeks ago     Description:  Vaginal Discharge: clear   Itching (Pruritis): YES  Burning sensation:  YES  Odor: YES    Accompanying Signs & Symptoms:  Pain with Urination: no   Abdominal Pain: no   Fever: no     History:   Sexually active: no   New Partner: no   Possibility of Pregnancy:  No    Precipitating factors:   Recent Antibiotic Use: YES- knee surgery 8-20-18    Alleviating factors:  None     Therapies Tried and outcome: none     History as above, here for vaginal symptoms. Patient reports that she has some itching and irritation in the vaginal area. She also noticed cloudy urine. There is some burning, denies any flank pain or lower abdominal pain     Problem list and histories reviewed & adjusted, as indicated.  Additional history: as documented    Patient Active Problem List   Diagnosis     Esophageal reflux     Myalgia and myositis     Goiter     Pain in joint, lower leg     OA (osteoarthritis) of knee     CARDIOVASCULAR SCREENING; LDL GOAL LESS THAN 160     Calculus of gallbladder with other cholecystitis, without  mention of obstruction     Renal calculus or stone     IBS (irritable bowel syndrome)     Advanced directives, counseling/discussion     Preop general physical exam     Thrombophlebitis leg     Past Surgical History:   Procedure Laterality Date     ARTHROSCOPY KNEE RT/LT      left - meniscus repair     COLONOSCOPY N/A 8/24/2017    Procedure: COLONOSCOPY;  Colonoscopy  ;  Surgeon: Abhishek Escobedo MD;  Location: [facility]     KNEE SURGERY  9/2013    left knee replacement     LAPAROSCOPIC CHOLECYSTECTOMY  6/29/2011    Procedure:LAPAROSCOPIC CHOLECYSTECTOMY; Surgeon:SUMAYA ZAMORANO; Location:[facility]     STONE ANALYSIS  2011     THYROID BIOPSY         Social History   Substance Use Topics     Smoking status: Former Smoker     Quit date: 2/22/2000     Smokeless tobacco: Never Used     Alcohol use Yes      Comment: only for special occassions     Family History   Problem Relation Age of Onset     Diabetes Mother      Hypertension Mother      Cerebrovascular Disease Mother      Cancer Mother      melanoma     Respiratory Father      copd     C.A.D. Father      Cancer - colorectal Father      Breast Cancer Maternal Grandmother      Diabetes Paternal Grandmother      Hypertension Brother      Hypertension Sister      Diabetes Sister      Neurologic Disorder Sister      ms     Other - See Comments Daughter      Transverse Myelitis     Asthma No family hx of      Prostate Cancer No family hx of          Current Outpatient Prescriptions   Medication Sig Dispense Refill     ACETAMINOPHEN PO Take 1,000 mg by mouth 2 times daily       ASPIRIN EC PO Take 325 mg by mouth 2 times daily       Ibuprofen (ADVIL PO) Take 400 mg by mouth 2 times daily        ketoconazole (NIZORAL) 2 % cream Apply topically 2 times daily 30 g 1     polyethylene glycol (MIRALAX/GLYCOLAX) Packet Take 17 g by mouth daily       ranitidine (ZANTAC) 150 MG tablet Take 1 tablet (150 mg) by mouth 2 times daily (Needs follow-up appointment for this medication)  "60 tablet 0     senna-docusate (SENOKOT-S;PERICOLACE) 8.6-50 MG per tablet Take 2 tablets by mouth 2 times daily        magnesium hydroxide (MILK OF MAGNESIA) 400 MG/5ML suspension Take 30 mLs by mouth daily as needed for constipation or heartburn       Allergies   Allergen Reactions     Sulfa Drugs Hives     BP Readings from Last 3 Encounters:   10/15/18 102/66   08/28/18 160/79   08/24/18 102/67    Wt Readings from Last 3 Encounters:   10/15/18 189 lb (85.7 kg)   08/28/18 188 lb 6.4 oz (85.5 kg)   08/24/18 188 lb 6.4 oz (85.5 kg)                  Labs reviewed in EPIC    Reviewed and updated as needed this visit by clinical staff  Allergies       Reviewed and updated as needed this visit by Provider         ROS:  Constitutional, HEENT, cardiovascular, pulmonary, gi and gu systems are negative, except as otherwise noted.    OBJECTIVE:     /66  Pulse 98  Temp 98.1  F (36.7  C) (Tympanic)  Resp 16  Ht 5' 9\" (1.753 m)  Wt 189 lb (85.7 kg)  SpO2 100%  BMI 27.91 kg/m2  Body mass index is 27.91 kg/(m^2).  GENERAL: healthy, alert and no distress  NECK: no adenopathy, no asymmetry, masses, or scars and thyroid normal to palpation  RESP: lungs clear to auscultation - no rales, rhonchi or wheezes  CV: regular rate and rhythm, normal S1 S2, no S3 or S4, no murmur, click or rub, no peripheral edema and peripheral pulses strong  ABDOMEN: soft, nontender, no hepatosplenomegaly, no masses and bowel sounds normal  MS: no gross musculoskeletal defects noted, no edema  BACK: no CVA tenderness, no paralumbar tenderness  Skin - rash underneath left breast circular , clear center.appears fungal    Diagnostic Test Results:  Results for orders placed or performed in visit on 10/15/18 (from the past 24 hour(s))   UA reflex to Microscopic and Culture   Result Value Ref Range    Color Urine Yellow     Appearance Urine Clear     Glucose Urine Negative NEG^Negative mg/dL    Bilirubin Urine Negative NEG^Negative    Ketones Urine " Negative NEG^Negative mg/dL    Specific Gravity Urine 1.020 1.003 - 1.035    Blood Urine Negative NEG^Negative    pH Urine 6.0 5.0 - 7.0 pH    Protein Albumin Urine Negative NEG^Negative mg/dL    Urobilinogen Urine 0.2 0.2 - 1.0 EU/dL    Nitrite Urine Negative NEG^Negative    Leukocyte Esterase Urine Trace (A) NEG^Negative    Source Midstream Urine    Wet prep   Result Value Ref Range    Specimen Description Vagina     Wet Prep No yeast seen     Wet Prep No clue cells seen     Wet Prep No Trichomonas seen    Urine Microscopic   Result Value Ref Range    WBC Urine 0 - 5 OTO5^0 - 5 /HPF    RBC Urine O - 2 OTO2^O - 2 /HPF       ASSESSMENT/PLAN:         (N89.8) Vaginal itching  (primary encounter diagnosis)  Comment: wet prep was negative asked to try refresh   Plan: UA reflex to Microscopic and Culture, Wet prep,        Urine Microscopic            (R82.90) Cloudy urine  Comment: Urine was borderline. Will culture and inform patient   Plan: Urine Culture Aerobic Bacterial            (Z23) Need for prophylactic vaccination and inoculation against influenza  Comment: Flu shot updated   Plan: FLU VACCINE, INCREASED ANTIGEN, PRESV FREE, AGE        65+ [43677], Vaccine Administration, Initial         [58519]            (B49) Fungal infection  Comment: Skin infection underneath the breast   Plan: ketoconazole (NIZORAL) 2 % cream              FUTURE APPOINTMENTS:       - Follow-up visit as needed  Patient Instructions         Thank you for choosing [facility].  You may be receiving a survey in the mail from [employer] regarding your visit today.  Please take a few minutes to complete and return the survey to let us know how we are doing.      If you have questions or concerns, please contact us via [employer] or you can contact your care team at [phone].    Our Clinic hours are:  Monday 6:40 am  to 7:00 pm  Tuesday -Friday 6:40 am to 5:00 pm    The [place] outpatient lab hours are:  Monday - Friday 6:10 am to 4:45  pm  Saturdays 7:00 am to 11:00 am  Appointments are required, call [phone]    If you have clinical questions after hours or would like to schedule an appointment,  call the clinic at [phone].  Atrophic Vaginitis    Atrophic vaginitis means the walls of your vagina have become thin. This happens when your body makes too little of the hormone estrogen. Menopause or surgical removal of the ovaries are the most common causes for a drop in estrogen. Breastfeeding can also cause the hormone level to drop.  Symptoms of atrophic vaginitis include:    Dryness, soreness, burning, or itching in the vagina    Vaginal discharge  Sex can be uncomfortable, even painful. After sex, you may have bleeding from your vagina. You may also have burning or pain when you urinate.  Home care  Your healthcare provider may recommend 1 or more of these as treatment:    Vaginal creams, lotions, and lubricants. These products help relieve vaginal dryness. They don t need a prescription. They can be found in the personal care section of most pharmacies. Creams and lotions are used daily to help keep the vagina moist. Lubricants help reduce dryness and pain during sex. Choose water-based lubricants. Don t use petroleum jelly, mineral oil, or other oils. These increase the chance of infection.     Hormone therapy (HT). HT increases the amount of estrogen in your body. This can help manage or relieve symptoms. HT can be given in pill form. It may be given as a lotion, cream, ring put into the vagina, or a patch on the skin. The risks and benefits of HT vary for each woman. For instance, your risk may be higher if you have had breast cancer. Discuss this treatment with your healthcare provider. Not every woman can use HT.  You don t need to give up (abstain from) sex. In fact, regular sex can help keep vaginal tissues healthy. Take steps to make sex more comfortable by using water-based lubricants.  Preventing infections  Atrophic vaginitis  makes an infection of the vagina or the urinary tract more likely. To help reduce your risk:    Keep your genitals clean. When you bathe, wash the outside of your vagina with mild soap and water. Clean gently between the folds of your vagina.    Wipe from front to back after a bowel movement.    Don t douche unless your healthcare provider tells you to.    Avoid scented toilet paper, scented vaginal sprays, and scented tampons.    Avoid wearing clothes that are tight in the crotch. These include pantyhose, jeans, and leggings. Wear cotton underwear. Change it every day.  Follow-up care  Follow up with your healthcare provider, or as advised.  When to seek medical advice  Call your health care provider right away if any of these occur:    Fever of 100.4 F (38 C) or higher, or as directed by your healthcare provider    Symptoms don t go away or get worse even with treatment    Vaginal area swells or becomes painful    Vaginal area bleeds, but not because of your period    Bad-smelling discharge from the vagina    Pain or burning when you urinate or you have trouble passing urine    Open sores develop around vagina   Date Last Reviewed: 12/1/2016 2000-2017 [employer]. [address] [place] [address], [place] [address]. All rights reserved. This information is not intended as a substitute for professional medical care. Always follow your healthcare professional's instructions.            Verito Ashby MD  [facility]

## 2018-10-15 NOTE — PATIENT INSTRUCTIONS
Thank you for choosing [facility].  You may be receiving a survey in the mail from [employer] regarding your visit today.  Please take a few minutes to complete and return the survey to let us know how we are doing.      If you have questions or concerns, please contact us via [employer] or you can contact your care team at [phone].    Our Clinic hours are:  Monday 6:40 am  to 7:00 pm  Tuesday -Friday 6:40 am to 5:00 pm    The [place] outpatient lab hours are:  Monday - Friday 6:10 am to 4:45 pm  Saturdays 7:00 am to 11:00 am  Appointments are required, call [phone]    If you have clinical questions after hours or would like to schedule an appointment,  call the clinic at [phone].  Atrophic Vaginitis    Atrophic vaginitis means the walls of your vagina have become thin. This happens when your body makes too little of the hormone estrogen. Menopause or surgical removal of the ovaries are the most common causes for a drop in estrogen. Breastfeeding can also cause the hormone level to drop.  Symptoms of atrophic vaginitis include:    Dryness, soreness, burning, or itching in the vagina    Vaginal discharge  Sex can be uncomfortable, even painful. After sex, you may have bleeding from your vagina. You may also have burning or pain when you urinate.  Home care  Your healthcare provider may recommend 1 or more of these as treatment:    Vaginal creams, lotions, and lubricants. These products help relieve vaginal dryness. They don t need a prescription. They can be found in the personal care section of most pharmacies. Creams and lotions are used daily to help keep the vagina moist. Lubricants help reduce dryness and pain during sex. Choose water-based lubricants. Don t use petroleum jelly, mineral oil, or other oils. These increase the chance of infection.     Hormone therapy (HT). HT increases the amount of estrogen in your body. This can help manage or relieve symptoms. HT can be given in pill  form. It may be given as a lotion, cream, ring put into the vagina, or a patch on the skin. The risks and benefits of HT vary for each woman. For instance, your risk may be higher if you have had breast cancer. Discuss this treatment with your healthcare provider. Not every woman can use HT.  You don t need to give up (abstain from) sex. In fact, regular sex can help keep vaginal tissues healthy. Take steps to make sex more comfortable by using water-based lubricants.  Preventing infections  Atrophic vaginitis makes an infection of the vagina or the urinary tract more likely. To help reduce your risk:    Keep your genitals clean. When you bathe, wash the outside of your vagina with mild soap and water. Clean gently between the folds of your vagina.    Wipe from front to back after a bowel movement.    Don t douche unless your healthcare provider tells you to.    Avoid scented toilet paper, scented vaginal sprays, and scented tampons.    Avoid wearing clothes that are tight in the crotch. These include pantyhose, jeans, and leggings. Wear cotton underwear. Change it every day.  Follow-up care  Follow up with your healthcare provider, or as advised.  When to seek medical advice  Call your health care provider right away if any of these occur:    Fever of 100.4 F (38 C) or higher, or as directed by your healthcare provider    Symptoms don t go away or get worse even with treatment    Vaginal area swells or becomes painful    Vaginal area bleeds, but not because of your period    Bad-smelling discharge from the vagina    Pain or burning when you urinate or you have trouble passing urine    Open sores develop around vagina   Date Last Reviewed: 12/1/2016 2000-2017 [employer]. [address] [place] [address], [place] [address]. All rights reserved. This information is not intended as a substitute for professional medical care. Always follow your healthcare professional's instructions.

## 2018-10-15 NOTE — MR AVS SNAPSHOT
After Visit Summary   10/15/2018    Ina Otoole    MRN: [id number]           Patient Information     Date Of Birth          1955        Visit Information        Provider Department      10/15/2018 11:00 AM Verito Ashby MD [facility]        Today's Diagnoses     Vaginal itching    -  1    Cloudy urine        Need for prophylactic vaccination and inoculation against influenza        Fungal infection          Care Instructions          Thank you for choosing [facility].  You may be receiving a survey in the mail from [facility] regarding your visit today.  Please take a few minutes to complete and return the survey to let us know how we are doing.      If you have questions or concerns, please contact us via CloudWalk or you can contact your care team at [phone].    Our Clinic hours are:  Monday 6:40 am  to 7:00 pm  Tuesday -Friday 6:40 am to 5:00 pm    The [place] outpatient lab hours are:  Monday - Friday 6:10 am to 4:45 pm  Saturdays 7:00 am to 11:00 am  Appointments are required, call [phone]    If you have clinical questions after hours or would like to schedule an appointment,  call the clinic at [phone].  Atrophic Vaginitis    Atrophic vaginitis means the walls of your vagina have become thin. This happens when your body makes too little of the hormone estrogen. Menopause or surgical removal of the ovaries are the most common causes for a drop in estrogen. Breastfeeding can also cause the hormone level to drop.  Symptoms of atrophic vaginitis include:    Dryness, soreness, burning, or itching in the vagina    Vaginal discharge  Sex can be uncomfortable, even painful. After sex, you may have bleeding from your vagina. You may also have burning or pain when you urinate.  Home care  Your healthcare provider may recommend 1 or more of these as treatment:    Vaginal creams, lotions, and lubricants. These products help relieve vaginal dryness. They  don t need a prescription. They can be found in the personal care section of most pharmacies. Creams and lotions are used daily to help keep the vagina moist. Lubricants help reduce dryness and pain during sex. Choose water-based lubricants. Don t use petroleum jelly, mineral oil, or other oils. These increase the chance of infection.     Hormone therapy (HT). HT increases the amount of estrogen in your body. This can help manage or relieve symptoms. HT can be given in pill form. It may be given as a lotion, cream, ring put into the vagina, or a patch on the skin. The risks and benefits of HT vary for each woman. For instance, your risk may be higher if you have had breast cancer. Discuss this treatment with your healthcare provider. Not every woman can use HT.  You don t need to give up (abstain from) sex. In fact, regular sex can help keep vaginal tissues healthy. Take steps to make sex more comfortable by using water-based lubricants.  Preventing infections  Atrophic vaginitis makes an infection of the vagina or the urinary tract more likely. To help reduce your risk:    Keep your genitals clean. When you bathe, wash the outside of your vagina with mild soap and water. Clean gently between the folds of your vagina.    Wipe from front to back after a bowel movement.    Don t douche unless your healthcare provider tells you to.    Avoid scented toilet paper, scented vaginal sprays, and scented tampons.    Avoid wearing clothes that are tight in the crotch. These include pantyhose, jeans, and leggings. Wear cotton underwear. Change it every day.  Follow-up care  Follow up with your healthcare provider, or as advised.  When to seek medical advice  Call your health care provider right away if any of these occur:    Fever of 100.4 F (38 C) or higher, or as directed by your healthcare provider    Symptoms don t go away or get worse even with treatment    Vaginal area swells or becomes painful    Vaginal area bleeds, but  "not because of your period    Bad-smelling discharge from the vagina    Pain or burning when you urinate or you have trouble passing urine    Open sores develop around vagina   Date Last Reviewed: 12/1/2016 2000-2017 [employer]. [address] [place] [address]. All rights reserved. This information is not intended as a substitute for professional medical care. Always follow your healthcare professional's instructions.                Follow-ups after your visit        Your next 10 appointments already scheduled     Oct 16, 2018 10:00 AM CDT   Ortho Treatment with Leeanna Jiang PT   [facility] ([facility])    [address] [facility]  [address]  [facility] [place] [address] [phone]              Who to contact     If you have questions or need follow up information about today's clinic visit or your schedule please contact [facility] directly at [phone].  Normal or non-critical lab and imaging results will be communicated to you by MyChart, letter or phone within 4 business days after the clinic has received the results. If you do not hear from us within 7 days, please contact the clinic through MyChart or phone. If you have a critical or abnormal lab result, we will notify you by phone as soon as possible.  Submit refill requests through [employer] or call your pharmacy and they will forward the refill request to us. Please allow 3 business days for your refill to be completed.          Additional Information About Your Visit        Care EveryWhere ID     This is your Care EveryWhere ID. This could be used by other organizations to access your [facility] medical records  [id number]        Your Vitals Were     Pulse Temperature Respirations Height Pulse Oximetry BMI (Body Mass Index)    98 98.1  F (36.7  C) (Tympanic) 16 5' 9\" (1.753 m) 100% 27.91 kg/m2       Blood Pressure from Last 3 Encounters:   10/15/18 102/66   08/28/18 160/79   08/24/18 " 102/67    Weight from Last 3 Encounters:   10/15/18 189 lb (85.7 kg)   08/28/18 188 lb 6.4 oz (85.5 kg)   08/24/18 188 lb 6.4 oz (85.5 kg)              We Performed the Following     FLU VACCINE, INCREASED ANTIGEN, PRESV FREE, AGE 65+ [02257]     UA reflex to Microscopic and Culture     Urine Culture Aerobic Bacterial     Urine Microscopic     Vaccine Administration, Initial [[id number]]     Wet prep          Today's Medication Changes          These changes are accurate as of 10/15/18 11:37 AM.  If you have any questions, ask your nurse or doctor.               Start taking these medicines.        Dose/Directions    ketoconazole 2 % cream   Commonly known as:  NIZORAL   Used for:  Fungal infection   Started by:  Verito Ashby MD        Apply topically 2 times daily   Quantity:  30 g   Refills:  1         Stop taking these medicines if you haven't already. Please contact your care team if you have questions.     OXYCODONE HCL PO   Stopped by:  Verito Ashby MD                Where to get your medicines      These medications were sent to [facility] [facility]  [address]   [address] [facility] [address]     Phone:  [phone]     ketoconazole 2 % cream                Primary Care Provider Office Phone # Fax #    Verito Ashby [phone] [address] [facility] [address]        Equal Access to Services     [facility] Center[facility] AH: Hadii annette Ty, waaxda lurosalieadaha, qaybta kaalmada francis, ronald mays. So [facility] [phone].    ATENCIÓN: Si habla español, tiene a shipman disposición servicios gratuitos de asistencia lingüística. Llame al [phone].    We comply with applicable federal civil rights laws and [place] laws. We do not discriminate on the basis of race, color, national origin, age, disability, sex, sexual orientation, or gender identity.            Thank you!     Thank you for  choosing [facility]  for your care. Our goal is always to provide you with excellent care. Hearing back from our patients is one way we can continue to improve our services. Please take a few minutes to complete the written survey that you may receive in the mail after your visit with us. Thank you!             Your Updated Medication List - Protect others around you: Learn how to safely use, store and throw away your medicines at www.disposemymeds.org.          This list is accurate as of 10/15/18 11:37 AM.  Always use your most recent med list.                   Brand Name Dispense Instructions for use Diagnosis    ACETAMINOPHEN PO      Take 1,000 mg by mouth 2 times daily        ADVIL PO      Take 400 mg by mouth 2 times daily        ASPIRIN EC PO      Take 325 mg by mouth 2 times daily        ketoconazole 2 % cream    NIZORAL    30 g    Apply topically 2 times daily    Fungal infection       magnesium hydroxide 400 MG/5ML suspension    MILK OF MAGNESIA     Take 30 mLs by mouth daily as needed for constipation or heartburn        polyethylene glycol Packet    MIRALAX/GLYCOLAX     Take 17 g by mouth daily        ranitidine 150 MG tablet    ZANTAC    60 tablet    Take 1 tablet (150 mg) by mouth 2 times daily (Needs follow-up appointment for this medication)    Esophageal reflux       senna-docusate 8.6-50 MG per tablet    SENOKOT-S;PERICOLACE     Take 2 tablets by mouth 2 times daily

## 2018-10-15 NOTE — PROGRESS NOTES

## 2018-10-16 ENCOUNTER — HOSPITAL ENCOUNTER (OUTPATIENT)
Dept: PHYSICAL THERAPY | Facility: CLINIC | Age: 63
Setting detail: THERAPIES SERIES
End: 2018-10-16
Attending: FAMILY MEDICINE
Payer: MEDICARE

## 2018-10-16 LAB
BACTERIA SPEC CULT: NO GROWTH
Lab: NORMAL
SPECIMEN SOURCE: NORMAL

## 2018-10-16 PROCEDURE — 97140 MANUAL THERAPY 1/> REGIONS: CPT | Mod: GP | Performed by: PHYSICAL THERAPIST

## 2018-10-16 PROCEDURE — 40000718 ZZHC STATISTIC PT DEPARTMENT ORTHO VISIT: Performed by: PHYSICAL THERAPIST

## 2018-10-16 PROCEDURE — 97110 THERAPEUTIC EXERCISES: CPT | Mod: GP | Performed by: PHYSICAL THERAPIST

## 2018-10-16 NOTE — ADDENDUM NOTE
Encounter addended by: Leeanna Jiang, PT on: 10/16/2018 10:46 AM<BR>     Actions taken: Flowsheet accepted

## 2018-11-01 ENCOUNTER — HOSPITAL ENCOUNTER (OUTPATIENT)
Dept: PHYSICAL THERAPY | Facility: CLINIC | Age: 63
Setting detail: THERAPIES SERIES
End: 2018-11-01
Attending: FAMILY MEDICINE
Payer: MEDICARE

## 2018-11-01 PROCEDURE — G8980 MOBILITY D/C STATUS: HCPCS | Mod: GP,CI | Performed by: PHYSICAL THERAPIST

## 2018-11-01 PROCEDURE — G8979 MOBILITY GOAL STATUS: HCPCS | Mod: GP,CI | Performed by: PHYSICAL THERAPIST

## 2018-11-01 PROCEDURE — 97140 MANUAL THERAPY 1/> REGIONS: CPT | Mod: GP | Performed by: PHYSICAL THERAPIST

## 2018-11-01 PROCEDURE — 97110 THERAPEUTIC EXERCISES: CPT | Mod: GP | Performed by: PHYSICAL THERAPIST

## 2018-11-01 PROCEDURE — 40000718 ZZHC STATISTIC PT DEPARTMENT ORTHO VISIT: Performed by: PHYSICAL THERAPIST

## 2018-11-01 NOTE — PROGRESS NOTES
"Outpatient Physical Therapy Discharge Note     Patient: Ina Otoole  : 1955    Beginning/End Dates of Reporting Period:  18 to 2018    Referring Provider: Verito Ashby MD    Therapy Diagnosis: Right TKA     Client Self Report: The knee is doing really well.  Feels ready to be done with PT.      Objective Measurements:  Objective Measure: Right knee ROM  Details: 0-130 degrees  Objective Measure: Step up height  Details: 8\"  Objective Measure: LEFS  Details: 26.25 % disability (48% improvement from initial evaluation)         Goals:  Goal Identifier     Goal Description Patient will have 0-125 degrees of right knee ROM to allow for normalized gait.   Target Date 10/11/18   Date Met  18   Progress:     Goal Identifier     Goal Description Patient will be able to perform tandem stance for >=30 seconds.   Target Date 18   Date Met  18   Progress:     Goal Identifier     Goal Description Patient will be independent with her HEP to reduce future occurrence of pain and disability.   Target Date 10/11/18   Date Met  10/09/18   Progress:       Progress Toward Goals:   Progress this reporting period: Patient has made excellent progress with pain reduction, ROM, and gait.  She is independent with her long term HEP.      Plan:  Discharge from therapy.    Discharge:    Reason for Discharge: Patient has met all goals.    Equipment Issued: Theraband    Discharge Plan: Patient to continue home program.    Leeanna Jiang PT, DPT  Doctor of Physical Therapy #[id number]  [facility]  [phone]  [email or web address]    "

## 2018-12-23 ENCOUNTER — NURSE TRIAGE (OUTPATIENT)
Dept: NURSING | Facility: CLINIC | Age: 63
End: 2018-12-23

## 2018-12-24 NOTE — TELEPHONE ENCOUNTER
Patient has been sick with cold like symptoms for 2 weeks and has had ranging symptoms from laryngitis, sneezing, runny nose, and in the past day has developed a sever cough. She has also been producing a lot of mucus that is a vincent yellow color. Denies breathing problems, fever, coughing up blood, or chest pain.     Protocol and care advice reviewed.  Advised to be seen within 24 hours, transferred patient to the  to try to get an appt for tomorrow morning.   Caller states understanding of the recommended disposition.   Advised to call back if further questions or concerns.    Reason for Disposition    SEVERE coughing spells (e.g., whooping sound after coughing, vomiting after coughing)    Additional Information    Negative: Severe difficulty breathing (e.g., struggling for each breath, speaks in single words)    Negative: Bluish lips, tongue, or face now    Negative: [1] Difficulty breathing AND [2] exposure to flames, smoke, or fumes    Negative: [1] Stridor AND [2] difficulty breathing    Negative: Sounds like a life-threatening emergency to the triager    Negative: Chest pain  (Exception: MILD central chest pain, present only when coughing)    Negative: Difficulty breathing    Negative: Patient sounds very sick or weak to the triager    Negative: [1] Coughed up blood AND [2] > 1 tablespoon (15 ml) (Exception: blood-tinged sputum)    Negative: Fever > 103 F (39.4 C)    Negative: [1] Fever > 101 F (38.3 C) AND [2] age > 60    Negative: [1] Fever > 101 F (38.3 C) AND [2] bedridden (e.g., nursing home patient, CVA, chronic illness, recovering from surgery)    Negative: [1] Fever > 100.5 F (38.1 C) AND [2] diabetes mellitus or weak immune system (e.g., HIV positive, cancer chemo, splenectomy, organ transplant, chronic steroids)    Negative: Wheezing is present    Protocols used: COUGH - ACUTE PRODUCTIVE-ADULT-AH

## 2019-02-12 ENCOUNTER — TRANSFERRED RECORDS (OUTPATIENT)
Dept: HEALTH INFORMATION MANAGEMENT | Facility: CLINIC | Age: 64
End: 2019-02-12

## 2019-08-27 ENCOUNTER — TRANSFERRED RECORDS (OUTPATIENT)
Dept: HEALTH INFORMATION MANAGEMENT | Facility: CLINIC | Age: 64
End: 2019-08-27

## 2019-09-11 ENCOUNTER — IMMUNIZATION (OUTPATIENT)
Dept: FAMILY MEDICINE | Facility: CLINIC | Age: 64
End: 2019-09-11
Payer: MEDICARE

## 2019-09-11 DIAGNOSIS — Z23 NEED FOR PROPHYLACTIC VACCINATION AND INOCULATION AGAINST INFLUENZA: Primary | ICD-10-CM

## 2019-09-11 PROCEDURE — 99207 ZZC NO CHARGE NURSE ONLY: CPT

## 2019-09-11 PROCEDURE — 90662 IIV NO PRSV INCREASED AG IM: CPT

## 2019-09-11 PROCEDURE — G0008 ADMIN INFLUENZA VIRUS VAC: HCPCS

## 2019-10-17 ENCOUNTER — OFFICE VISIT (OUTPATIENT)
Dept: FAMILY MEDICINE | Facility: CLINIC | Age: 64
End: 2019-10-17
Payer: MEDICARE

## 2019-10-17 VITALS
SYSTOLIC BLOOD PRESSURE: 116 MMHG | WEIGHT: 211.6 LBS | BODY MASS INDEX: 29.62 KG/M2 | HEART RATE: 79 BPM | TEMPERATURE: 97.4 F | HEIGHT: 71 IN | RESPIRATION RATE: 16 BRPM | DIASTOLIC BLOOD PRESSURE: 72 MMHG | OXYGEN SATURATION: 98 %

## 2019-10-17 DIAGNOSIS — N89.8 VAGINAL ODOR: ICD-10-CM

## 2019-10-17 DIAGNOSIS — R82.90 NONSPECIFIC FINDING ON EXAMINATION OF URINE: ICD-10-CM

## 2019-10-17 DIAGNOSIS — R39.9 UTI SYMPTOMS: Primary | ICD-10-CM

## 2019-10-17 LAB
ALBUMIN UR-MCNC: NEGATIVE MG/DL
APPEARANCE UR: ABNORMAL
BACTERIA #/AREA URNS HPF: ABNORMAL /HPF
BILIRUB UR QL STRIP: NEGATIVE
COLOR UR AUTO: YELLOW
GLUCOSE UR STRIP-MCNC: NEGATIVE MG/DL
HGB UR QL STRIP: ABNORMAL
KETONES UR STRIP-MCNC: NEGATIVE MG/DL
LEUKOCYTE ESTERASE UR QL STRIP: ABNORMAL
MUCOUS THREADS #/AREA URNS LPF: PRESENT /LPF
NITRATE UR QL: NEGATIVE
NON-SQ EPI CELLS #/AREA URNS LPF: ABNORMAL /LPF
PH UR STRIP: 5 PH (ref 5–7)
RBC #/AREA URNS AUTO: ABNORMAL /HPF
SOURCE: ABNORMAL
SP GR UR STRIP: 1.03 (ref 1–1.03)
SPECIMEN SOURCE: ABNORMAL
URATE CRY #/AREA URNS HPF: ABNORMAL /HPF
UROBILINOGEN UR STRIP-ACNC: 0.2 EU/DL (ref 0.2–1)
WBC #/AREA URNS AUTO: >100 /HPF
WET PREP SPEC: ABNORMAL

## 2019-10-17 PROCEDURE — 87086 URINE CULTURE/COLONY COUNT: CPT | Performed by: FAMILY MEDICINE

## 2019-10-17 PROCEDURE — 81001 URINALYSIS AUTO W/SCOPE: CPT | Performed by: FAMILY MEDICINE

## 2019-10-17 PROCEDURE — 87210 SMEAR WET MOUNT SALINE/INK: CPT | Performed by: FAMILY MEDICINE

## 2019-10-17 PROCEDURE — 99213 OFFICE O/P EST LOW 20 MIN: CPT | Performed by: FAMILY MEDICINE

## 2019-10-17 RX ORDER — CIPROFLOXACIN 500 MG/1
500 TABLET, FILM COATED ORAL 2 TIMES DAILY
Qty: 14 TABLET | Refills: 0 | Status: SHIPPED | OUTPATIENT
Start: 2019-10-17 | End: 2020-01-27

## 2019-10-17 ASSESSMENT — MIFFLIN-ST. JEOR: SCORE: 1588

## 2019-10-17 NOTE — PROGRESS NOTES
Subjective     Ina Otoole is a 64 year old female who presents to clinic today for the following health issues:    HPI   URINARY TRACT SYMPTOMS  Onset: x 1 year     Description:   Painful urination (Dysuria): YES- sometimes           Frequency: no   Blood in urine (Hematuria): no   Delay in urine (Hesitency): no     Intensity: 0/10    Progression of Symptoms:  worsening    Accompanying Signs & Symptoms:  Fever/chills: no   Flank pain no   Nausea and vomiting: no   Any vaginal symptoms: vaginal discharge, vaginal odor and vaginal itching  Abdominal/Pelvic Pain: no     History:   History of frequent UTI's: no   History of kidney stones: YES  Sexually Active: no   Possibility of pregnancy: No    Precipitating factors:   nothing    Therapies Tried and outcome: Cranberry tablets, apple cider vinegar- doesn't seem to help.        Current Outpatient Medications:      ASPIRIN EC PO, Take 325 mg by mouth 2 times daily, Disp: , Rfl:      Ibuprofen (ADVIL PO), Take 400 mg by mouth 2 times daily , Disp: , Rfl:      ranitidine (ZANTAC) 150 MG tablet, Take 1 tablet (150 mg) by mouth 2 times daily (Needs follow-up appointment for this medication), Disp: 60 tablet, Rfl: 0     senna-docusate (SENOKOT-S;PERICOLACE) 8.6-50 MG per tablet, Take 2 tablets by mouth 2 times daily , Disp: , Rfl:      ACETAMINOPHEN PO, Take 1,000 mg by mouth 2 times daily, Disp: , Rfl:     Patient Active Problem List   Diagnosis     Esophageal reflux     Myalgia and myositis     Goiter     Pain in joint, lower leg     OA (osteoarthritis) of knee     CARDIOVASCULAR SCREENING; LDL GOAL LESS THAN 160     Calculus of gallbladder with other cholecystitis, without mention of obstruction     Renal calculus or stone     IBS (irritable bowel syndrome)     Advanced directives, counseling/discussion     Preop general physical exam     Thrombophlebitis leg       Blood pressure 116/72, pulse 79, temperature 97.4  F (36.3  C), temperature source Tympanic, resp. rate  "16, height 1.791 m (5' 10.5\"), weight 96 kg (211 lb 9.6 oz), SpO2 98 %, not currently breastfeeding.    Exam:  GENERAL APPEARANCE: healthy, alert and no distress  ABDOMEN:  soft, nontender, no HSM or masses and bowel sounds normal; no cva tenderness  SKIN: no suspicious lesions or rashes  PSYCH: mentation appears normal and affect normal/bright    UA shows a likely UTI; UC is ordered.       (R39.9) UTI symptoms  (primary encounter diagnosis)  Comment:   Plan: *UA reflex to Microscopic and Culture ([place]         and [facility] (except [place] and         [place]), Urine Microscopic, ciprofloxacin         (CIPRO) 500 MG tablet        It appears as a urine infection on the testing and start Cipro at 500 mg twice daily for 7 days.   Call if any side effects. The culture and sensitivities will be done Saturday. If not better then call [phone].   Stay well hydrated. Prevention discussed. Follow up with Dr. Ashby as needed.     Ryne Neal MD        "

## 2019-10-17 NOTE — PATIENT INSTRUCTIONS
(R39.9) UTI symptoms  (primary encounter diagnosis)  Comment:   Plan: *UA reflex to Microscopic and Culture ([place]         and [facility]] (except [place] and         [place]), Urine Microscopic, ciprofloxacin         (CIPRO) 500 MG tablet        It appears as a urine infection on the testing and start Cipro at 500 mg twice daily for 7 days.   Call if any side effects. The culture and sensitivities will be done Saturday. If not better then call [phone].   Stay well hydrated. Prevention discussed. Follow up with Dr. Ashby as needed.

## 2019-10-18 ENCOUNTER — TELEPHONE (OUTPATIENT)
Dept: FAMILY MEDICINE | Facility: CLINIC | Age: 64
End: 2019-10-18

## 2019-10-18 LAB
BACTERIA SPEC CULT: NORMAL
Lab: NORMAL
SPECIMEN SOURCE: NORMAL

## 2019-10-18 NOTE — TELEPHONE ENCOUNTER
Culture is still pending.   Patient advised to continue antibiotic until final result comes in.   Patient verbalizes understanding, agrees to plan of care, and has no further questions.

## 2019-10-18 NOTE — TELEPHONE ENCOUNTER
Reason for Call:  Lab results    Detailed comments: pt is calling and had labs taken and given Cipro.  She is wondering if she should continue with that as she has just heard of the prilimiary results only  Please advise.    Phone Number Patient can be reached at: Home number on file [phone] (home)    Best Time: any    Can we leave a detailed message on this number? YES    Call taken on 10/18/2019 at 12:40 PM by Melyssa Brooks

## 2019-12-16 ENCOUNTER — HEALTH MAINTENANCE LETTER (OUTPATIENT)
Age: 64
End: 2019-12-16

## 2020-01-27 ENCOUNTER — OFFICE VISIT (OUTPATIENT)
Dept: FAMILY MEDICINE | Facility: CLINIC | Age: 65
End: 2020-01-27
Payer: MEDICARE

## 2020-01-27 VITALS
OXYGEN SATURATION: 98 % | BODY MASS INDEX: 29.35 KG/M2 | HEIGHT: 70 IN | RESPIRATION RATE: 18 BRPM | TEMPERATURE: 98.1 F | SYSTOLIC BLOOD PRESSURE: 124 MMHG | HEART RATE: 75 BPM | DIASTOLIC BLOOD PRESSURE: 82 MMHG | WEIGHT: 205 LBS

## 2020-01-27 DIAGNOSIS — N76.6 VULVAR ULCER: Primary | ICD-10-CM

## 2020-01-27 DIAGNOSIS — I80.299 PHLEBITIS AND THROMBOPHLEBITIS OF OTHER DEEP VESSELS OF UNSPECIFIED LOWER EXTREMITY (H): ICD-10-CM

## 2020-01-27 PROCEDURE — 87070 CULTURE OTHR SPECIMN AEROBIC: CPT | Performed by: FAMILY MEDICINE

## 2020-01-27 PROCEDURE — 99213 OFFICE O/P EST LOW 20 MIN: CPT | Performed by: FAMILY MEDICINE

## 2020-01-27 PROCEDURE — 87529 HSV DNA AMP PROBE: CPT | Mod: 59 | Performed by: FAMILY MEDICINE

## 2020-01-27 ASSESSMENT — MIFFLIN-ST. JEOR: SCORE: 1546.15

## 2020-01-27 NOTE — PATIENT INSTRUCTIONS
Thank you for choosing [facility].  You may be receiving an email and/or telephone survey request from [facility] Customer Experience regarding your visit today.  Please take a few minutes to respond to the survey to let us know how we are doing.      If you have questions or concerns, please contact us via [employer] or you can contact your care team at [phone].    Our Clinic hours are:  Monday 6:40 am  to 7:00 pm  Tuesday -Friday 6:40 am to 5:00 pm    The [place] outpatient lab hours are:  Monday - Friday 6:10 am to 4:45 pm  Saturdays 7:00 am to 11:00 am  Appointments are required, call [phone]    If you have clinical questions after hours or would like to schedule an appointment,  call the clinic at [phone].  Patient Education     Preventing Vaginitis     Use mild, unscented soap when you bathe or shower to avoid irritating your vagina.    Vaginitis is irritation or infection of the vagina or vulva (the outside opening of the vagina). Vaginitis can be caused by bacteria, viruses, parasites, or yeast. Chemicals (such as in perfumes or soaps or in spermicides) can sometimes be a cause. Vaginitis can be caused by hormone changes in pregnancy or with menopause. You can help prevent vaginitis. Follow the tips below. And see your healthcare provider if you have any symptoms.  Hygiene    Avoid chemicals. Do not use vaginal sprays. Do not use scented toilet paper or tampons that are scented. Sprays and scents have chemicals that can irritate your vagina.    Do not douche unless you are told to by your healthcare provider. Douching is rarely needed. And it upsets the normal balance in the vagina.    Wash yourself well. Wash the outer vaginal area (vulva) every day with mild, unscented soap. Keep it as dry as possible.    Wipe correctly. Make sure to wipe from front to back after a bowel movement. This helps keep from spreading bacteria from your anus to your vagina.    Change your tampon often.  During your period, make sure to change your tampon as often as directed on the package. This allows the normal flow of vaginal discharge and blood.  Lifestyle    Limit your number of sexual partners. The more partners you have, the greater your risk of infection. Using condoms helps reduce your risk.    Get enough sleep. Sleep helps keep your body s immune system healthy. This helps you fight infection.    Lose weight, if needed. Excess weight can reduce air circulation around your vagina. This can increase your risk of infection.    Exercise regularly. Regular activity helps keep your body healthy.    Take antibiotics only as directed. Antibiotics can change the normal chemical balance in the vagina.    Clothing    Don t sit in wet clothes. Yeast thrives when it s warm and damp.    Don t wear tight pants. And don t wear tights, leggings, or hose without a cotton crotch. These types of clothing trap warmth and moisture.    Wear cotton underwear. Cotton lets air circulate around the vagina.  Symptoms of vaginitis    Irritation, swelling, or itching of the genital area    Vaginal discharge    Bad vaginal odor    Pain or burning during urination   Date Last Reviewed: 12/1/2016 2000-2019 [employer]. [address] [place] [address]. All rights reserved. This information is not intended as a substitute for professional medical care. Always follow your healthcare professional's instructions.           Patient Education     Butoconazole vaginal cream  Brand Name: Gynazole-1  What is this medicine?  BUTOCONAZOLE (byoo toe TAZ smith) is an antifungal medicine. It is used to treat yeast infections of the vagina.  How should I use this medicine?  This medicine is for use in the vagina. Do not take by mouth. Wash hands before and after use. Read package directions carefully before using. If using the vaginal cream, screw the applicator onto the end of the tube and squeeze the tube to fill the  applicator. If using individually packaged or filled applicators, remove the outer covering. Lie on your back. Gently insert the applicator tip high in the vagina and push the plunger to release the cream into the vagina. Gently remove the applicator. Wash the applicator well with warm water and soap. Use at regular intervals. Finish the full course prescribed by your doctor or health care professional even if you think your condition is better. Do not stop using this medicine if your menstrual period starts during the time of treatment.  Talk to your pediatrician regarding the use of this medicine in children. Special care may be needed.  What side effects may I notice from receiving this medicine?  Side effects that you should report to your doctor or health care professional as soon as possible:    allergic reactions like skin rash, itching or hives, swelling of the face, lips, or tongue    pain or difficulty passing urine  Side effects that usually do not require medical attention (report to your doctor or health care professional if they continue or are bothersome):    vaginal irritation, itching or burning  What may interact with this medicine?  Interactions are not expected. Do not use any other vaginal products without telling your doctor or health care professional.  What if I miss a dose?  If you miss a dose, use it as soon as you can. If it is almost time for your next dose, use only that dose. Do not use double or extra doses.  Where should I keep my medicine?  Keep out of the reach of children.  Store at room temperature between 15 and 30 degrees C (59 and 86 degrees F). Do not freeze. Throw away any unused medicine after the expiration date.  What should I tell my health care provider before I take this medicine?  They need to know if you have any of these conditions:    an unusual or allergic reaction to butoconazole, other medicines, foods, dyes or preservatives    pregnant or trying to get  pregnant    breast-feeding  What should I watch for while using this medicine?  Tell your doctor or health care professional if your symptoms do not start to get better within a few days.  If you develop abdominal pain, a high fever, or foul-smelling vaginal discharge, contact your doctor or health care professional. Do not self-treat if you have these symptoms. These symptoms are signs of a more serious infection and you will need examined by a health care professional.  Your sexual partner may also need treatment. It is better not to have sex until you have finished your treatment. Do not rely on condoms, diaphragms, cervical caps or other barrier devices to prevent pregnancy. This medicine may damage these devices. This medicine can also interfere with the proper action of spermicides. This can lead to failure of these methods to prevent pregnancy.  Your clothing may get soiled. To help prevent reinfection, wear freshly washed cotton, not synthetic, underwear.  Do not use tampons or douches while using this medicine.  NOTE:This sheet is a summary. It may not cover all possible information. If you have questions about this medicine, talk to your doctor, pharmacist, or health care provider. Copyright  2019 [employer]

## 2020-01-27 NOTE — PROGRESS NOTES
Subjective     Ina Otoole is a 64 year old female who presents to clinic today for the following health issues:    64 yr old female here for a vaginal ulcer. Was first noticed a couple of months ago and patient reports that this appears to be worsening.There has been some occasion where the ulcer bled. She reports that she has not been sexually active for over 15 yrs. She is not aware of a possible trigger.    HPI   Concern - lesion in vaginal area   Onset: 5 mos     Description:   Patient has a lesion vaginal area has had for about 5 mos, she has been using Vaseline, warm water after urinating and changing toilet paper nothing has worked.      Intensity: mild    Progression of Symptoms:  Worsening getting larger     Accompanying Signs & Symptoms:  Vaginal discharge     Previous history of similar problem:   None     Precipitating factors:   Worsened by: with urination     Alleviating factors:  Improved by: none     Therapies Tried and outcome: Patient has tried changing toilet papers, using warm water after urinating and Vaseline none of them have worked       Patient Active Problem List   Diagnosis     Esophageal reflux     Myalgia and myositis     Goiter     Pain in joint, lower leg     OA (osteoarthritis) of knee     CARDIOVASCULAR SCREENING; LDL GOAL LESS THAN 160     Calculus of gallbladder with other cholecystitis, without mention of obstruction     Renal calculus or stone     IBS (irritable bowel syndrome)     Advanced directives, counseling/discussion     Preop general physical exam     Thrombophlebitis leg     Phlebitis and thrombophlebitis of other deep vessels of unspecified lower extremity (H)     Past Surgical History:   Procedure Laterality Date     ARTHROSCOPY KNEE RT/LT      left - meniscus repair     COLONOSCOPY N/A 8/24/2017    Procedure: COLONOSCOPY;  Colonoscopy  ;  Surgeon: Abhishek Escobedo MD;  Location: [facility]     KNEE SURGERY  9/2013    left knee replacement     LAPAROSCOPIC  CHOLECYSTECTOMY  2011    Procedure:LAPAROSCOPIC CHOLECYSTECTOMY; Surgeon:SUMAYA ZAMORANO; Location:[facility]     STONE ANALYSIS       THYROID BIOPSY         Social History     Tobacco Use     Smoking status: Former Smoker     Packs/day: 0.00     Last attempt to quit: 2000     Years since quittin.9     Smokeless tobacco: Never Used   Substance Use Topics     Alcohol use: Yes     Comment: only for special occassions     Family History   Problem Relation Age of Onset     Diabetes Mother      Hypertension Mother      Cerebrovascular Disease Mother      Cancer Mother         melanoma     Dementia Mother      Respiratory Father         copd     C.A.D. Father      Cancer - colorectal Father      Breast Cancer Maternal Grandmother      Diabetes Paternal Grandmother      Hypertension Brother      Hypertension Sister      Diabetes Sister      Neurologic Disorder Sister         ms     Other - See Comments Daughter         Transverse Myelitis     Asthma No family hx of      Prostate Cancer No family hx of          Current Outpatient Medications   Medication Sig Dispense Refill     Ibuprofen (ADVIL PO) Take 400 mg by mouth 2 times daily        ranitidine (ZANTAC) 150 MG tablet Take 1 tablet (150 mg) by mouth 2 times daily (Needs follow-up appointment for this medication) 60 tablet 0     senna-docusate (SENOKOT-S;PERICOLACE) 8.6-50 MG per tablet Take 2 tablets by mouth 2 times daily        ACETAMINOPHEN PO Take 1,000 mg by mouth 2 times daily       ASPIRIN EC PO Take 325 mg by mouth 2 times daily       Allergies   Allergen Reactions     Sulfa Drugs Hives     BP Readings from Last 3 Encounters:   20 124/82   10/17/19 116/72   10/15/18 102/66    Wt Readings from Last 3 Encounters:   20 93 kg (205 lb)   10/17/19 96 kg (211 lb 9.6 oz)   10/15/18 85.7 kg (189 lb)                        Reviewed and updated as needed this visit by Provider  Tobacco  Allergies  Meds  Problems  Med Hx  Surg Hx   "Fam Hx         Review of Systems         Objective    /82   Pulse 75   Temp 98.1  F (36.7  C) (Tympanic)   Resp 18   Ht 1.772 m (5' 9.75\")   Wt 93 kg (205 lb)   SpO2 98%   BMI 29.63 kg/m    Body mass index is 29.63 kg/m .  Physical Exam   GENERAL: healthy, alert and no distress  HENT: ear canals and TM's normal, nose and mouth without ulcers or lesions  NECK: no adenopathy, no asymmetry, masses, or scars and thyroid normal to palpation  RESP: lungs clear to auscultation - no rales, rhonchi or wheezes  CV: regular rate and rhythm, normal S1 S2, no S3 or S4, no murmur, click or rub, no peripheral edema and peripheral pulses strong  ABDOMEN: soft, nontender, no hepatosplenomegaly, no masses and bowel sounds normal   (female): vaginal mucosal atrophy and vaginal skin findings: large ulcer on the vaginal wall.   MS: no gross musculoskeletal defects noted, no edema    Diagnostic Test Results:  Labs reviewed in Epic  Results for orders placed or performed in visit on 01/27/20   HSV 1 and 2 DNA by PCR     Status: None   Result Value Ref Range    HSV Specimen Type Vagina     HSV Type 1 PCR Negative NEG^Negative    HSV Type 2 PCR Negative NEG^Negative   Genital Culture Aerobic Bacterial     Status: None   Result Value Ref Range    Specimen Description Vulva Ulcer     Special Requests Specimen collected in eSwab transport (white cap)     Culture Micro Moderate growth  Normal urogenital mitchell              Assessment & Plan     1. Vulvar ulcer  Patient referred to OB/Gyn   - OB/GYN REFERRAL  - HSV 1 and 2 DNA by PCR  - Genital Culture Aerobic Bacterial    2. Phlebitis and thrombophlebitis of other deep vessels of unspecified lower extremity (H)  Resolved                  FUTURE APPOINTMENTS:       - Follow-up visit in one month or sooner as needed.  Patient Instructions           Thank you for choosing [facility].  You may be receiving an email and/or telephone survey request from [facility] [employer] Customer " Experience regarding your visit today.  Please take a few minutes to respond to the survey to let us know how we are doing.      If you have questions or concerns, please contact us via [employer] or you can contact your care team at [phone].    Our Clinic hours are:  Monday 6:40 am  to 7:00 pm  Tuesday -Friday 6:40 am to 5:00 pm    The [place] outpatient lab hours are:  Monday - Friday 6:10 am to 4:45 pm  Saturdays 7:00 am to 11:00 am  Appointments are required, call [phone]    If you have clinical questions after hours or would like to schedule an appointment,  call the clinic at [phone].  Patient Education     Preventing Vaginitis     Use mild, unscented soap when you bathe or shower to avoid irritating your vagina.    Vaginitis is irritation or infection of the vagina or vulva (the outside opening of the vagina). Vaginitis can be caused by bacteria, viruses, parasites, or yeast. Chemicals (such as in perfumes or soaps or in spermicides) can sometimes be a cause. Vaginitis can be caused by hormone changes in pregnancy or with menopause. You can help prevent vaginitis. Follow the tips below. And see your healthcare provider if you have any symptoms.  Hygiene    Avoid chemicals. Do not use vaginal sprays. Do not use scented toilet paper or tampons that are scented. Sprays and scents have chemicals that can irritate your vagina.    Do not douche unless you are told to by your healthcare provider. Douching is rarely needed. And it upsets the normal balance in the vagina.    Wash yourself well. Wash the outer vaginal area (vulva) every day with mild, unscented soap. Keep it as dry as possible.    Wipe correctly. Make sure to wipe from front to back after a bowel movement. This helps keep from spreading bacteria from your anus to your vagina.    Change your tampon often. During your period, make sure to change your tampon as often as directed on the package. This allows the normal flow of vaginal discharge  and blood.  Lifestyle    Limit your number of sexual partners. The more partners you have, the greater your risk of infection. Using condoms helps reduce your risk.    Get enough sleep. Sleep helps keep your body s immune system healthy. This helps you fight infection.    Lose weight, if needed. Excess weight can reduce air circulation around your vagina. This can increase your risk of infection.    Exercise regularly. Regular activity helps keep your body healthy.    Take antibiotics only as directed. Antibiotics can change the normal chemical balance in the vagina.    Clothing    Don t sit in wet clothes. Yeast thrives when it s warm and damp.    Don t wear tight pants. And don t wear tights, leggings, or hose without a cotton crotch. These types of clothing trap warmth and moisture.    Wear cotton underwear. Cotton lets air circulate around the vagina.  Symptoms of vaginitis    Irritation, swelling, or itching of the genital area    Vaginal discharge    Bad vaginal odor    Pain or burning during urination   Date Last Reviewed: 12/1/2016 2000-2019 [employer]. [address] [place] [address]. All rights reserved. This information is not intended as a substitute for professional medical care. Always follow your healthcare professional's instructions.           Patient Education     Butoconazole vaginal cream  Brand Name: Gynazole-1  What is this medicine?  BUTOCONAZOLE (byoo toe TAZ smith) is an antifungal medicine. It is used to treat yeast infections of the vagina.  How should I use this medicine?  This medicine is for use in the vagina. Do not take by mouth. Wash hands before and after use. Read package directions carefully before using. If using the vaginal cream, screw the applicator onto the end of the tube and squeeze the tube to fill the applicator. If using individually packaged or filled applicators, remove the outer covering. Lie on your back. Gently insert the applicator tip  high in the vagina and push the plunger to release the cream into the vagina. Gently remove the applicator. Wash the applicator well with warm water and soap. Use at regular intervals. Finish the full course prescribed by your doctor or health care professional even if you think your condition is better. Do not stop using this medicine if your menstrual period starts during the time of treatment.  Talk to your pediatrician regarding the use of this medicine in children. Special care may be needed.  What side effects may I notice from receiving this medicine?  Side effects that you should report to your doctor or health care professional as soon as possible:    allergic reactions like skin rash, itching or hives, swelling of the face, lips, or tongue    pain or difficulty passing urine  Side effects that usually do not require medical attention (report to your doctor or health care professional if they continue or are bothersome):    vaginal irritation, itching or burning  What may interact with this medicine?  Interactions are not expected. Do not use any other vaginal products without telling your doctor or health care professional.  What if I miss a dose?  If you miss a dose, use it as soon as you can. If it is almost time for your next dose, use only that dose. Do not use double or extra doses.  Where should I keep my medicine?  Keep out of the reach of children.  Store at room temperature between 15 and 30 degrees C (59 and 86 degrees F). Do not freeze. Throw away any unused medicine after the expiration date.  What should I tell my health care provider before I take this medicine?  They need to know if you have any of these conditions:    an unusual or allergic reaction to butoconazole, other medicines, foods, dyes or preservatives    pregnant or trying to get pregnant    breast-feeding  What should I watch for while using this medicine?  Tell your doctor or health care professional if your symptoms do not  start to get better within a few days.  If you develop abdominal pain, a high fever, or foul-smelling vaginal discharge, contact your doctor or health care professional. Do not self-treat if you have these symptoms. These symptoms are signs of a more serious infection and you will need examined by a health care professional.  Your sexual partner may also need treatment. It is better not to have sex until you have finished your treatment. Do not rely on condoms, diaphragms, cervical caps or other barrier devices to prevent pregnancy. This medicine may damage these devices. This medicine can also interfere with the proper action of spermicides. This can lead to failure of these methods to prevent pregnancy.  Your clothing may get soiled. To help prevent reinfection, wear freshly washed cotton, not synthetic, underwear.  Do not use tampons or douches while using this medicine.  NOTE:This sheet is a summary. It may not cover all possible information. If you have questions about this medicine, talk to your doctor, pharmacist, or health care provider. Copyright  2019 [employer]               Return in about 4 weeks (around 2/24/2020).    Verito Ashby MD  [facility]

## 2020-01-28 ENCOUNTER — HOSPITAL ENCOUNTER (OUTPATIENT)
Dept: MAMMOGRAPHY | Facility: CLINIC | Age: 65
Discharge: HOME | End: 2020-01-28
Attending: FAMILY MEDICINE | Admitting: FAMILY MEDICINE
Payer: MEDICARE

## 2020-01-28 DIAGNOSIS — Z12.31 VISIT FOR SCREENING MAMMOGRAM: ICD-10-CM

## 2020-01-28 LAB
HSV1 DNA SPEC QL NAA+PROBE: NEGATIVE
HSV2 DNA SPEC QL NAA+PROBE: NEGATIVE
SPECIMEN SOURCE: NORMAL

## 2020-01-28 PROCEDURE — 77067 SCR MAMMO BI INCL CAD: CPT

## 2020-01-29 PROBLEM — I80.299: Status: ACTIVE | Noted: 2020-01-29

## 2020-01-29 LAB
BACTERIA SPEC CULT: NORMAL
Lab: NORMAL
SPECIMEN SOURCE: NORMAL

## 2020-01-29 NOTE — RESULT ENCOUNTER NOTE
Please inform patient that test result was within normal parameters.   Thank you.     Verito Ashby M.D.

## 2020-02-20 ENCOUNTER — OFFICE VISIT (OUTPATIENT)
Dept: OBGYN | Facility: CLINIC | Age: 65
End: 2020-02-20
Payer: MEDICARE

## 2020-02-20 VITALS
TEMPERATURE: 97.5 F | SYSTOLIC BLOOD PRESSURE: 128 MMHG | WEIGHT: 206.4 LBS | HEART RATE: 91 BPM | DIASTOLIC BLOOD PRESSURE: 85 MMHG | RESPIRATION RATE: 16 BRPM | BODY MASS INDEX: 29.55 KG/M2 | HEIGHT: 70 IN

## 2020-02-20 DIAGNOSIS — N76.6 VULVAR ULCER: Primary | ICD-10-CM

## 2020-02-20 PROCEDURE — 88305 TISSUE EXAM BY PATHOLOGIST: CPT | Performed by: OBSTETRICS & GYNECOLOGY

## 2020-02-20 PROCEDURE — 88312 SPECIAL STAINS GROUP 1: CPT | Mod: 26 | Performed by: OBSTETRICS & GYNECOLOGY

## 2020-02-20 PROCEDURE — 99203 OFFICE O/P NEW LOW 30 MIN: CPT | Mod: 25 | Performed by: OBSTETRICS & GYNECOLOGY

## 2020-02-20 PROCEDURE — 88312 SPECIAL STAINS GROUP 1: CPT | Performed by: OBSTETRICS & GYNECOLOGY

## 2020-02-20 PROCEDURE — 56605 BIOPSY OF VULVA/PERINEUM: CPT | Performed by: OBSTETRICS & GYNECOLOGY

## 2020-02-20 PROCEDURE — 88305 TISSUE EXAM BY PATHOLOGIST: CPT | Mod: 26 | Performed by: OBSTETRICS & GYNECOLOGY

## 2020-02-20 ASSESSMENT — MIFFLIN-ST. JEOR: SCORE: 1552.5

## 2020-02-20 NOTE — NURSING NOTE
"Initial /85 (BP Location: Right arm, Patient Position: Chair, Cuff Size: Adult Regular)   Pulse 91   Temp 97.5  F (36.4  C) (Tympanic)   Resp 16   Ht 1.772 m (5' 9.75\")   Wt 93.6 kg (206 lb 6.4 oz)   Breastfeeding No   BMI 29.83 kg/m   Estimated body mass index is 29.83 kg/m  as calculated from the following:    Height as of this encounter: 1.772 m (5' 9.75\").    Weight as of this encounter: 93.6 kg (206 lb 6.4 oz). .    Celi Marcum, NORBERTO    "

## 2020-02-20 NOTE — PROGRESS NOTES
Ina is a 64 year old  here for consultation at the request of Verito Ashby for vulvar/vaginal ulcer.  First noted ~ 5 months ago and has been increasing in size.  Initial symptoms included stinging with urination which prompted her to look down there.  Was able to see it on her right side with a mirror, which she primarily worked on keeping it clean.  Initially was pinky nail sized and now it is thumb nail size.  There may be a discharge associated with it.  Negative urinalysis in PCP office.     Denies trauma or viral illnesses.  No hx of abnormal paps (2016 last pap).  No sexual activity in 30 years.      ROS: Ten point review of systems was reviewed and negative except the above.    Past Medical History:   Diagnosis Date     Calculus of gallbladder with other cholecystitis, without mention of obstruction 2011     Past Surgical History:   Procedure Laterality Date     ARTHROSCOPY KNEE RT/LT      left - meniscus repair     COLONOSCOPY N/A 2017    Procedure: COLONOSCOPY;  Colonoscopy  ;  Surgeon: Abhishek Escobedo MD;  Location: [facility]     KNEE SURGERY  2013    left knee replacement     LAPAROSCOPIC CHOLECYSTECTOMY  2011    Procedure:LAPAROSCOPIC CHOLECYSTECTOMY; Surgeon:SUMAYA ZAMORANO; Location:[facility]     STONE ANALYSIS       THYROID BIOPSY       Patient Active Problem List   Diagnosis     Esophageal reflux     Myalgia and myositis     Goiter     Status post total left knee replacement     CARDIOVASCULAR SCREENING; LDL GOAL LESS THAN 160     Renal calculus or stone     IBS (irritable bowel syndrome)     Advanced directives, counseling/discussion     Thrombophlebitis leg     Phlebitis and thrombophlebitis of other deep vessels of unspecified lower extremity (H)       ALL/Meds: Her medication and allergy histories were reviewed and are documented in their appropriate chart areas.    Social History     Tobacco Use     Smoking status: Former Smoker     Packs/day: 0.00  "    Last attempt to quit: 2000     Years since quittin.0     Smokeless tobacco: Never Used   Substance Use Topics     Alcohol use: Yes     Comment: only for special occassions     Drug use: No       FH: Her family history was reviewed and documented in its appropriate chart area.    PE: /85 (BP Location: Right arm, Patient Position: Chair, Cuff Size: Adult Regular)   Pulse 91   Temp 97.5  F (36.4  C) (Tympanic)   Resp 16   Ht 1.772 m (5' 9.75\")   Wt 93.6 kg (206 lb 6.4 oz)   Breastfeeding No   BMI 29.83 kg/m    Body mass index is 29.83 kg/m .    General Appearance:  healthy, alert, active, no distress  HEENT: NCAT  Abdomen: Soft, nontender.  Normal bowel sounds.  No masses  Pelvic:       - Ext: Normal external genitalia with the exception of a well demarcated erythematous shallow ulceration ~ 2 cm on the medial aspect of the labia minora.     An area of the external genitalia was identified for biopsy located in the right portion of the labia minora, medial.  The skin was cleansed and the region infiltrated with 1% lidocaine.  Utilizing a 4 mm punch biopsy, a representative area of tissue x 2 was excised and sent to pathology.  Hemostasis was achieved with silver nitrate.     A/P     ICD-10-CM    1. Vulvar ulcer N76.6 Surgical pathology exam       1. Ddx includes lichen planus versus malignancy or other inflammatory process.  Discussed awaiting pathology to find out exactly what process is currently occurring.      Supriya Arrieta M.D.    30 minutes was spent face to face with the patient today, not including time spent on procedure, discussing her history, diagnosis, and follow-up plan as noted above.  Over 50% of the visit was spent in counseling and coordination of care.      "

## 2020-02-25 DIAGNOSIS — N76.6 VULVAR ULCER: Primary | ICD-10-CM

## 2020-02-25 LAB — COPATH REPORT: NORMAL

## 2020-02-25 RX ORDER — TRIAMCINOLONE ACETONIDE 5 MG/G
OINTMENT TOPICAL
Qty: 15 G | Refills: 1 | Status: SHIPPED | OUTPATIENT
Start: 2020-02-25 | End: 2020-12-30

## 2020-03-24 ENCOUNTER — TELEPHONE (OUTPATIENT)
Dept: FAMILY MEDICINE | Facility: CLINIC | Age: 65
End: 2020-03-24

## 2020-03-24 DIAGNOSIS — K21.9 GASTROESOPHAGEAL REFLUX DISEASE WITHOUT ESOPHAGITIS: Primary | ICD-10-CM

## 2020-03-24 RX ORDER — FAMOTIDINE 20 MG/1
20 TABLET, FILM COATED ORAL 2 TIMES DAILY
Qty: 30 TABLET | Refills: 6 | Status: SHIPPED | OUTPATIENT
Start: 2020-03-24

## 2020-03-24 NOTE — TELEPHONE ENCOUNTER
Dr. Ashby,    Pharmacy wanting substitute medication for zantac.  On the medication list it states needs to be seen for this.  Patient recently seen in office on 1/27/2020.  Please advise. Melyssa HOOKS RN

## 2020-03-24 NOTE — TELEPHONE ENCOUNTER
Reason for Call:  Medication question:    Do you use a [facility]?  Name of the pharmacy and phone number for the current request:  [facility] [phone]    Name of the medication requested: Substiute, for Ranitidine    Other request: Ranitidine is recalled.    Can we leave a detailed message on this number? YES    Phone number patient can be reached at: Cell number on file:    Telephone Information:   Mobile [phone]       Best Time: Any    Call taken on 3/24/2020 at 12:24 PM by Jennifer Lee

## 2020-04-16 ENCOUNTER — TELEPHONE (OUTPATIENT)
Dept: OBGYN | Facility: CLINIC | Age: 65
End: 2020-04-16

## 2020-04-16 DIAGNOSIS — N76.6 VULVAR ULCER: Primary | ICD-10-CM

## 2020-04-16 RX ORDER — CLOBETASOL PROPIONATE 0.5 MG/G
OINTMENT TOPICAL 2 TIMES DAILY
Qty: 30 G | Refills: 0 | Status: SHIPPED | OUTPATIENT
Start: 2020-04-16 | End: 2022-07-22

## 2020-04-16 NOTE — TELEPHONE ENCOUNTER
Clobetasol ordered for patient.  Hopefully this helps more.  If she is still symptomatic after a few weeks, we may need to consider complete excision of the ulcer.     Supriya Arrieta M.D.

## 2020-04-16 NOTE — TELEPHONE ENCOUNTER
Pt reports that the Triamcinolone is not working for the vulvar ulcer.  She mentions that Dr. Arrieta mentioned another medication but does not remember the name of it.  She did say that insurance may not cover it and would like to pursue a prior authorization if that is what she has to do.    Sameera Gamboa  [facility] RN

## 2020-04-17 NOTE — TELEPHONE ENCOUNTER
Pt notified of below.  Pt reports understanding.  Pt does not have further questions or concerns.    Patient will try for 1-2 weeks and if no improvement or has questions/concerns, she will call for a telephone visit with Dr. Arrieta.      Marium Chahal   [facility]  RN

## 2020-12-28 ENCOUNTER — TELEPHONE (OUTPATIENT)
Dept: OBGYN | Facility: CLINIC | Age: 65
End: 2020-12-28

## 2020-12-28 NOTE — TELEPHONE ENCOUNTER
I would have her wait to be evaluated this week for the pain and discharge and Dr. Arrieta can see at that time if she thinks it is the same thing.    Thanks,  Nadine Bey

## 2020-12-28 NOTE — TELEPHONE ENCOUNTER
Pt notified of below.  Pt reports understanding.  Pt does not have further questions or concerns.    Marium Chahal   [facility]  RN

## 2020-12-28 NOTE — TELEPHONE ENCOUNTER
Reason for call:  Patient reporting a symptom    Symptom or request: Same symptoms as back in may raw ulcer and smelly discharge uncombable and she thinks it is larger this time.    Duration (how long have symptoms been present): since april    Have you been treated for this before? Yes    Phone Number patient can be reached at:  Home number on file [phone] (home)    Best Time:  any    Can we leave a detailed message on this number:  YES    Call taken on 12/28/2020 at 11:09 AM by Danisha Lopez

## 2020-12-28 NOTE — TELEPHONE ENCOUNTER
Return call to patient.  Spoke with patient on the phone.    Patient reports she saw Dr. Arrieta for a vulvar ulcer last spring.  Dx was lichen planus.  Used Clobetasol for 14 days, thought it healed.  Now has noticed re-occurrence.   Same symptoms.  Foul smelling discharge.  Pain and discomfort.    Wondering if she can try the Clobetasol again for 14 days or should she be seen for further evaluation. Appointment with Dr. Arrieta on Wednesday if needed. Patient has medication if she should try it.    Please review and advise.  Thank you.    Marium Chahal   [facility]  RN

## 2020-12-30 ENCOUNTER — OFFICE VISIT (OUTPATIENT)
Dept: OBGYN | Facility: CLINIC | Age: 65
End: 2020-12-30
Payer: MEDICARE

## 2020-12-30 VITALS
DIASTOLIC BLOOD PRESSURE: 85 MMHG | WEIGHT: 209.2 LBS | SYSTOLIC BLOOD PRESSURE: 140 MMHG | BODY MASS INDEX: 30.98 KG/M2 | RESPIRATION RATE: 14 BRPM | TEMPERATURE: 95.2 F | HEART RATE: 78 BPM | HEIGHT: 69 IN

## 2020-12-30 DIAGNOSIS — N76.6 VULVAR ULCER: Primary | ICD-10-CM

## 2020-12-30 DIAGNOSIS — N89.8 VAGINAL DISCHARGE: ICD-10-CM

## 2020-12-30 LAB
SPECIMEN SOURCE: NORMAL
WET PREP SPEC: NORMAL

## 2020-12-30 PROCEDURE — 87210 SMEAR WET MOUNT SALINE/INK: CPT | Performed by: OBSTETRICS & GYNECOLOGY

## 2020-12-30 PROCEDURE — 99213 OFFICE O/P EST LOW 20 MIN: CPT | Performed by: OBSTETRICS & GYNECOLOGY

## 2020-12-30 ASSESSMENT — MIFFLIN-ST. JEOR: SCORE: 1548.3

## 2020-12-30 NOTE — NURSING NOTE
"Initial BP (!) 140/85 (BP Location: Right arm, Patient Position: Sitting, Cuff Size: Adult Regular)   Pulse 78   Temp 95.2  F (35.1  C) (Tympanic)   Resp 14   Ht 1.753 m (5' 9\")   Wt 94.9 kg (209 lb 3.2 oz)   BMI 30.89 kg/m   Estimated body mass index is 30.89 kg/m  as calculated from the following:    Height as of this encounter: 1.753 m (5' 9\").    Weight as of this encounter: 94.9 kg (209 lb 3.2 oz). .      "

## 2020-12-30 NOTE — PROGRESS NOTES
"Ina is a 65 year old here for follow up of vulvar ulcer.  Biopsy taken 2020 was nonspecific, favored lichen simplex chronicus--no evidence of viral pathology or atypia.  Patient prescribed triamcinolone followed by clobetasol.  Patient did well with clobetasol and used for 14 days.  Patient had been well until a few days ago when she noted similar symptoms of discomfort and odor.      ROS: Ten point review of systems was reviewed and negative except the above.    PMH: Her past medical, surgical, and obstetric histories were reviewed and are documented in their appropriate chart areas.    ALL/Meds: Her medication and allergy histories were reviewed and are documented in their appropriate chart areas.    Social History     Tobacco Use     Smoking status: Former Smoker     Packs/day: 0.00     Quit date: 2000     Years since quittin.8     Smokeless tobacco: Never Used   Substance Use Topics     Alcohol use: Yes     Comment: only for special occassions     Drug use: No        FH: Her family history was reviewed and documented in its appropriate chart area.     PE: BP (!) 140/85 (BP Location: Right arm, Patient Position: Sitting, Cuff Size: Adult Regular)   Pulse 78   Temp 95.2  F (35.1  C) (Tympanic)   Resp 14   Ht 1.753 m (5' 9\")   Wt 94.9 kg (209 lb 3.2 oz)   BMI 30.89 kg/m      General Appearance:  healthy, alert, active, no distress  HEENT: NCAT  Pelvic:  Ext: Normal external genitalia with the exception of a well demarcated erythematous shallow ulceration ~ 2 cm on the medial aspect of the labia minora--similar to before.  No weeping or exudate.  Vagina clear, cervix normal appearing.  Wet prep obtained.     A/P 65 year old here for     ICD-10-CM    1. Vulvar ulcer  N76.6    2. Vaginal discharge  N89.8 Wet prep        1. Advised patient to resume clobetasol and assess for symptom relief.  If no relief, we discussed possible removal of the lesion.  Reviewed likely chronic aspect of her " condition and intermittent need for clobetasol.     2. Wet prep pending.     Supriya Arrieta M.D.      15 minutes was spent face to face with the patient today discussing her history, diagnosis, and follow-up plan as noted above.  Over 50% of the visit was spent in counseling and coordination of care.

## 2021-01-03 ENCOUNTER — HOSPITAL ENCOUNTER (EMERGENCY)
Facility: CLINIC | Age: 66
Discharge: HOME | End: 2021-01-03
Attending: EMERGENCY MEDICINE | Admitting: EMERGENCY MEDICINE
Payer: MEDICARE

## 2021-01-03 ENCOUNTER — NURSE TRIAGE (OUTPATIENT)
Dept: NURSING | Facility: CLINIC | Age: 66
End: 2021-01-03

## 2021-01-03 VITALS
DIASTOLIC BLOOD PRESSURE: 102 MMHG | HEART RATE: 105 BPM | SYSTOLIC BLOOD PRESSURE: 145 MMHG | TEMPERATURE: 97.8 F | OXYGEN SATURATION: 98 % | WEIGHT: 209 LBS | BODY MASS INDEX: 30.86 KG/M2 | RESPIRATION RATE: 16 BRPM

## 2021-01-03 DIAGNOSIS — S61.210A LACERATION OF RIGHT INDEX FINGER WITHOUT FOREIGN BODY, NAIL DAMAGE STATUS UNSPECIFIED, INITIAL ENCOUNTER: ICD-10-CM

## 2021-01-03 PROCEDURE — 250N000013 HC RX MED GY IP 250 OP 250 PS 637: Performed by: EMERGENCY MEDICINE

## 2021-01-03 PROCEDURE — 99284 EMERGENCY DEPT VISIT MOD MDM: CPT | Performed by: EMERGENCY MEDICINE

## 2021-01-03 PROCEDURE — 99283 EMERGENCY DEPT VISIT LOW MDM: CPT | Performed by: EMERGENCY MEDICINE

## 2021-01-03 RX ADMIN — AMOXICILLIN AND CLAVULANATE POTASSIUM 1 TABLET: 875; 125 TABLET, FILM COATED ORAL at 21:48

## 2021-01-03 ASSESSMENT — ENCOUNTER SYMPTOMS
WOUND: 1
FEVER: 0

## 2021-01-03 NOTE — ED AVS SNAPSHOT
[facility] Emergency Dept  [address] [address]  Phone: [phone]  Fax: [phone]                                    Ina Otoole   MRN: [id number]    Department: [facility] Emergency Dept   Date of Visit: 1/3/2021           After Visit Summary Signature Page    I have received my discharge instructions, and my questions have been answered. I have discussed any challenges I see with this plan with the nurse or doctor.    ..........................................................................................................................................  Patient/Patient Representative Signature      ..........................................................................................................................................  Patient Representative Print Name and Relationship to Patient    ..................................................               ................................................  Date                                   Time    ..........................................................................................................................................  Reviewed by Signature/Title    ...................................................              ..............................................  Date                                               Time          22EPIC Rev 08/18

## 2021-01-04 NOTE — ED PROVIDER NOTES
"  History     Chief Complaint   Patient presents with     Hand Injury     right hand digit #2. Daughters dog swallowed a chicken bone and she \" tried to reach in and get the bone and she bit down on my finger in the process\"      HPI  Ina Otoole is a 65 year old female who is right-hand dominant, presenting to the emergency department with concerns regarding right hand/second digit injury.  Patient tells me that she was washing dishes yesterday, when subsequently a chicken, with bone fell off of the plate, and dog subsequently attempted to eat the bone with chicken.  Patient reached into the dog's mouth, and the dog clamped down on the patient's finger.  This caused a small laceration.  Injury occurred yesterday around 6 PM, about 28 hours ago.  Patient washed her hands out well, and has been washing her hands with soapy water on regular routine basis.  Does have mild amounts of swelling near the location of the injury.  No spreading redness.     Allergies:  Allergies   Allergen Reactions     Sulfa Drugs Hives       Problem List:    Patient Active Problem List    Diagnosis Date Noted     Renal calculus or stone 07/28/2011     Priority: High     Left, with mild hydronephrosis           Vulvar ulcer 12/30/2020     Priority: Medium     Phlebitis and thrombophlebitis of other deep vessels of unspecified lower extremity (H) 01/29/2020     Priority: Medium     Thrombophlebitis leg 08/02/2018     Priority: Medium     Advanced directives, counseling/discussion 08/28/2013     Priority: Medium     Advance Care Planning:   Receipt of ACP document:  Received: Health Care Directive which was witnessed or notarized on 8/22/13.  Document not previously scanned.  Validation form completed and sent with document to be scanned.  Code Status reflects choices in most recent ACP document.  Confirmed/documented designated decision maker(s). See permanent comments section of demographics in clinical tab. View document(s) and details " by clicking on code status.   Added by Luz Marina Jennings on 9/3/2013.           IBS (irritable bowel syndrome) 04/24/2012     Priority: Medium     With chronic constipation       CARDIOVASCULAR SCREENING; LDL GOAL LESS THAN 160 10/31/2010     Priority: Medium     Status post total left knee replacement 11/26/2008     Priority: Medium     Myalgia and myositis      Priority: Medium     Dx in 1995 based on pain sx.   [facility] into trigger points, went to pain clinic, PT.  Never had any improvement.    Problem list name updated by automated process. Provider to review       Goiter      Priority: Medium     Had aspiration, was on replacement for about a year, hasn't needed since.    Problem list name updated by automated process. Provider to review       Esophageal reflux 02/07/2007     Priority: Medium     Was given script for PPI, but couldn't afford, has been using tums and zantac          Past Medical History:    Past Medical History:   Diagnosis Date     Calculus of gallbladder with other cholecystitis, without mention of obstruction 6/7/2011       Past Surgical History:    Past Surgical History:   Procedure Laterality Date     ARTHROSCOPY KNEE RT/LT      left - meniscus repair     COLONOSCOPY N/A 8/24/2017    Procedure: COLONOSCOPY;  Colonoscopy  ;  Surgeon: Abhishek Escobedo MD;  Location: [facility]     KNEE SURGERY  9/2013    left knee replacement     LAPAROSCOPIC CHOLECYSTECTOMY  6/29/2011    Procedure:LAPAROSCOPIC CHOLECYSTECTOMY; Surgeon:SUMAYA ZAMORANO; Location:[facility]     STONE ANALYSIS  2011     THYROID BIOPSY       TUBAL LIGATION  1980       Family History:    Family History   Problem Relation Age of Onset     Diabetes Mother      Hypertension Mother      Cerebrovascular Disease Mother      Cancer Mother         melanoma     Dementia Mother      Respiratory Father         copd     C.A.D. Father      Cancer - colorectal Father      Breast Cancer Maternal  Grandmother      Diabetes Paternal Grandmother      Hypertension Brother      Hypertension Sister      Diabetes Sister      Neurologic Disorder Sister         ms     Other - See Comments Daughter         Transverse Myelitis     Asthma No family hx of      Prostate Cancer No family hx of        Social History:  Marital Status:   [5]  Social History     Tobacco Use     Smoking status: Former Smoker     Packs/day: 0.00     Quit date: 2000     Years since quittin.8     Smokeless tobacco: Never Used   Substance Use Topics     Alcohol use: Yes     Comment: only for special occassions     Drug use: No        Medications:         amoxicillin-clavulanate (AUGMENTIN) 875-125 MG tablet       ASPIRIN EC PO       clobetasol (TEMOVATE) 0.05 % external ointment       famotidine (PEPCID) 20 MG tablet       Ibuprofen (ADVIL PO)       senna-docusate (SENOKOT-S;PERICOLACE) 8.6-50 MG per tablet          Review of Systems   Constitutional: Negative for fever.   Skin: Positive for wound.       Physical Exam   BP: (!) 146/89  Pulse: 92  Temp: 97.8  F (36.6  C)  Resp: 16  Weight: 94.8 kg (209 lb)  SpO2: 98 %      Physical Exam  BP (!) 146/89   Pulse 92   Temp 97.8  F (36.6  C) (Temporal)   Resp 16   Wt 94.8 kg (209 lb)   SpO2 98%   BMI 30.86 kg/m    General: alert and in no acute distress  Head: atraumatic, normocephalic  Abd: nondistended  Musculoskel/Extremities: Right hand with punctate lesions, with mild amounts of swelling as shown below            Neuro: Patient awake, alert, oriented, speech is fluent, gait is normal  Psychiatric: affect/mood normal, cooperative, normal judgement/insight and memory intact      ED Course        Procedures               Critical Care time:  none               No results found for this or any previous visit (from the past 24 hour(s)).    Medications   amoxicillin-clavulanate (AUGMENTIN) 875-125 MG per tablet 1 tablet (has no administration in time range)       Assessments & Plan  (with Medical Decision Making)  65 year old female, presenting to the emergency department with concerns regarding dog bite to the right index finger.  Injury occurred approximately 28 hours prior to ED arrival.  No injury elsewhere.  Does have mild amounts of swelling in the location of the dog bite and small laceration.  No fever.  Does have mild swelling which is localized.  Patient will be started on prophylactic antibiotics.  The dog belongs to daughter, and dog will be monitored, and do not feel that rabies prophylaxis is indicated.  Otherwise instructed to follow-up in clinic as needed, and be seen if new, or worsening symptoms develop.     I have reviewed the nursing notes.    I have reviewed the findings, diagnosis, plan and need for follow up with the patient.       New Prescriptions    AMOXICILLIN-CLAVULANATE (AUGMENTIN) 875-125 MG TABLET    Take 1 tablet by mouth 2 times daily for 10 days       Final diagnoses:   Laceration of right index finger without foreign body, nail damage status unspecified, initial encounter       1/3/2021   [facility] EMERGENCY DEPT     Ryne Santo MD  01/03/21 8031

## 2021-01-04 NOTE — TELEPHONE ENCOUNTER
"Patient calling reporting she was bitten by her daughter's dog yesterday on her finger. States it has small bleeding. Patient states she washed the wound and it appears as a \"red bump\" that is barely \"noticeable\". Unsure if the dog had all her immunizations. States she would assume but not certain. Per guideline, informed RN will page on call provider for second level triage.     Paged on-call provider BRANDYN PUGH for [facility], via [employer] at 8:08pm to call RN directly at [phone].    Dr. Pugh called RN and advised patient to be seen at the emergency department. RN called patient and advised patient to be seen at the emergency department.     Patient states she will call her daughter and confirm if the dog has all their shots and if the dog does have the shots will call back.     Hector Mesa RN  [facility]     COVID 19 Nurse Triage Plan/Patient Instructions    Please be aware that novel coronavirus (COVID-19) may be circulating in the community. If you develop symptoms such as fever, cough, or SOB or if you have concerns about the presence of another infection including coronavirus (COVID-19), please contact your health care provider or visit [email or web address].     Disposition/Instructions    ED Visit recommended. Follow protocol based instructions.     Bring Your Own Device:  Please also bring your smart device(s) (smart phones, tablets, laptops) and their charging cables for your personal use and to communicate with your care team during your visit.    Thank you for taking steps to prevent the spread of this virus.  o Limit your contact with others.  o Wear a simple mask to cover your cough.  o Wash your hands well and often.    Resources    [facility]: About COVID-19: [email or web address]    CDC: What to Do If You're Sick: www.cdc.gov/coronavirus/2019-ncov/about/steps-when-sick.html    CDC: Ending Home Isolation: " www.cdc.gov/coronavirus/2019-ncov/hcp/disposition-in-home-patients.html     CDC: Caring for Someone: www.cdc.gov/coronavirus/2019-ncov/if-you-are-sick/care-for-someone.html     [facility]: Interim Guidance for Hospital Discharge to Home: www.health.[facility].[place]/diseases/coronavirus/hcp/hospdischarge.pdf    [facility] clinical trials (COVID-19 research studies): clinicalaffairs.[facility]/umn-clinical-trials     Below are the COVID-19 hotlines at the [place] Department of Health ([facility]). Interpreters are available.   o For health questions: Call 584-889-7436 or 1-951.360.2485 (7 a.m. to 7 p.m.)  o For questions about schools and childcare: Call 785-249-9950 or 1-341.415.8057 (7 a.m. to 7 p.m.)                       Additional Information    Negative: [1] Major bleeding (e.g., actively dripping or spurting) AND [2] can't be stopped    Negative: Sounds like a life-threatening emergency to the triager    Negative: [1] Any break in skin from BITE (e.g., cut, puncture or scratch) AND[2] WILD animal at risk for RABIES (e.g., bat, raccoon, umanzor, skunk, coyote, other carnivores)    Negative: [1] Any break in skin from BITE (e.g., cut, puncture or scratch) AND[2] PET animial (e.g., dog, cat, or ferret) at risk for RABIES (e.g., sick, stray, unprovoked bite, developing country)    Negative: [1] Any break in skin from BITE (e.g., cut, puncture or scratch) AND[2] monkey    Negative: [1] EXPOSURE of non-intact skin (e.g., exposed person has dermatitis, abrasion, wound) AND[2] with animal BODY FLUID (e.g., saliva such as licking, blood, brain) AND[3] animal at high-risk for RABIES (e.g., bat, raccoon, umanzor, skunk, coyote, other carnivores)    Negative: [1] Cut (length > 1/8 inch or 3 mm) or skin tear AND [2] any animal    Negative: [1] Bleeding AND [2] won't stop after 10 minutes of direct pressure (using correct technique)    Negative: Description of bite sounds severe to the triager    Negative: [1] Puncture wound (hole through  the skin) AND [2] from a cat bite (or deep claw puncture wound)    Negative: [1] Puncture wound or small cut AND [2] on face    [1] Puncture wound or small cut AND [2] on hands or genitals    Protocols used: ANIMAL BITE-A-AH

## 2021-01-04 NOTE — ED TRIAGE NOTES
"right hand digit #2. Daughters dog swallowed a chicken bone and she \" tried to reach in and get the bone and she bit down on my finger in the process\" Dog not up to date on shots.     "

## 2021-01-04 NOTE — DISCHARGE INSTRUCTIONS
Antibiotics as prescribed.  Follow-up in clinic as needed.    Be seen if new, or worsening symptoms such as worsening swelling, redness, pus,

## 2021-01-15 ENCOUNTER — HEALTH MAINTENANCE LETTER (OUTPATIENT)
Age: 66
End: 2021-01-15

## 2021-03-18 ENCOUNTER — TRANSFERRED RECORDS (OUTPATIENT)
Dept: HEALTH INFORMATION MANAGEMENT | Facility: CLINIC | Age: 66
End: 2021-03-18

## 2021-03-18 LAB
ALT SERPL-CCNC: 43 IU/L (ref 0–44)
AST SERPL-CCNC: 16 U/L (ref 0–40)
CHOLEST SERPL-MCNC: 162 MG/DL
GLUCOSE SERPL-MCNC: 108 MG/DL (ref 61–99)
HDLC SERPL-MCNC: 69 MG/DL
LDLC SERPL CALC-MCNC: 65 MG/DL
TRIGL SERPL-MCNC: 137 MG/DL

## 2021-04-14 ENCOUNTER — OFFICE VISIT (OUTPATIENT)
Dept: FAMILY MEDICINE | Facility: CLINIC | Age: 66
End: 2021-04-14
Payer: MEDICARE

## 2021-04-14 VITALS
OXYGEN SATURATION: 98 % | WEIGHT: 205 LBS | HEART RATE: 62 BPM | TEMPERATURE: 97.2 F | DIASTOLIC BLOOD PRESSURE: 82 MMHG | HEIGHT: 70 IN | BODY MASS INDEX: 29.35 KG/M2 | SYSTOLIC BLOOD PRESSURE: 114 MMHG

## 2021-04-14 DIAGNOSIS — N18.1 CHRONIC KIDNEY DISEASE, STAGE 1, NORMAL OR INCREASED GFR: ICD-10-CM

## 2021-04-14 DIAGNOSIS — E55.9 VITAMIN D DEFICIENCY: ICD-10-CM

## 2021-04-14 DIAGNOSIS — I80.299 PHLEBITIS AND THROMBOPHLEBITIS OF OTHER DEEP VESSELS OF UNSPECIFIED LOWER EXTREMITY (H): Primary | ICD-10-CM

## 2021-04-14 LAB
ANION GAP SERPL CALCULATED.3IONS-SCNC: 3 MMOL/L (ref 3–14)
BUN SERPL-MCNC: 13 MG/DL (ref 7–30)
CALCIUM SERPL-MCNC: 9 MG/DL (ref 8.5–10.1)
CHLORIDE SERPL-SCNC: 108 MMOL/L (ref 94–109)
CO2 SERPL-SCNC: 28 MMOL/L (ref 20–32)
CREAT SERPL-MCNC: 0.93 MG/DL (ref 0.52–1.04)
GFR SERPL CREATININE-BSD FRML MDRD: 63 ML/MIN/{1.73_M2}
GLUCOSE SERPL-MCNC: 91 MG/DL (ref 70–99)
POTASSIUM SERPL-SCNC: 3.8 MMOL/L (ref 3.4–5.3)
SODIUM SERPL-SCNC: 139 MMOL/L (ref 133–144)

## 2021-04-14 PROCEDURE — 36415 COLL VENOUS BLD VENIPUNCTURE: CPT | Performed by: FAMILY MEDICINE

## 2021-04-14 PROCEDURE — 99213 OFFICE O/P EST LOW 20 MIN: CPT | Performed by: FAMILY MEDICINE

## 2021-04-14 PROCEDURE — 80048 BASIC METABOLIC PNL TOTAL CA: CPT | Performed by: FAMILY MEDICINE

## 2021-04-14 PROCEDURE — 82306 VITAMIN D 25 HYDROXY: CPT | Performed by: FAMILY MEDICINE

## 2021-04-14 ASSESSMENT — MIFFLIN-ST. JEOR: SCORE: 1536.15

## 2021-04-14 NOTE — PATIENT INSTRUCTIONS
Thank you for choosing [facility].  You may be receiving an email and/or telephone survey request from [facility] Customer Experience regarding your visit today.  Please take a few minutes to respond to the survey to let us know how we are doing.      If you have questions or concerns, please contact us via [employer] or you can contact your care team at [phone].    Our Clinic hours are:  Monday 6:40 am  to 7:00 pm  Tuesday -Friday 6:40 am to 5:00 pm    The [place] outpatient lab hours are:  Monday - Friday 6:10 am to 4:45 pm  Saturdays 7:00 am to 11:00 am  Appointments are required, call [phone]    If you have clinical questions after hours or would like to schedule an appointment,  call the clinic at [phone].

## 2021-04-15 LAB — DEPRECATED CALCIDIOL+CALCIFEROL SERPL-MC: 12 UG/L (ref 20–75)

## 2021-04-16 DIAGNOSIS — E55.9 VITAMIN D DEFICIENCY: Primary | ICD-10-CM

## 2021-04-16 NOTE — RESULT ENCOUNTER NOTE
Please inform patient that test result was within normal parameters except that the vitamin d levels were really low. I will fax replacement to the pharmacy for the patient. She needs to recheck in three months. Her glucose was also slightly high. Recommend keeping an eye on the glucose.  Thank you.     Verito Ashby M.D.

## 2021-04-22 ENCOUNTER — HOSPITAL ENCOUNTER (OUTPATIENT)
Dept: MAMMOGRAPHY | Facility: CLINIC | Age: 66
Discharge: HOME | End: 2021-04-22
Attending: FAMILY MEDICINE | Admitting: FAMILY MEDICINE
Payer: MEDICARE

## 2021-04-22 DIAGNOSIS — Z12.31 VISIT FOR SCREENING MAMMOGRAM: ICD-10-CM

## 2021-04-22 PROCEDURE — 77067 SCR MAMMO BI INCL CAD: CPT

## 2021-05-24 ENCOUNTER — RECORDS - HEALTHEAST (OUTPATIENT)
Dept: ADMINISTRATIVE | Facility: CLINIC | Age: 66
End: 2021-05-24

## 2021-05-25 ENCOUNTER — RECORDS - HEALTHEAST (OUTPATIENT)
Dept: ADMINISTRATIVE | Facility: CLINIC | Age: 66
End: 2021-05-25

## 2021-05-26 ENCOUNTER — RECORDS - HEALTHEAST (OUTPATIENT)
Dept: ADMINISTRATIVE | Facility: CLINIC | Age: 66
End: 2021-05-26

## 2021-05-27 ENCOUNTER — RECORDS - HEALTHEAST (OUTPATIENT)
Dept: ADMINISTRATIVE | Facility: CLINIC | Age: 66
End: 2021-05-27

## 2021-05-28 ENCOUNTER — RECORDS - HEALTHEAST (OUTPATIENT)
Dept: ADMINISTRATIVE | Facility: CLINIC | Age: 66
End: 2021-05-28

## 2021-05-29 ENCOUNTER — RECORDS - HEALTHEAST (OUTPATIENT)
Dept: ADMINISTRATIVE | Facility: CLINIC | Age: 66
End: 2021-05-29

## 2021-06-01 VITALS — WEIGHT: 184.56 LBS | BODY MASS INDEX: 27.33 KG/M2 | HEIGHT: 69 IN

## 2021-06-19 NOTE — ANESTHESIA PROCEDURE NOTES
Spinal Block    Patient location during procedure: OR  Start time: 8/20/2018 12:44 PM  End time: 8/20/2018 12:51 PM    Staffing:  Performing  Anesthesiologist: MARYAM SCOTT    Preanesthetic Checklist  Completed: patient identified, risks, benefits, and alternatives discussed, timeout performed, consent obtained, airway assessed, oxygen available, suction available, emergency drugs available and hand hygiene performed  Spinal Block  Patient position: sitting  Prep: ChloraPrep and site prepped and draped  Patient monitoring: heart rate, continuous pulse ox and blood pressure  Approach: left paramedian  Location: L3-4  Injection technique: single-shot  Needle type: pencil-tip     Assessment  Sensory level: T10

## 2021-06-19 NOTE — ANESTHESIA PROCEDURE NOTES
Peripheral Block    Patient location during procedure: pre-op  Start time: 8/20/2018 10:48 AM  End time: 8/20/2018 11:51 AM  post-op analgesia per surgeon order as noted in medical record  Staffing:  Performing  Anesthesiologist: MARYAM SCOTT  Preanesthetic Checklist  Completed: patient identified, site marked, risks, benefits, and alternatives discussed, timeout performed, consent obtained, airway assessed, oxygen available, suction available, emergency drugs available and hand hygiene performed  Peripheral Block  Block type: other, tibial  Patient position: left lateral decubitus  Patient monitoring: cardiac monitor, continuous pulse oximetry, heart rate and blood pressure  Laterality: right  Injection technique: ultrasound guided    Ultrasound used to visualize needle placement in proximity to nerve being blocked: yes   Permanent ultrasound image captured for medical record  Sterile gel and probe cover used for ultrasound.    Needle  Needle type: Stimuplex   Needle gauge: 20G  Needle length: 4 in  no peripheral nerve catheter placed  Assessment  Injection assessment: no difficulty with injection, negative aspiration for heme, no paresthesia on injection and incremental injection

## 2021-06-19 NOTE — ANESTHESIA CARE TRANSFER NOTE
Last vitals:   Vitals:    08/20/18 1447   BP: 109/58   Pulse: 80   Resp: 14   Temp: 36.2  C (97.2  F)   SpO2: 100%     Patient's level of consciousness is drowsy  Spontaneous respirations: yes  Maintains airway independently: yes  Dentition unchanged: yes  Oropharynx: oropharynx clear of all foreign objects    QCDR Measures:  ASA# 20 - Surgical Safety Checklist: WHO surgical safety checklist completed prior to induction  PQRS# 430 - Adult PONV Prevention: 4558F - Pt received => 2 anti-emetic agents (different classes) preop & intraop  ASA# 8 - Peds PONV Prevention: NA - Not pediatric patient, not GA or 2 or more risk factors NOT present  PQRS# 424 - Annabelle-op Temp Management: 4559F - At least one body temp DOCUMENTED => 35.5C or 95.9F within required timeframe  PQRS# 426 - PACU Transfer Protocol: - Transfer of care checklist used  ASA# 14 - Acute Post-op Pain: ASA14B - Patient did NOT experience pain >= 7 out of 10

## 2021-06-19 NOTE — ANESTHESIA PROCEDURE NOTES
Peripheral Block    Patient location during procedure: pre-op  Start time: 8/20/2018 11:52 AM  End time: 8/20/2018 11:55 AM  post-op analgesia per surgeon order as noted in medical record  Staffing:  Performing  Anesthesiologist: MARYAM SCOTT  Preanesthetic Checklist  Completed: patient identified, site marked, risks, benefits, and alternatives discussed, timeout performed, consent obtained, airway assessed, oxygen available, suction available, emergency drugs available and hand hygiene performed  Peripheral Block  Block type: saphenous, adductor canal block  Prep: ChloraPrep  Patient position: supine  Patient monitoring: cardiac monitor, continuous pulse oximetry, heart rate and blood pressure  Laterality: right  Injection technique: ultrasound guided    Ultrasound used to visualize needle placement in proximity to nerve being blocked: yes   Permanent ultrasound image captured for medical record  Sterile gel and probe cover used for ultrasound.    Needle  Needle type: Stimuplex   Needle gauge: 20G  Needle length: 4 in  no peripheral nerve catheter placed  Assessment  Injection assessment: no difficulty with injection, negative aspiration for heme, no paresthesia on injection and incremental injection

## 2021-06-19 NOTE — ANESTHESIA PREPROCEDURE EVALUATION
Anesthesia Evaluation      Patient summary reviewed     Airway   Mallampati: II   Pulmonary - negative ROS and normal exam                          Cardiovascular - normal exam  ECG reviewed        Neuro/Psych    (+) neuromuscular disease,      Endo/Other       GI/Hepatic/Renal    (+) GERD well controlled,   chronic renal disease,     Comments: nephrolithiasis     Other findings: Hb 14.1      Dental - normal exam                        Anesthesia Plan  Planned anesthetic: spinal    ASA 2     Anesthetic plan and risks discussed with: patient    Post-op plan: routine recovery

## 2021-06-19 NOTE — ANESTHESIA POSTPROCEDURE EVALUATION
Patient: Ina Otoole  RIGHT TOTAL KNEE ARTHROPLASTY COMPUTER ASSIST  Anesthesia type: spinal    Patient location: PACU  Last vitals:   Vitals:    08/20/18 1530   BP: 105/60   Pulse: 70   Resp: 12   Temp:    SpO2: 100%     Post vital signs: stable  Level of consciousness: awake and responds to simple questions  Post-anesthesia pain: pain controlled  Post-anesthesia nausea and vomiting: no  Pulmonary: unassisted, return to baseline  Cardiovascular: stable and blood pressure at baseline  Hydration: adequate  Anesthetic events: no    QCDR Measures:  ASA# 11 - Annabelle-op Cardiac Arrest: ASA11B - Patient did NOT experience unanticipated cardiac arrest  ASA# 12 - Annabelle-op Mortality Rate: ASA12B - Patient did NOT die  ASA# 13 - PACU Re-Intubation Rate: ASA13B - Patient did NOT require a new airway mgmt  ASA# 10 - Composite Anes Safety: ASA10A - No serious adverse event    Additional Notes:

## 2021-07-15 ENCOUNTER — LAB (OUTPATIENT)
Dept: LAB | Facility: CLINIC | Age: 66
End: 2021-07-15
Payer: MEDICARE

## 2021-07-15 DIAGNOSIS — E55.9 VITAMIN D DEFICIENCY: ICD-10-CM

## 2021-07-15 LAB — DEPRECATED CALCIDIOL+CALCIFEROL SERPL-MC: 30 UG/L (ref 20–75)

## 2021-07-15 PROCEDURE — 36415 COLL VENOUS BLD VENIPUNCTURE: CPT

## 2021-07-15 PROCEDURE — 82306 VITAMIN D 25 HYDROXY: CPT

## 2021-09-02 ENCOUNTER — VIRTUAL VISIT (OUTPATIENT)
Dept: FAMILY MEDICINE | Facility: CLINIC | Age: 66
End: 2021-09-02
Payer: MEDICARE

## 2021-09-02 ENCOUNTER — ANCILLARY PROCEDURE (OUTPATIENT)
Dept: GENERAL RADIOLOGY | Facility: CLINIC | Age: 66
End: 2021-09-02
Attending: FAMILY MEDICINE
Payer: MEDICARE

## 2021-09-02 DIAGNOSIS — M54.9 UPPER BACK PAIN: ICD-10-CM

## 2021-09-02 DIAGNOSIS — M54.9 UPPER BACK PAIN: Primary | ICD-10-CM

## 2021-09-02 PROCEDURE — 72070 X-RAY EXAM THORAC SPINE 2VWS: CPT | Performed by: RADIOLOGY

## 2021-09-02 PROCEDURE — 99441 PR PHYSICIAN TELEPHONE EVALUATION 5-10 MIN: CPT | Mod: 95 | Performed by: FAMILY MEDICINE

## 2021-09-02 RX ORDER — GABAPENTIN 100 MG/1
100-300 CAPSULE ORAL
Qty: 60 CAPSULE | Refills: 0 | Status: SHIPPED | OUTPATIENT
Start: 2021-09-02 | End: 2021-10-28

## 2021-09-02 RX ORDER — METHYLPREDNISOLONE 4 MG
TABLET, DOSE PACK ORAL
Qty: 21 TABLET | Refills: 0 | Status: SHIPPED | OUTPATIENT
Start: 2021-09-02 | End: 2021-10-20

## 2021-09-02 NOTE — PROGRESS NOTES
Kirti is a 66 year old who is being evaluated via a billable video visit.      How would you like to obtain your AVS? MyChart  If the video visit is dropped, the invitation should be resent by: Text to cell phone: [phone]  Will anyone else be joining your video visit? No    Telephone encounter.   Time started 3:05 PM  Telephone End Time:3:11 PM    Assessment & Plan     Upper back pain  X-rays did not show any fractures. Recommend seeing spine specialist. Medrol pack and gabapentin faxed to pharmacy.   - XR Thoracic Spine 2 Views; Future  - methylPREDNISolone (MEDROL DOSEPAK) 4 MG tablet therapy pack; Follow Package Directions  - gabapentin (NEURONTIN) 100 MG capsule; Take 1-3 capsules (100-300 mg) by mouth nightly as needed (Upper back pain)  - Orthopedic  Referral; Future        FUTURE APPOINTMENTS:       - Follow-up visit in one month or sooner as needed.    Return in about 4 weeks (around 9/30/2021) for Follow up.    Verito Ashby MD  [facility]    Subjective   Kirti is a 66 year old who presents for the following health issues   HPI     64 yr old female here for upper back pain. This pain has been ongoing for about six weeks. She says she did not have any injury but pain has been increasing in intensity. Patient says that she is having a hard time sleeping. She reports no numbness or tingling. She denies any decrease in range of motion in her shoulder. Pain does radiate into her right shoulder blade area. Pain is constant. She has tied stretching and icing with over the counter medication with no relief.    Pain History:  When did you first notice your pain? - More than 6 weeks   Have you seen this provider for your pain in the past?   Yes   Where in your body do you have pain? Neck and lower spine (L3 and L4) more on the right side of the spine  Are you seeing anyone else for your pain? No    56}      Chronic Pain Follow Up:    Location of pain:  Neck and lower  spine (L3 and L4) more on the right side of the spine  Analgesia/pain control: warm compresses, otc pain relief, chiropractors, ice packs - no reilef. Taking tincture CBD to help pain but doesn't help either.   - Recent changes:  none    - Overall control: Inadequate pain control    - Current treatments: warm compresses, otc pain relief, chiropractors, ice packs - no reilef. Taking tincture CBD to help pain but doesn't help either.   Adherence:     - Do you ever take more pain medicine than prescribed? No     PDMP Review     None        Last CSA Agreement:   Patient-Level CSA:    There are no patient-level csa.       Last UDS:     :[id number]}      Review of Systems   Constitutional, HEENT, cardiovascular, pulmonary, gi and gu systems are negative, except as otherwise noted.      Objective           Vitals:  No vitals were obtained today due to virtual visit.    Physical Exam   GENERAL: Healthy, alert and no distress  RESP: No audible wheeze, cough, or visible cyanosis.  No visible retractions or increased work of breathing.    PSYCH: Mentation appears normal, affect normal/bright, judgement and insight intact, normal speech and appearance well-groomed.    XR Thoracic Spine 2 Views    Result Date: 9/2/2021  XR THORACIC SPINE 2 VIEWS 9/2/2021 2:23 PM INDICATION: patient c/o of upper back pain ongoing for over a month. Described as a sharp pain and radiating into the shoulder blade area; Upper back pain COMPARISON: Chest x-ray June 17, 2018.     IMPRESSION: Mild thoracic kyphosis. The vertebral bodies of the thoracic spine otherwise have normal stature and alignment without evidence of compression fracture. Minimal degenerative endplate changes within the thoracic spine. Minimal anterior marginal osteophytes within the thoracic spine. The disc spaces are well-maintained. No other significant degenerative changes. The partially imaged lungs are unremarkable. Soft tissues unremarkable.             Telephone-Visit  Details        Telephone End Time:3:11 PM

## 2021-09-05 ENCOUNTER — HEALTH MAINTENANCE LETTER (OUTPATIENT)
Age: 66
End: 2021-09-05

## 2021-09-09 ENCOUNTER — OFFICE VISIT (OUTPATIENT)
Dept: PALLIATIVE MEDICINE | Facility: CLINIC | Age: 66
End: 2021-09-09
Payer: MEDICARE

## 2021-09-09 VITALS — HEART RATE: 79 BPM | DIASTOLIC BLOOD PRESSURE: 76 MMHG | SYSTOLIC BLOOD PRESSURE: 110 MMHG

## 2021-09-09 DIAGNOSIS — M54.9 UPPER BACK PAIN: ICD-10-CM

## 2021-09-09 DIAGNOSIS — M54.12 CERVICAL RADICULOPATHY: Primary | ICD-10-CM

## 2021-09-09 PROCEDURE — 99204 OFFICE O/P NEW MOD 45 MIN: CPT | Performed by: STUDENT IN AN ORGANIZED HEALTH CARE EDUCATION/TRAINING PROGRAM

## 2021-09-09 ASSESSMENT — PAIN SCALES - GENERAL: PAINLEVEL: EXTREME PAIN (8)

## 2021-09-09 NOTE — PATIENT INSTRUCTIONS
I think your upper back and right arm pain is most likely due to a pinched nerve in your neck. I think it is less likely that it is a pinched nerve in your upper back    For this we will:     1. Get an MRI of both your upper/mid-back and neck.     Your next steps:  1. [facility] [place] [phone] - please call to schedule appointment    Follow up:   - I will call you with the results of your MRI , then we can talk about the type of injection that will be best    Barbie Winslow MD  [facility]     ----------------------------------------------------------------  Clinic Number:  [phone]     Call with any questions about your care and for scheduling assistance.     Calls are returned Monday through Friday between 8 AM and 4:30 PM. We usually get back to you within 2 business days depending on the issue/request.    If we are prescribing your medications:    For opioid medication refills, call the clinic or send a Decalog message 7 days in advance.  Please include:    Name of requested medication    Name of the pharmacy.    For non-opioid medications, call your pharmacy directly to request a refill. Please allow 3-4 days to be processed.     Per [place] State Law:    All controlled substance prescriptions must be filled within 30 days of being written.      For those controlled substances allowing refills, pickup must occur within 30 days of last fill.      We believe regular attendance is key to your success in our program!      Any time you are unable to keep your appointment we ask that you call us at least 24 hours in advance to cancel.This will allow us to offer the appointment time to another patient.     Multiple missed appointments may lead to dismissal from the clinic.

## 2021-09-09 NOTE — PROGRESS NOTES
Ina Otoole  :  1955  DOS: 2021  MRN: [id number]    Medical Spine Clinic Visit    PCP: Verito Ashby     Ina Otoole is a 66 year old female with a history of myalgia and myositis, s/p L TKA, s/p R meniscus repair, IBS referred by Verito Ashby for: mid-back pain, specifically non-invasive treatment    Pain is at T2 level at midline, can radiate to the R shoulder and down the R arm and posterior aspect of upper arm. Her fingers can go numb, mostly thumb. This happens 2-3 times a day. It is aching, sharp, gnawing. The upper thoracic pain is constant but worsens with bilateral arm movement and bilateral head rotation. Sitting perfectly still for a prolonged period is alleviating.   Pain started 1st in August. Insidious onset.     Formerly had pain (20+ yrs) had pain at base of neck and midline at L3/4 (had been told this by chiropractor). Treatments were chiropractor - H, PO diclofenac but AE of GI, heat is H, rest is H. Also abdomen wrap for low back.     For this more recent pain, medrol dose puneet was helpful. Gabapentin 200-300 is NH. Heat and rest are helpful    Current pain medications:   - ibuprofen 400 mg twice daily - NH  - gabapentin 100-300 PRN  - Medrol dosepak    Other pertinent medications:  - none    Anticoagulants:  - ASA 81mg     Injections:   - TPI for FM    History of Surgery in the painful area:   - no    Social History: Retired.  Previously worked at a desk job.  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  Also spending time with her grandchildren.  Her grandchildren range in age from 13-6.    Past Medical History:   Diagnosis Date     Calculus of gallbladder with other cholecystitis, without mention of obstruction 2011     Past Surgical History:   Procedure Laterality Date     ARTHROSCOPY KNEE      left meniscus repair     ARTHROSCOPY KNEE RT/LT      left - meniscus repair     COLONOSCOPY N/A 2017    Procedure: COLONOSCOPY;  Colonoscopy  ;  Surgeon: Abhishek Escobedo  MD JOS;  Location: [facility]     COLONOSCOPY       KNEE SURGERY  9/2013    left knee replacement     LAPAROSCOPIC CHOLECYSTECTOMY  6/29/2011    Procedure:LAPAROSCOPIC CHOLECYSTECTOMY; Surgeon:SUMAYA ZAMORANO; Location:[facility]     LAPAROSCOPIC CHOLECYSTECTOMY       REPLACEMENT TOTAL KNEE Right 8/20/2018    Procedure: RIGHT TOTAL KNEE ARTHROPLASTY COMPUTER ASSIST;  Surgeon: Kevin Haji MD;  Location: [facility] OR;  Service:      STONE ANALYSIS  2011     THYROID BIOPSY       TOTAL KNEE ARTHROPLASTY Left      TUBAL LIGATION  1980     Family History   Problem Relation Age of Onset     Diabetes Mother      Hypertension Mother      Cerebrovascular Disease Mother      Cancer Mother         melanoma     Dementia Mother      Respiratory Father         copd     Cancer - colorectal Father      Bronchitis Father      Breast Cancer Maternal Grandmother      Diabetes Paternal Grandmother      Diabetes Brother      Hypertension Brother      Hypertension Sister      Multiple Sclerosis Sister      Neurologic Disorder Sister         ms     Other - See Comments Daughter         Transverse Myelitis     Asthma No family hx of      Prostate Cancer No family hx of        Objective  /76   Pulse 79       General: healthy, alert and in no distress      HEENT: no scleral icterus or conjunctival erythema     Skin: no suspicious lesions or rash. No jaundice.     CV: normal rate      Resp: normal respiratory effort without conversational dyspnea     Psych: normal mood and affect      Gait: nonantalgic, appropriate coordination and balance     Neuro: normal light touch sensory exam of the extremities. Motor strength as noted below     Low back exam:    THORACIC/LUMBAR SPINE  Inspection:    No gross deformity/asymmetry, scapular winging  Palpation:  She has substantial diffuse tenderness to palpation throughout the exam.  This includes tenderness of the areas that I touched during strength testing.  No clear areas of focal  tenderness  Range of Motion:     Lumbar flexion limited slightly by pain    Lumbar extension limited slightly by pain  Strength:    able to heel walk, able to toe walk, 5/5 - quadriceps, hamstrings, tibialis anterior, gastrocsoleus, and extensor hallicus longus  Special Tests:    Positive: Unable to complete the majority of the special tests, as patient developed a vasovagal episode during her physical exam.  This resolved spontaneously after several minutes of lying flat.  Negative: BILATERAL Spurling's    Cervical spine:  Range of motion within normal limits   Myofascial tenderness: Diffusely throughout body.  This includes cervical paraspinal muscles as well as bilateral upper trapezius muscles and.  By lateral parascapular muscles  No focal spinous process tenderness.   Spurling's negative bilaterally.    Shoulder exam: Unable to complete, as patient developed a vasovagal episode during the physical exam.    Carpal Tunnel tests:   Tinel's negative    Ulnar neuropathy tests:   Tinel's at the ulnar groove negative    Reflexes:  Biceps normal, symmetric  Brachioradialis  normal, symmetric,   Triceps  normal, symmetric       patellar (L3, L4) symmetric normal       achilles tendons (S1) symmetric normal    no ankle clonus bilaterally  Batres's negative bilaterally    Sensation:      grossly intact throughout lower extremities    Skin:       well perfused       capillary refill brisk    Radiology:  XR thoracic spine 9/2/2021    IMPRESSION:   Mild thoracic kyphosis. The vertebral bodies of the thoracic spine otherwise have normal stature and alignment without evidence of compression fracture. Minimal degenerative endplate changes within the thoracic spine. Minimal anterior marginal osteophytes within the thoracic spine. The disc spaces are well-maintained. No other significant degenerative changes. The partially imaged lungs are unremarkable. Soft tissues unremarkable.    Assessment:  1. Cervical radiculopathy    2.  Upper back pain      66-year-old woman with a history of chronic axial neck and low back pain, now with substantial pain midline at approximately the T3 level.  She has associated radiation of pain down the right arm as well as numbness and tingling in her fingers (primarily thumb).    Most likely cervical radiculopathy.    We discussed treatment options in depth. She declines medication management at this time.  She would like to proceed with an injection.  We will first need an MRI of her neck and thoracic back.   We discussed that I am somewhat hesitant to perform an injection given that her physical exam today provoked a vasovagal episode.  She states however that she think she would be able to tolerate the injection.  Discussed that I would like to treat her with IV fluids prior to the injection    Plan:  Discussed the assessment with the patient.  Consider physical therapy referral in the future  MRI of the cervical and thoracic spine  Likely cervical epidural steroid injection, pending results of MRIs.  Will need IV fluids prior to injection given vasovagal episode today  Follow up: I will call with results of MRI    BILLING TIME DOCUMENTATION:   The total TIME spent on this patient on the date of the encounter/appointment was 40 minutes.      TOTAL TIME includes:   Time spent preparing to see the patient (reviewing records and tests) - 7 min  Time spent face to face (or over the phone) with the patient - 26 min  Time spent ordering tests, medications, procedures and referrals - 0 min  Time spent Referring and communicating with other healthcare professionals - 0 min  Time spent documenting clinical information in Epic - 7 min     Barbie Winslow MD  [facility]     Disclaimer: This note consists of symbols derived from keyboarding, dictation and/or voice recognition software. As a result, there may be errors in the script that have gone undetected. Please consider this when  interpreting information found in this chart.

## 2021-09-13 ENCOUNTER — HOSPITAL ENCOUNTER (OUTPATIENT)
Dept: MRI IMAGING | Facility: CLINIC | Age: 66
End: 2021-09-13
Attending: STUDENT IN AN ORGANIZED HEALTH CARE EDUCATION/TRAINING PROGRAM
Payer: MEDICARE

## 2021-09-13 DIAGNOSIS — M54.9 UPPER BACK PAIN: ICD-10-CM

## 2021-09-13 DIAGNOSIS — M54.12 CERVICAL RADICULOPATHY: ICD-10-CM

## 2021-09-13 PROCEDURE — G1004 CDSM NDSC: HCPCS

## 2021-09-13 PROCEDURE — 72146 MRI CHEST SPINE W/O DYE: CPT | Mod: MG

## 2021-09-13 PROCEDURE — 72141 MRI NECK SPINE W/O DYE: CPT | Mod: ME

## 2021-09-14 DIAGNOSIS — M54.12 CERVICAL RADICULOPATHY: Primary | ICD-10-CM

## 2021-09-16 ENCOUNTER — TELEPHONE (OUTPATIENT)
Dept: PALLIATIVE MEDICINE | Facility: CLINIC | Age: 66
End: 2021-09-16

## 2021-09-16 ENCOUNTER — MYC MEDICAL ADVICE (OUTPATIENT)
Dept: PALLIATIVE MEDICINE | Facility: CLINIC | Age: 66
End: 2021-09-16

## 2021-09-16 DIAGNOSIS — Z20.822 ENCOUNTER FOR LABORATORY TESTING FOR COVID-19 VIRUS: Primary | ICD-10-CM

## 2021-09-16 NOTE — TELEPHONE ENCOUNTER
Per patient myChart message:  From: Kirti Otoole      Created: 9/16/2021 12:30 PM      Thanks Debora, I do have one question. On 9-28 I have a dentist appointment in which I take a pre-med for it because I have 2 fake knees. The pre-med is 2000 mg of amoxicillin one hour before dentist appt. I will reschedule my dentist appt so I can keep my appointment for the cortezone injection...my question is, when can I reschedule my dental appt for-how far away from injection do I need to reschedule my dental appointment for..thanks...        See the 9/16/21 mychart encounter.  Pt above message was sent to provider.    ESTHELA Cazares-BSN  [facility]

## 2021-09-16 NOTE — TELEPHONE ENCOUNTER
Screening Questions for Radiology Injections:    Injection to be done at which interventional clinic site? [facility]    If [facility] location, tell patient that this procedure requires a COVID-19 lab test be done within 4 days of the procedure. Would you still like to move forward with scheduling the procedure?  YES: ok   If YES, let patient know that someone will call them to schedule the COVID-19 test and that they will only receive a call back if the result is positive. Route to nursing to enter order.     Instruct patient to arrive as directed prior to the scheduled appointment time:    [place] minutes before      [place]: 30 minutes before; if IV needed 1 hour before     Procedure ordered by Goldy    Procedure ordered? C7/T1 interlaminar epidural steroid injection. Right paramedian approach. Needs 500 mL IVF prior to procedure       Transforaminal Cervical TYRESE -  [place] does NOT have providers that do these- [facility] and [facility] do have providers that do    As a reminder, receiving steroids can decrease your body's ability to fight infection.   Would you still like to move forward with scheduling the injection?  Yes    What insurance would patient like us to bill for this procedure? Medicare/Cign[employer]      Worker's comp or MVA (motor vehicle accident) -Any injection DO NOT SCHEDULE and route to Katerin Lockhart.      [employer] insurance - For SI joint injections, DO NOT SCHEDULE and route Katerin Lockhart.       ALL BCBS, [employer] and [employer]-Route to Katerin for review DO NOT SCHEDULE      IF SCHEDULING IN [place] AND NEEDS A PA, IT IS OKAY TO SCHEDULE. [place] HANDLES THEIR OWN PA'S AFTER THE PATIENT IS SCHEDULED. PLEASE SCHEDULE AT LEAST 1 WEEK OUT SO A PA CAN BE OBTAINED.    Any chance of pregnancy? NO   If YES, do NOT schedule and route to RN pool    Is an  needed? No     Patient has a drive home? (mandatory) YES: ok    Is patient taking any blood thinners (i.e.  plavix, coumadin, jantoven, warfarin, heparin, pradaxa or dabigatran, etc)? No   If hold needed, do NOT schedule, route to RN pool     Is patient taking any aspirin products (includes Excedrin and Fiorinal)? No     If more than 325mg/day, OK to schedule; Instruct pt to decrease to less than 325 mg for 7 days AND route to RN pool    For CERVICAL procedures, hold all aspirin products for 6 days.     Tell pt that if aspirin product is not held for 6 days, the procedure WILL BE cancelled.      Does the patient have a bleeding or clotting disorder? No     If YES, okay to schedule AND route to RN nurse pool    For any patients with platelet count <100, must be forwarded to provider    Any allergies to contrast dye, iodine, shellfish, or numbing and steroid medications? No    If YES, add allergy information to appointment notes AND route to the RN pool     If TYRESE and Contrast Dye / Iodine Allergy? DO NOT SCHEDULE, route to RN pool    Allergies: Sulfa drugs     Is patient diabetic?  No  If YES, instruct them to bring their glucometer.    Does patient have an active infection or treated for one within the past week? No     Is patient currently taking any antibiotics?  No     For patients on chronic, preventative, or prophylactic antibiotics, procedures may be scheduled.     For patients on antibiotics for active or recent infection:antibiotic course must have been completed for 4 days    Is patient currently taking any steroid medications? (i.e. Prednisone, Medrol)  No     For patients on steroid medications, course must have been completed for 4 days    Is patient actively being treated for cancer or immunocompromised? No  If YES, do NOT schedule and route to RN pool     Are you able to get on and off an exam table with minimal or no assistance? Yes  If NO, do NOT schedule and route to RN pool    Are you able to roll over and lay on your stomach with minimal or no assistance? Yes  If NO, do NOT schedule and route to RN  pool     Has the patient had a flu shot or any other vaccinations within 7 days before or after the procedure.  No     Have you recently had a COVID vaccine or have plans to get it in the near future? No    If yes, explain that for the vaccine to work best they need to:       wait 1 week before and 1 week after getting Vaccine #1    wait 1 week before and 2 weeks after getting Vaccine #2    If patient has concerns about the timing, send to RN pool     Does patient have an MRI/CT?  YES: MRI  Check Procedure Scheduling Grid to see if required.      Was the MRI done within the last 3 years?  Yes    If yes, where was the MRI done i.e.[facility], [facility], [place] [facility], [facility] etc? [facility]      If no, do not schedule and route to RN pool    If MRI was not done at [place], [facility] or [facility] do NOT schedule and route to [facility].      If pt has an imaging disc, the injection MAY be scheduled but pt has to bring disc to appt.     If they show up without the disc the injection cannot be done    Procedure Specific Instructions:      If celiac plexus block, informed patient NPO for 6 hours and that it is okay to take medications with sips of water, especially blood pressure medications  Not Applicable         If this is for a cervical procedure, informed patient that aspirin needs to be held for 6 days.   YES: ok      If IV needed:    Do not schedule procedures requiring IV placement in the first appointment of the day or first appointment after lunch. Do NOT schedule at 0745, 0815 or 1245. ok    Instructed pt to arrive 30 minutes early for IV start if required. (Check Procedure Scheduling Grid)  YES: ok    Reminders:      If you are started on any steroids or antibiotics between now and your appointment, you must contact us because the procedure may need to be cancelled.  Yes      For all procedures except radiofrequency ablations (RFAs) and spinal cord stimulator (SCS) trials, informed patient:    IV  sedation is not provided for this procedure.  If you feel that an oral anti-anxiety medication is needed, you can discuss this further with your referring provider or primary care provider.  The Pain Clinic provider will discuss specifics of what the procedure includes at your appointment.  Most procedures last 10-20 minutes.  We use numbing medications to help with any discomfort during the procedure.  NO      For patients 85 or older we recommend having an adult stay w/ them for the remainder of the day.   ok    Does the patient have any questions?  NO  Helen Phelps  [facility]

## 2021-09-16 NOTE — TELEPHONE ENCOUNTER
[employer] message sent to Pt     Dinesh Cotto,     I would like you to wait at least 2 weeks after your neck injection to have your dental appointment.     Barbie Winslow MD  [facility]

## 2021-09-16 NOTE — TELEPHONE ENCOUNTER
COVID test ordered.  _________________________________    Mychart sent to pt:  From: Debora Nguyen RN      Created: 9/16/2021 11:58 AM      Dinesh Cotto,  Just a reminder.  Be sure to arrive 60 minutes early for your 9/30/21 injection so there is time to start an IV and IV fluids.  So arrive at 0845.     A cervical epidural steroid injection  has been scheduled for 9/30/21 @ 0945. Arrive at 0915 for IV fluids.  If you are taking aspirin it needs to be  held for 6 days-starting on 9/24/21.     A  is needed.  Okay to eat and drink before procedure  If you develop and infections and/or are on antibiotics be sure to call us as the injection will need to be rescheduled.     Location:         [address] [address]  ------  Appt line:  [phone]      Let us know if you have any questions.     Debora Nguyen RN-BSN  [facility]

## 2021-09-16 NOTE — TELEPHONE ENCOUNTER
Per patient [employer] message:  From: Kirti Otoole      Created: 9/16/2021 12:30 PM      Thanks Debora, I do have one question. On 9-28 I have a dentist appointment in which I take a pre-med for it because I have 2 fake knees. The pre-med is 2000 mg of amoxicillin one hour before dentist appt. I will reschedule my dentist appt so I can keep my appointment for the cortezone injection...my question is, when can I reschedule my dental appt for-how far away from injection do I need to reschedule my dental appointment for..thanks...        9/28/21: cervical epidural steroid injection.    Routed to provider.     Debora RN-BSN  [facility]

## 2021-09-26 ENCOUNTER — LAB (OUTPATIENT)
Dept: FAMILY MEDICINE | Facility: CLINIC | Age: 66
End: 2021-09-26
Attending: STUDENT IN AN ORGANIZED HEALTH CARE EDUCATION/TRAINING PROGRAM
Payer: MEDICARE

## 2021-09-26 DIAGNOSIS — Z20.822 ENCOUNTER FOR LABORATORY TESTING FOR COVID-19 VIRUS: ICD-10-CM

## 2021-09-26 PROCEDURE — U0003 INFECTIOUS AGENT DETECTION BY NUCLEIC ACID (DNA OR RNA); SEVERE ACUTE RESPIRATORY SYNDROME CORONAVIRUS 2 (SARS-COV-2) (CORONAVIRUS DISEASE [COVID-19]), AMPLIFIED PROBE TECHNIQUE, MAKING USE OF HIGH THROUGHPUT TECHNOLOGIES AS DESCRIBED BY CMS-2020-01-R: HCPCS

## 2021-09-26 PROCEDURE — U0005 INFEC AGEN DETEC AMPLI PROBE: HCPCS

## 2021-09-26 PROCEDURE — 99207 PR NO CHARGE LOS: CPT

## 2021-09-27 LAB — SARS-COV-2 RNA RESP QL NAA+PROBE: NEGATIVE

## 2021-09-30 ENCOUNTER — HOSPITAL ENCOUNTER (OUTPATIENT)
Dept: PALLIATIVE MEDICINE | Facility: CLINIC | Age: 66
Discharge: HOME | End: 2021-09-30
Attending: STUDENT IN AN ORGANIZED HEALTH CARE EDUCATION/TRAINING PROGRAM | Admitting: STUDENT IN AN ORGANIZED HEALTH CARE EDUCATION/TRAINING PROGRAM
Payer: MEDICARE

## 2021-09-30 VITALS
SYSTOLIC BLOOD PRESSURE: 147 MMHG | DIASTOLIC BLOOD PRESSURE: 82 MMHG | TEMPERATURE: 96.8 F | RESPIRATION RATE: 16 BRPM | HEART RATE: 67 BPM

## 2021-09-30 DIAGNOSIS — M54.12 CERVICAL RADICULOPATHY: ICD-10-CM

## 2021-09-30 PROCEDURE — 250N000009 HC RX 250: Performed by: STUDENT IN AN ORGANIZED HEALTH CARE EDUCATION/TRAINING PROGRAM

## 2021-09-30 PROCEDURE — 255N000002 HC RX 255 OP 636: Performed by: STUDENT IN AN ORGANIZED HEALTH CARE EDUCATION/TRAINING PROGRAM

## 2021-09-30 PROCEDURE — 258N000003 HC RX IP 258 OP 636: Performed by: STUDENT IN AN ORGANIZED HEALTH CARE EDUCATION/TRAINING PROGRAM

## 2021-09-30 PROCEDURE — 62321 NJX INTERLAMINAR CRV/THRC: CPT | Performed by: STUDENT IN AN ORGANIZED HEALTH CARE EDUCATION/TRAINING PROGRAM

## 2021-09-30 PROCEDURE — 250N000011 HC RX IP 250 OP 636: Performed by: STUDENT IN AN ORGANIZED HEALTH CARE EDUCATION/TRAINING PROGRAM

## 2021-09-30 RX ORDER — LIDOCAINE HYDROCHLORIDE 10 MG/ML
5 INJECTION, SOLUTION EPIDURAL; INFILTRATION; INTRACAUDAL; PERINEURAL ONCE
Status: COMPLETED | OUTPATIENT
Start: 2021-09-30 | End: 2021-09-30

## 2021-09-30 RX ORDER — IOPAMIDOL 408 MG/ML
10 INJECTION, SOLUTION INTRATHECAL ONCE
Status: COMPLETED | OUTPATIENT
Start: 2021-09-30 | End: 2021-09-30

## 2021-09-30 RX ORDER — DEXAMETHASONE SODIUM PHOSPHATE 10 MG/ML
10 INJECTION, SOLUTION INTRAMUSCULAR; INTRAVENOUS ONCE
Status: COMPLETED | OUTPATIENT
Start: 2021-09-30 | End: 2021-09-30

## 2021-09-30 RX ADMIN — DEXAMETHASONE SODIUM PHOSPHATE 10 MG: 10 INJECTION, SOLUTION INTRAMUSCULAR; INTRAVENOUS at 09:51

## 2021-09-30 RX ADMIN — LIDOCAINE HYDROCHLORIDE 3 ML: 10 INJECTION, SOLUTION EPIDURAL; INFILTRATION; INTRACAUDAL; PERINEURAL at 09:52

## 2021-09-30 RX ADMIN — SODIUM CHLORIDE 500 ML: 9 INJECTION, SOLUTION INTRAVENOUS at 08:52

## 2021-09-30 RX ADMIN — IOPAMIDOL 1 ML: 408 INJECTION, SOLUTION INTRATHECAL at 09:52

## 2021-09-30 NOTE — DISCHARGE INSTRUCTIONS
[facility]   Procedure Discharge Instructions    Today you saw:    Dr. Barbie Winslow    You had a:   C7/T1 Interlaminar Epidural Steroid Injection    Medications used:  Lidocaine   Isovue   Dexamethasone   Normal saline          Do not drive for 6 hours. The effect of the local anesthetic could slow your reflexes.     You may resume your regular activities after 24 hours    Avoid strenuous activity for the first 24 hours    You may shower, however avoid swimming, tub baths or hot tubs for 24 hours following your procedure    You may have a mild to moderate increase in pain for several days following the injection.    It may take up to 14 days for the steroid medication to start working although you may feel the effect as early as a few days after the procedure.       You may use ice packs for 10-15 minutes, 3 to 4 times a day at the injection site for comfort    Do not use heat to painful areas for 6 to 8 hours. This will give the local anesthetic time to wear off and prevent you from accidentally burning your skin.     Unless you have been directed to avoid the use of anti-inflammatory medications (NSAIDS), you may use medications such as ibuprofen, Aleve or Tylenol for pain control if needed.     If you were fasting, you may resume your normal diet and medications after the procedure    Possible side effects of steroids that you may experience include flushing, elevated blood pressure, increased appetite, mild headaches and restlessness.  All of these symptoms will get better with time.    If you experience any of the following, call the Pain Clinic during work hours (Mon-Friday 8-4:30 pm) at [phone] or the Provider Line after hours at [phone]:  -Fever over 100 degree F  -Swelling, bleeding, redness, drainage, warmth at the injection site  -Progressive weakness or numbness in your arms  -Unusual headache that is not relieved by Tylenol or other pain reliever  -Unusual  new onset of pain that is not improving

## 2021-09-30 NOTE — PROGRESS NOTES
[facility] - Procedure Note    Procedure performed: C7-T1 interlaminar epidural steroid injection with fluoroscopic guidance  Diagnosis: Cervical radiculitis/radiculopathy; Cervical spondylosis  : Barbie Winslow MD  Anesthesia: none    Indications: Ina Otoole is a 66 year old female who is well known to me from previous visits, here for cervical epidural steroid injection. The patient describes severe radiating right arm pain. The patient has been exhibiting symptoms consistent with cervical intraspinal inflammation and radiculopathy. Symptoms have been persistent, disabling, and intermittently severe. The patient reports minimal improvement with conservative treatment, including medications.    Cervical MRI was done on 9/13/2021 that showed   C5-C6: Moderate disc height loss. Symmetric diffuse disc bulge with  posterior endplate osteophytic ridging and bilateral uncovertebral  arthropathy. Mild bilateral facet arthropathy. Mild mass effect of the  ventral thecal sac with minimal indentation on the ventral aspect of  the cord. No significant spinal canal stenosis. Moderate bilateral  neural foraminal stenosis.    Allergies:      Allergies   Allergen Reactions     Sulfa Drugs Hives        Vitals:  BP (!) 147/82   Pulse 67   Temp 96.8  F (36  C) (Tympanic)   Resp 16     Review of Systems: The patient denies recent fever, chills, illness, use of antibiotics or anticoagulants. All other 10-point review of systems negative.     Procedure: The procedure and risks were explained, and informed written consent was obtained from the patient. Risks include but are not limited to: infection, bleeding, increased pain, and damage to soft tissue, nerve, muscle, and vasculature structures. After getting informed consent, patient was brought into the procedure suite and was placed in a prone position on the procedure table. A Pause for the Cause was performed. Patient was prepped and draped  in sterile fashion.     The C7-T1 interspace was identified with use of fluoroscopy in AP view. A 25-gauge, 1.5 inch needle was used to anesthetize the skin and subcutaneous tissue entry site with a total of 2 ml of 1% lidocaine. Under fluoroscopic visualization, a 22-gauge, 3.5 inch Tuohy epidural needle was slowly advanced towards the epidural space a few millimeters right of midline. The latter part of the needle advancement was guided with fluoroscopy in the contralateral oblique view. The epidural space was identified using loss of resistance technique. After negative aspiration for heme and cerebrospinal fluid, a total of 1 mL of Isovue 200 was injected to confirm needle placement. 9 mL of contrast was wasted. Epidurogram confirmed spread within the posterior epidural space. 2 ml of 10mg/ml of dexamethasone and 1 ml of preservative free normal saline was injected. The needle was removed.  Images were saved to PACS.    The patient tolerated the procedure well, and there was no evidence of procedural complications. No new sensory or motor deficits were noted following the procedure. The patient was stable and able to ambulate on discharge home. Post-procedure instructions were provided.     Pre-procedure pain score: 7/10 in the neck, 4/10 in the arm  Post-procedure pain score: 7/10 in the neck, 4/10 in the arm    Assessment/Plan: Ina Otoole is a 66 year old female s/p cervical interlaminar epidural steroid injection today for cervical spondylosis and radiculitis/radiculopathy.     1. Following today's procedure, the patient was advised to contact the [facility] for any of the following:   Fever, chills, or night sweats   New onset of pain, numbness, or weakness   Any questions/concerns regarding the procedure   2. Follow-up with me     Barbie Winslow MD  [facility]

## 2021-09-30 NOTE — PROGRESS NOTES
Pre-procedure Intake    Have you been fasting? No     If yes, for how long?     Are you taking a prescribed blood thinner such as coumadin, Plavix, Xarelto?    No    If yes, when did you take your last dose?     Do you take aspirin?  Yes    If cervical procedure, have you held aspirin for 6 days?   Yes    Do you have any allergies to contrast dye, iodine, steroid and/or numbing medications?  NO    Are you currently taking antibiotics or have an active infection?  NO    Have you had a fever/elevated temperature within the past week? NO    Are you currently taking oral steroids? NO    Do you have a ? Yes       Are you pregnant or breastfeeding?  Not Applicable    Have you received the COVID-19 vaccine? No        If yes, was it your 1st, 2nd or only dose needed?     Date of most recent vaccine:     Notify provider and RNs if systolic BP >170, diastolic BP >100, P >100 or O2 sats < 90%

## 2021-10-14 ENCOUNTER — MYC MEDICAL ADVICE (OUTPATIENT)
Dept: PALLIATIVE MEDICINE | Facility: CLINIC | Age: 66
End: 2021-10-14

## 2021-10-14 DIAGNOSIS — M47.24 OSTEOARTHRITIS OF SPINE WITH RADICULOPATHY, THORACIC REGION: Primary | ICD-10-CM

## 2021-10-15 NOTE — TELEPHONE ENCOUNTER
From: Kirti Otoole      Created: 10/14/2021 12:40 PM        *-*-*This message has not been handled.*-*-*    Hi Dr Winslow, I'm sorry to say that the neck injection we did on Sept 30th did not relieve my pain. (I waited 14 days to see if injection worked). What can we do next? I still have a lot of pain in that location and my right arm aches sometimes to my fingers. Plus now I'm having headaches above my right eyebrow and going towards my ear. Thanks for your input....        Call placed to Pt.  Pt reported that she didn't get any relief for any amount of time with her C7-T1 interlaminar epidural steroid injection    Please advise     Lonnie Stratton RN  Patient Care Supervisor   [facility] [place]

## 2021-10-19 NOTE — TELEPHONE ENCOUNTER
[employer] message sent to Pt      Dinesh Cotto,      I am sorry to hear that the injection was not helpful.   Your thoracic MRI did show right-sided disc bulges at T4/5, T5/6 and T6/7 with an extrusion at T7/8. If you are still interested in non-medication management options, I think it would be reasonable to try an injection at the T6/7 level given where your pain is located.   If you prefer to avoid another injection I would be happy to see you in clinic to talk about possible therapy and medication options.      Please let me know if you would like me to place an order for the injection. Otherwise you can call [phone] to schedule a clinic follow up.      Barbie Winslow MD  [facility]

## 2021-10-20 NOTE — TELEPHONE ENCOUNTER
DHRUV Cotto,     I have placed the order for the injection. You will be called to schedule. If you have not heard from the schedulers in 2 days you can call [phone] to schedule.     Barbie Winslow MD  [facility]

## 2021-10-20 NOTE — TELEPHONE ENCOUNTER
9/30/21-C7-T1 interlaminar epidural steroid injection      From: Kirti Otoole      Created: 10/19/2021 9:07 PM        *-*-*This message has not been handled.*-*-*    Hi Doctor, I'm ok with another injection. Because 3 disc's in a row are problematic,  its difficult to find the sweet spot. Let's try again! Thank you.          From: Lonnie Stratton RN      Created: 10/20/2021 11:20 AM        Kirti,      I will have Dr. Winslow order another injection and our scheduling team will call you to schedule.  If you don't hear from them by the end of the week please call [phone] to schedule.     Thanks     Lonnie Stratton RN  Patient Care Supervisor   [facility]         Generic order pended for injection.  Unsure if you are going to order for a different level though.    Lonnie Stratton RN  Patient Care Supervisor   [facility]

## 2021-10-21 ENCOUNTER — TELEPHONE (OUTPATIENT)
Dept: PALLIATIVE MEDICINE | Facility: CLINIC | Age: 66
End: 2021-10-21

## 2021-10-21 DIAGNOSIS — Z11.52 ENCOUNTER FOR SCREENING LABORATORY TESTING FOR COVID-19 VIRUS: Primary | ICD-10-CM

## 2021-10-21 NOTE — TELEPHONE ENCOUNTER
Covid test ordered     Lonnie Stratton, RN  Patient Care Supervisor   [facility] [place]

## 2021-10-21 NOTE — TELEPHONE ENCOUNTER
Screening Questions for Radiology Injections:    Injection to be done at which interventional clinic site? [facility]    If [facility] location, tell patient that this procedure requires a COVID-19 lab test be done within 4 days of the procedure. Would you still like to move forward with scheduling the procedure?  YES:    If YES, let patient know that someone will call them to schedule the COVID-19 test and that they will only receive a call back if the result is positive. Route to nursing to enter order.     Instruct patient to arrive as directed prior to the scheduled appointment time:    [place] minutes before      [place]: 30 minutes before; if IV needed 1 hour before     Procedure ordered by Goldy    Procedure ordered? T7/8 interlaminar epidural steroid injection, right paramedian approach      Transforaminal Cervical TYRESE -  [place] does NOT have providers that do these- [facility] and [facility] do have providers that do    As a reminder, receiving steroids can decrease your body's ability to fight infection.   Would you still like to move forward with scheduling the injection?  Yes    What insurance would patient like us to bill for this procedure? Medicare      Worker's comp or MVA (motor vehicle accident) -Any injection DO NOT SCHEDULE and route to Katerin Lockhart.      [employer] insurance - For SI joint injections, DO NOT SCHEDULE and route Katerin Lockhart.       ALL BCBS, [employer] and [employer]-Route to Katerin for review DO NOT SCHEDULE      IF SCHEDULING IN [place] AND NEEDS A PA, IT IS OKAY TO SCHEDULE. [place] HANDLES THEIR OWN PA'S AFTER THE PATIENT IS SCHEDULED. PLEASE SCHEDULE AT LEAST 1 WEEK OUT SO A PA CAN BE OBTAINED.    Any chance of pregnancy? NO   If YES, do NOT schedule and route to RN pool    Is an  needed? No     Patient has a drive home? (mandatory) YES: INFORMED    Is patient taking any blood thinners (i.e. plavix, coumadin, jantoven, warfarin, heparin,  pradaxa or dabigatran, etc)? No   If hold needed, do NOT schedule, route to RN pool     Is patient taking any aspirin products (includes Excedrin and Fiorinal)? Yes - Pt takes 81mg daily; instructed to hold 6 day(s) prior to procedure.      If more than 325mg/day, OK to schedule; Instruct pt to decrease to less than 325 mg for 7 days AND route to RN pool    For CERVICAL procedures, hold all aspirin products for 6 days.     Tell pt that if aspirin product is not held for 6 days, the procedure WILL BE cancelled.      Does the patient have a bleeding or clotting disorder? No     If YES, okay to schedule AND route to RN nurse pool    For any patients with platelet count <100, must be forwarded to provider    Any allergies to contrast dye, iodine, shellfish, or numbing and steroid medications? No    If YES, add allergy information to appointment notes AND route to the RN pool     If TYRESE and Contrast Dye / Iodine Allergy? DO NOT SCHEDULE, route to RN pool    Allergies: Sulfa drugs     Is patient diabetic?  No  If YES, instruct them to bring their glucometer.    Does patient have an active infection or treated for one within the past week? No     Is patient currently taking any antibiotics?  No     For patients on chronic, preventative, or prophylactic antibiotics, procedures may be scheduled.     For patients on antibiotics for active or recent infection:antibiotic course must have been completed for 4 days    Is patient currently taking any steroid medications? (i.e. Prednisone, Medrol)  No     For patients on steroid medications, course must have been completed for 4 days    Is patient actively being treated for cancer or immunocompromised? No  If YES, do NOT schedule and route to RN pool     Are you able to get on and off an exam table with minimal or no assistance? Yes  If NO, do NOT schedule and route to RN pool    Are you able to roll over and lay on your stomach with minimal or no assistance? Yes  If NO, do NOT  schedule and route to RN pool     Has the patient had a flu shot or any other vaccinations within 7 days before or after the procedure.  No     Have you recently had a COVID vaccine or have plans to get it in the near future? No    If yes, explain that for the vaccine to work best they need to:       wait 1 week before and 1 week after getting Vaccine #1    wait 1 week before and 2 weeks after getting Vaccine #2    wait 1 week before and 2 weeks after getting Vaccine BOOSTER    If patient has concerns about the timing, send to RN pool     Does patient have an MRI/CT?  YES: 2021  Check Procedure Scheduling Grid to see if required.      Was the MRI done within the last 3 years?  Yes    If yes, where was the MRI done i.e.[facility], [facility], [facility], [facility] etc? [facility]      If no, do not schedule and route to RN pool    If MRI was not done at [place], [facility] or [facility] do NOT schedule and route to RN pool.      If pt has an imaging disc, the injection MAY be scheduled but pt has to bring disc to appt.     If they show up without the disc the injection cannot be done    Procedure Specific Instructions:      If celiac plexus block, informed patient NPO for 6 hours and that it is okay to take medications with sips of water, especially blood pressure medications  Not Applicable         If this is for a cervical procedure, informed patient that aspirin needs to be held for 6 days.   YES:       If IV needed:    Do not schedule procedures requiring IV placement in the first appointment of the day or first appointment after lunch. Do NOT schedule at 0745, 0815 or 1245.     Instructed pt to arrive 30 minutes early for IV start if required. (Check Procedure Scheduling Grid)  Not Applicable    Reminders:      If you are started on any steroids or antibiotics between now and your appointment, you must contact us because the procedure may need to be cancelled.  Yes      For all procedures except radiofrequency  ablations (RFAs) and spinal cord stimulator (SCS) trials, informed patient:    IV sedation is not provided for this procedure.  If you feel that an oral anti-anxiety medication is needed, you can discuss this further with your referring provider or primary care provider.  The Pain Clinic provider will discuss specifics of what the procedure includes at your appointment.  Most procedures last 10-20 minutes.  We use numbing medications to help with any discomfort during the procedure.  Not Applicable      For patients 85 or older we recommend having an adult stay w/ them for the remainder of the day.       Does the patient have any questions?  NO  Dianne Jimenez  [facility]

## 2021-10-28 ENCOUNTER — OFFICE VISIT (OUTPATIENT)
Dept: FAMILY MEDICINE | Facility: CLINIC | Age: 66
End: 2021-10-28
Payer: MEDICARE

## 2021-10-28 ENCOUNTER — MYC MEDICAL ADVICE (OUTPATIENT)
Dept: PALLIATIVE MEDICINE | Facility: CLINIC | Age: 66
End: 2021-10-28

## 2021-10-28 VITALS
BODY MASS INDEX: 29.6 KG/M2 | DIASTOLIC BLOOD PRESSURE: 86 MMHG | HEART RATE: 98 BPM | HEIGHT: 70 IN | OXYGEN SATURATION: 98 % | RESPIRATION RATE: 14 BRPM | WEIGHT: 206.8 LBS | SYSTOLIC BLOOD PRESSURE: 124 MMHG | TEMPERATURE: 98 F
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
## 2021-10-28 DIAGNOSIS — T14.8XXA HEMATOMA OF SKIN: ICD-10-CM

## 2021-10-28 DIAGNOSIS — S81.852A DOG BITE OF LEFT CALF, INITIAL ENCOUNTER: Primary | ICD-10-CM

## 2021-10-28 PROCEDURE — 99213 OFFICE O/P EST LOW 20 MIN: CPT | Performed by: FAMILY MEDICINE

## 2021-10-28 ASSESSMENT — MIFFLIN-ST. JEOR: SCORE: 1544.32

## 2021-10-28 NOTE — PROGRESS NOTES
Assessment & Plan     Dog bite of left calf, initial encounter  Patient was reassured there is no sign of cellulitis or abscess.  The discoloration is consistent with hematoma.  Reviewed with patient guidelines for dog bites management.  Possible more than superficial puncture wound based on the hematoma development.  Discussed prophylaxis treatment with abx; reviewed her allergy list. Augmentin prescribed.  Patient is positive dodg was fully vaccinated for rabies, and the incident was not an attack.  Keep bite area clean with warm soapy water.  Return precautions discussed and given to patient.   - amoxicillin-clavulanate (AUGMENTIN) 875-125 MG tablet  Dispense: 14 tablet; Refill: 0    Hematoma of skin  Advised warm compress.  Advised expected gradual resolution over several days.  Return precautions discussed and given to patient.       Patient Instructions   Start augmentin for prophylaxis to prevent infection from developing.    You are up to date on tetanus vaccination. No need to give additional vaccine today.    Warm compress to the bruise 5-10 minutes at a time 3-4 x a day for next few days,..    Expect bruise to gradually fade.      Patient Education     Dog Bite  A dog bite can cause a wound deep enough to break the skin. In such cases, the wound is cleaned and sometimes closed. Wounds would be closed if they are gaping open or in cosmetically important areas such as the face. If the wound is closed, it is usually not completely closed. This is so that fluid can drain if the wound becomes infected. Often, wounds will be left open to heal. In addition to wound care, a tetanus shot (injection) may be given, if needed.     Home care    Wash your hands well with soap and warm water before and after caring for the wound. This helps lower the risk of infection.    Care for the wound as directed. If a dressing was applied to the wound, be sure to change it as directed.    If the wound bleeds, place a clean,  soft cloth on the wound. Then firmly apply pressure until the bleeding stops. This may take up to 5 minutes. Don't release the pressure and look at the wound during this time.    Most wounds heal within 10 days. But an infection can occur even with correct treatment. So be sure to check the wound daily for signs of infection (see below).    Antibiotics may be prescribed. These help prevent or treat infection. If you re given antibiotics, take them as directed. Also be sure to complete the medicines.  Rabies prevention  Rabies is a virus that can be carried in certain animals. These can include domestic animals such as dogs and cats. Pets fully vaccinated against rabies (2 shots) are at very low risk of infection. But because human rabies is almost always fatal, any biting pet should be confined for 10 days as an extra precaution. In general, if there is a risk for rabies, the following steps may need to be taken:     If someone s pet dog has bitten you, it should be kept in a secure area for the next 10 days to watch for signs of illness. (If the pet owner won t allow this, contact your local animal control center.) Ask to see the pet's vaccination history records. If the dog becomes ill or dies during that time, contact your local animal control center at once so the animal may be tested for rabies. If the dog stays healthy for the next 10 days, there is no danger of rabies in the animal or you.  ? If a stray dog bit you, contact your local animal control center. They can give information on capture, quarantine, and animal rabies testing.  ? If you can t find the animal that bit you in the next 2 days, and if rabies exists in your area, you may need to receive the rabies vaccine series. Call your healthcare provider right away. Or return to the emergency department promptly.  Follow-up care  Follow up with your healthcare provider, or as directed.  When to get medical advice  Call your healthcare provider or get  medical attention right away if any of these occur:     Signs of infection:  ? Spreading redness or warmth from the wound  ? Increased pain or swelling  ? Fever of 100.4 F (38 C) or higher, or as directed by your healthcare provider  ? Colored fluid or pus draining from the wound    Signs of rabies infection. Don't wait for any of these symptoms to occur! If you suspect that the dog that bit you is rabid, or if the dog is lost and can't be found, you should get the vaccine series.  ? Headache  ? Confusion  ? Strange behavior  ? Increased salivating and drooling  ? Seizure  ? Hallucination, anxiety, or agitation  ? Fever    Decreased ability to move any body part near the wound    Bleeding that can't be stopped after 5 minutes of firm pressure  [employer] last reviewed this educational content on 8/1/2019 2000-2020 [employer]. [address] [place] [address]. All rights reserved. This information is not intended as a substitute for professional medical care. Always follow your healthcare professional's instructions.  This information has been modified by your health care provider with permission from the publisher.           Patient Education     Hematoma  A hematoma is a collection of blood trapped outside of a blood vessel. It is what we think of as a bruise or a contusion. It is usually seen under the skin as a black and blue spot on your arm or leg, or a bump on your head after an injury. It can be almost anywhere on or in your body. It can also occur in an internal organ where it can be more serious.   A hematoma is caused by an injury with damage to small blood vessels. This causes blood to leak into the tissues. Blood forms a pocket under the skin that swells and looks like a purplish patch. Hematomas sometimes form under the skin from bleeding during childbirth and can be particularly serious. Another serious form of hematoma forms after a fall on the head, called a subdural  hematoma.   Gradually the blood in the hematoma is absorbed back into the body. The swelling and pain of the hematoma will go away. This takes from 1 to 4 weeks, depending on the size of the hematoma. The skin over the hematoma may turn bluish then brown and yellow as the blood is dissolved and absorbed. Usually, this only takes a couple of weeks but can last months.   Home care    Limit motion of the joints near the hematoma. If the hematoma is large and painful, avoid sports and other vigorous physical activity until the swelling and pain goes away.    Apply an ice pack (ice cubes in a plastic bag, or a frozen bag of peas, wrapped in a thin towel) over the injured area for 20 minutes every 1 to 2 hours the first day. Continue with ice packs 3 to 4 times a day for the next 2 days. Continue the use of ice packs for relief of pain and swelling as needed.    If you need anything for pain, you can take acetaminophen, unless you were given a different pain medicine to use. Talk with your healthcare provider before using this medicine if you have chronic liver or kidney disease. Also talk with your healthcare provider if you have had a stomach ulcer or digestive tract bleeding, or are taking blood-thinner medicines.    Follow-up care  Follow up with your healthcare provider, or as advised. If X-rays or a CT scan were done, you will be notified if there is a change in the reading, especially if it affects treatment.   When to seek medical advice  Call your healthcare provider right away if any of the following occur:    Redness around the hematoma    Increase in pain or warmth in the hematoma    Increase in size of the hematoma    Fever of 100.4 F (38 C) or higher, or as directed by your healthcare provider    If the hematoma is on the arm or leg, watch for:  ? Increased swelling or pain in the extremity  ? Numbness or tingling or blue color of the hand or foot  Michelle last reviewed this educational content on  "4/1/2018 2000-2021 [employer]. All rights reserved. This information is not intended as a substitute for professional medical care. Always follow your healthcare professional's instructions.               Return in about 1 week (around 11/4/2021) for In-clinic visit for with redness or increased pain..    Marcelino Pugh MD  [facility] MONICA Cotto is a 66 year old who presents for the following health issues:  Chief Complaint   Patient presents with     Dog Bite     Pt here to have dog bite evaluated, DOI was 10/23/21.     Dog is up to date on rabies vaccine.  HPI     Concern - dog bite  Onset: Saturday, 10/23/21  Description: Pt got bit behind left knee by dog on Saturday.  Noticed it being red and swollen yesterday.  Intensity: mild  Progression of Symptoms:  Pt just noticed yesterday  Accompanying Signs & Symptoms: redness, swelling and bruising  Previous history of similar problem: once in the past  Precipitating factors:        Worsened by: none  Alleviating factors:        Improved by: none  Therapies tried and outcome:  none     Patient said they were playing ball in the backyard when a niece's dog nipped her in the left calf. Didn't hurt that time so she forgot about it. Started hurting just yesterday - mild pain.  Patient said her niece is positive the dog is up to date with rabies vaccinations and all other vacciations.  Patient is utd with TDAP vaccination - given in 2016.    Review of Systems   Constitutional, HEENT, cardiovascular, pulmonary, GI, , musculoskeletal, neuro, skin, endocrine and psych systems are negative, except as otherwise noted.      Objective    /86   Pulse 98   Temp 98  F (36.7  C) (Tympanic)   Resp 14   Ht 1.772 m (5' 9.75\")   Wt 93.8 kg (206 lb 12.8 oz)   SpO2 98%   BMI 29.89 kg/m    Body mass index is 29.89 kg/m .  Physical Exam   GEN: alert, oriented x 3, NAD  SKIN: good turgor; small scabbed over puncture wound " on the left midcalf with 6.5 cm x 7 cm purpplish hematoma with no induration or fluctuance; only mild tenderness in the immediate site of the puncture wound  LLE: no calf swelling or diffuse erythema    No results found for any visits on 10/28/21.

## 2021-10-28 NOTE — PATIENT INSTRUCTIONS
Start augmentin for prophylaxis to prevent infection from developing.    You are up to date on tetanus vaccination. No need to give additional vaccine today.    Warm compress to the bruise 5-10 minutes at a time 3-4 x a day for next few days,..    Expect bruise to gradually fade.      Patient Education     Dog Bite  A dog bite can cause a wound deep enough to break the skin. In such cases, the wound is cleaned and sometimes closed. Wounds would be closed if they are gaping open or in cosmetically important areas such as the face. If the wound is closed, it is usually not completely closed. This is so that fluid can drain if the wound becomes infected. Often, wounds will be left open to heal. In addition to wound care, a tetanus shot (injection) may be given, if needed.     Home care    Wash your hands well with soap and warm water before and after caring for the wound. This helps lower the risk of infection.    Care for the wound as directed. If a dressing was applied to the wound, be sure to change it as directed.    If the wound bleeds, place a clean, soft cloth on the wound. Then firmly apply pressure until the bleeding stops. This may take up to 5 minutes. Don't release the pressure and look at the wound during this time.    Most wounds heal within 10 days. But an infection can occur even with correct treatment. So be sure to check the wound daily for signs of infection (see below).    Antibiotics may be prescribed. These help prevent or treat infection. If you re given antibiotics, take them as directed. Also be sure to complete the medicines.  Rabies prevention  Rabies is a virus that can be carried in certain animals. These can include domestic animals such as dogs and cats. Pets fully vaccinated against rabies (2 shots) are at very low risk of infection. But because human rabies is almost always fatal, any biting pet should be confined for 10 days as an extra precaution. In general, if there is a risk for  rabies, the following steps may need to be taken:     If someone s pet dog has bitten you, it should be kept in a secure area for the next 10 days to watch for signs of illness. (If the pet owner won t allow this, contact your local animal control center.) Ask to see the pet's vaccination history records. If the dog becomes ill or dies during that time, contact your local animal control center at once so the animal may be tested for rabies. If the dog stays healthy for the next 10 days, there is no danger of rabies in the animal or you.  ? If a stray dog bit you, contact your local animal control center. They can give information on capture, quarantine, and animal rabies testing.  ? If you can t find the animal that bit you in the next 2 days, and if rabies exists in your area, you may need to receive the rabies vaccine series. Call your healthcare provider right away. Or return to the emergency department promptly.  Follow-up care  Follow up with your healthcare provider, or as directed.  When to get medical advice  Call your healthcare provider or get medical attention right away if any of these occur:     Signs of infection:  ? Spreading redness or warmth from the wound  ? Increased pain or swelling  ? Fever of 100.4 F (38 C) or higher, or as directed by your healthcare provider  ? Colored fluid or pus draining from the wound    Signs of rabies infection. Don't wait for any of these symptoms to occur! If you suspect that the dog that bit you is rabid, or if the dog is lost and can't be found, you should get the vaccine series.  ? Headache  ? Confusion  ? Strange behavior  ? Increased salivating and drooling  ? Seizure  ? Hallucination, anxiety, or agitation  ? Fever    Decreased ability to move any body part near the wound    Bleeding that can't be stopped after 5 minutes of firm pressure  [employer] last reviewed this educational content on 8/1/2019 2000-2020 The [employer]. [address] [facility],  [place] [address]. All rights reserved. This information is not intended as a substitute for professional medical care. Always follow your healthcare professional's instructions.  This information has been modified by your health care provider with permission from the publisher.           Patient Education     Hematoma  A hematoma is a collection of blood trapped outside of a blood vessel. It is what we think of as a bruise or a contusion. It is usually seen under the skin as a black and blue spot on your arm or leg, or a bump on your head after an injury. It can be almost anywhere on or in your body. It can also occur in an internal organ where it can be more serious.   A hematoma is caused by an injury with damage to small blood vessels. This causes blood to leak into the tissues. Blood forms a pocket under the skin that swells and looks like a purplish patch. Hematomas sometimes form under the skin from bleeding during childbirth and can be particularly serious. Another serious form of hematoma forms after a fall on the head, called a subdural hematoma.   Gradually the blood in the hematoma is absorbed back into the body. The swelling and pain of the hematoma will go away. This takes from 1 to 4 weeks, depending on the size of the hematoma. The skin over the hematoma may turn bluish then brown and yellow as the blood is dissolved and absorbed. Usually, this only takes a couple of weeks but can last months.   Home care    Limit motion of the joints near the hematoma. If the hematoma is large and painful, avoid sports and other vigorous physical activity until the swelling and pain goes away.    Apply an ice pack (ice cubes in a plastic bag, or a frozen bag of peas, wrapped in a thin towel) over the injured area for 20 minutes every 1 to 2 hours the first day. Continue with ice packs 3 to 4 times a day for the next 2 days. Continue the use of ice packs for relief of pain and swelling as needed.    If you need  anything for pain, you can take acetaminophen, unless you were given a different pain medicine to use. Talk with your healthcare provider before using this medicine if you have chronic liver or kidney disease. Also talk with your healthcare provider if you have had a stomach ulcer or digestive tract bleeding, or are taking blood-thinner medicines.    Follow-up care  Follow up with your healthcare provider, or as advised. If X-rays or a CT scan were done, you will be notified if there is a change in the reading, especially if it affects treatment.   When to seek medical advice  Call your healthcare provider right away if any of the following occur:    Redness around the hematoma    Increase in pain or warmth in the hematoma    Increase in size of the hematoma    Fever of 100.4 F (38 C) or higher, or as directed by your healthcare provider    If the hematoma is on the arm or leg, watch for:  ? Increased swelling or pain in the extremity  ? Numbness or tingling or blue color of the hand or foot  Michelle last reviewed this educational content on 4/1/2018 2000-2021 [employer]. All rights reserved. This information is not intended as a substitute for professional medical care. Always follow your healthcare professional's instructions.

## 2021-10-28 NOTE — TELEPHONE ENCOUNTER
MPer patient myChart message:  From: Kirti Otoole      Created: 10/28/2021 3:33 PM      Hi Dr Herrmann,  I don't think my earlier message was sent, so I'm sending this. I saw Dr Pugh today about a dog bite/nip. He found no infection and put me on a prophylactic antibiotic  just to be sure. Can we still do the neck injection on Nov 4th?

## 2021-10-29 NOTE — TELEPHONE ENCOUNTER
Clerkyt message sent to Pt    DHRUV Cotto,      It looks like the prescription is for 7 days. It looks like the start date was 10/28. You can therefore complete the course of antibiotics prior to your procedure.   Thank you for reaching out to ask.      Barbie Winslow MD  [facility] Kirti,

## 2021-11-01 ENCOUNTER — LAB (OUTPATIENT)
Dept: LAB | Facility: CLINIC | Age: 66
End: 2021-11-01
Payer: MEDICARE

## 2021-11-01 DIAGNOSIS — Z11.52 ENCOUNTER FOR SCREENING LABORATORY TESTING FOR COVID-19 VIRUS: ICD-10-CM

## 2021-11-01 PROCEDURE — U0005 INFEC AGEN DETEC AMPLI PROBE: HCPCS

## 2021-11-01 PROCEDURE — U0003 INFECTIOUS AGENT DETECTION BY NUCLEIC ACID (DNA OR RNA); SEVERE ACUTE RESPIRATORY SYNDROME CORONAVIRUS 2 (SARS-COV-2) (CORONAVIRUS DISEASE [COVID-19]), AMPLIFIED PROBE TECHNIQUE, MAKING USE OF HIGH THROUGHPUT TECHNOLOGIES AS DESCRIBED BY CMS-2020-01-R: HCPCS

## 2021-11-02 LAB — SARS-COV-2 RNA RESP QL NAA+PROBE: NEGATIVE

## 2021-11-04 ENCOUNTER — HOSPITAL ENCOUNTER (OUTPATIENT)
Dept: PALLIATIVE MEDICINE | Facility: CLINIC | Age: 66
Discharge: HOME | End: 2021-11-04
Attending: FAMILY MEDICINE | Admitting: FAMILY MEDICINE
Payer: MEDICARE

## 2021-11-04 VITALS
DIASTOLIC BLOOD PRESSURE: 92 MMHG | SYSTOLIC BLOOD PRESSURE: 142 MMHG | TEMPERATURE: 97 F | RESPIRATION RATE: 16 BRPM | HEART RATE: 72 BPM

## 2021-11-04 DIAGNOSIS — M54.14 THORACIC RADICULOPATHY: ICD-10-CM

## 2021-11-04 DIAGNOSIS — M47.24 OSTEOARTHRITIS OF SPINE WITH RADICULOPATHY, THORACIC REGION: ICD-10-CM

## 2021-11-04 PROCEDURE — 255N000002 HC RX 255 OP 636: Performed by: STUDENT IN AN ORGANIZED HEALTH CARE EDUCATION/TRAINING PROGRAM

## 2021-11-04 PROCEDURE — 62321 NJX INTERLAMINAR CRV/THRC: CPT | Performed by: STUDENT IN AN ORGANIZED HEALTH CARE EDUCATION/TRAINING PROGRAM

## 2021-11-04 PROCEDURE — 250N000011 HC RX IP 250 OP 636: Performed by: STUDENT IN AN ORGANIZED HEALTH CARE EDUCATION/TRAINING PROGRAM

## 2021-11-04 PROCEDURE — 250N000009 HC RX 250: Performed by: STUDENT IN AN ORGANIZED HEALTH CARE EDUCATION/TRAINING PROGRAM

## 2021-11-04 RX ORDER — LIDOCAINE HYDROCHLORIDE 10 MG/ML
5 INJECTION, SOLUTION EPIDURAL; INFILTRATION; INTRACAUDAL; PERINEURAL ONCE
Status: COMPLETED | OUTPATIENT
Start: 2021-11-04 | End: 2021-11-04

## 2021-11-04 RX ORDER — BUPIVACAINE HYDROCHLORIDE 2.5 MG/ML
3 INJECTION, SOLUTION EPIDURAL; INFILTRATION; INTRACAUDAL ONCE
Status: COMPLETED | OUTPATIENT
Start: 2021-11-04 | End: 2021-11-04

## 2021-11-04 RX ORDER — TRIAMCINOLONE ACETONIDE 40 MG/ML
40 INJECTION, SUSPENSION INTRA-ARTICULAR; INTRAMUSCULAR ONCE
Status: COMPLETED | OUTPATIENT
Start: 2021-11-04 | End: 2021-11-04

## 2021-11-04 RX ORDER — IOPAMIDOL 408 MG/ML
10 INJECTION, SOLUTION INTRATHECAL ONCE
Status: COMPLETED | OUTPATIENT
Start: 2021-11-04 | End: 2021-11-04

## 2021-11-04 RX ADMIN — IOPAMIDOL 1 ML: 408 INJECTION, SOLUTION INTRATHECAL at 11:03

## 2021-11-04 RX ADMIN — BUPIVACAINE HYDROCHLORIDE 1 ML: 2.5 INJECTION, SOLUTION EPIDURAL; INFILTRATION; INTRACAUDAL at 11:03

## 2021-11-04 RX ADMIN — TRIAMCINOLONE ACETONIDE 40 MG: 40 INJECTION, SUSPENSION INTRA-ARTICULAR; INTRAMUSCULAR at 11:03

## 2021-11-04 RX ADMIN — LIDOCAINE HYDROCHLORIDE 1 ML: 10 INJECTION, SOLUTION EPIDURAL; INFILTRATION; INTRACAUDAL; PERINEURAL at 11:03

## 2021-11-04 NOTE — DISCHARGE INSTRUCTIONS
[facility]   Procedure Discharge Instructions    Today you saw:   Dr. Barbie Winslow    You had an:  Thoracic Epidural steroid injection      Medications used:  Lidocaine   Bupivacaine   IsoVue   Kenalog   Normal saline        Be cautious when walking. Numbness and/or weakness in the upper extremities may occur for up to 6-8 hours after the procedure due to effect of the local anesthetic    Do not drive for 6 hours. The effect of the local anesthetic could slow your reflexes.     You may resume your regular activities after 24 hours    Avoid strenuous activity for the first 24 hours    You may shower, however avoid swimming, tub baths or hot tubs for 24 hours following your procedure    You may have a mild to moderate increase in pain for several days following the injection.    It may take up to 14 days for the steroid medication to start working although you may feel the effect as early as a few days after the procedure.       You may use ice packs for 10-15 minutes, 3 to 4 times a day at the injection site for comfort    Do not use heat to painful areas for 6 to 8 hours. This will give the local anesthetic time to wear off and prevent you from accidentally burning your skin.     Unless you have been directed to avoid the use of anti-inflammatory medications (NSAIDS), you may use medications such as ibuprofen, Aleve or Tylenol for pain control if needed.     Possible side effects of steroids that you may experience include flushing, elevated blood pressure, increased appetite, mild headaches and restlessness.  All of these symptoms will get better with time.    If you experience any of the following, call the Pain Clinic during work hours (Mon-Friday 8-4:30 pm) at [phone] or the Provider Line after hours at [phone]:  -Fever over 100 degree F  -Swelling, bleeding, redness, drainage, warmth at the injection site  -Progressive weakness or numbness in your arms  -Unusual new  onset of pain that is not improving

## 2021-11-04 NOTE — PROGRESS NOTES
[facility] - Procedure Note    Date of Visit: 11/4/2021    Procedure performed: T7/8 interlaminar epidural steroid injection with fluoroscopic guidance  Diagnosis: thoracic radiculitis/radiculopathy; thoracic spondylosis  : Barbie Winslow MD  Anesthesia: none    Indications: Ina Otoole is a 66 year old female who is who is well known to me from previous visits, here for thoracic epidural steroid injection. The patient describes midline thoracic back pain at the T7/8 level. The patient has been exhibiting symptoms consistent with thoracic intraspinal inflammation and radiculopathy. Symptoms have been persistent, disabling, and intermittently severe. The patient reports minimal improvement with conservative treatment, including medications, previous injections.    Thoracic MRI was done on 9/13/2021 that showed   At T6-T7 there is a shallow right central protrusion. At  T7-T8, there is a shallow caudally migrating central disc extrusion.  At T8-T9, there is a small bilobed disc bulge. Shallow disc bulges are  seen elsewhere. There is no significant spinal canal stenosis. No mass  effect on the spinal cord is seen.  At T10-T11, there is  mild-to-moderate left and mild-to-moderate right neural foraminal  stenosis. There is mild-to-moderate right T9-T10 neural foraminal  stenosis.     Allergies:      Allergies   Allergen Reactions     Sulfa Drugs Hives        Vitals:  BP (!) 142/92 (BP Location: Left arm, Patient Position: Sitting)   Pulse 72   Temp 97  F (36.1  C) (Tympanic)   Resp 16     Review of Systems: The patient denies recent fever, chills, illness, use of antibiotics or anticoagulants. All other 10-point review of systems negative.     Procedure: The procedure and risks were explained, and informed written consent was obtained from the patient. Risks include but are not limited to: infection, bleeding, increased pain, and damage to soft tissue, nerve, spinal cord, muscle,  and vasculature structures. After getting informed consent, patient was brought into the procedure suite and was placed in a prone position on the procedure table. A Pause for the Cause was performed. Patient was prepped and draped in sterile fashion.     The T7/8 interspace was identified with use of fluoroscopy in AP view.  A 25-gauge, 1.5 inch needle was used to anesthetize the skin and subcutaneous tissue entry site with a total of 3 ml of 1% lidocaine in a direction coaxial to the line of approach. Under fluoroscopic visualization, a 22-gauge, 3.5 inch Tuohy epidural needle was slowly advanced towards the epidural space a few millimeters left of midline. The latter part of the needle advancement was guided with fluoroscopy in the contralateral oblique view. The epidural space was identified using loss of resistance technique with normal saline. After negative aspiration for heme and cerebrospinal fluid, a total of 1 mL of Omnipaque was injected to confirm needle placement. 9 mL of contrast was wasted. Epidurogram confirmed spread within the posterior epidural space. 1 ml of 10mg/ml of dexamethasone, 1 ml of 0.25% bupivacaine and 1 ml of preservative free normal saline was injected. The needle was removed.  Images were saved to PACS.    The patient tolerated the procedure well, and there was no evidence of procedural complications. No new sensory or motor deficits were noted following the procedure. The patient was stable and able to ambulate on discharge home. Post-procedure instructions were provided.     Pre-procedure pain score: 6/10 in the mid-back  Post-procedure pain score: 5/10 in the mid-back    Assessment/Plan: nIa Otoole is a 66 year old female s/p cervical interlaminar epidural steroid injection today for thoracic spondylosis and radiculitis/radiculopathy.     1. Following today's procedure, the patient was advised to contact the [facility] for any of the following:   Fever,  chills, or night sweats   New onset of pain, numbness, or weakness   Any questions/concerns regarding the procedure  If unable to contact the Pain Center, the patient was instructed to go to a local Emergency Room for any complications.   2. Follow-up with me in 2+ weeks for post-procedure evaluation.    Barbie Winslow MD  [facility]

## 2021-11-09 ENCOUNTER — MYC MEDICAL ADVICE (OUTPATIENT)
Dept: PALLIATIVE MEDICINE | Facility: CLINIC | Age: 66
End: 2021-11-09
Payer: MEDICARE

## 2022-02-20 ENCOUNTER — HEALTH MAINTENANCE LETTER (OUTPATIENT)
Age: 67
End: 2022-02-20

## 2022-03-18 ASSESSMENT — ENCOUNTER SYMPTOMS
WEAKNESS: 1
FREQUENCY: 0
MYALGIAS: 1
PARESTHESIAS: 0
HEARTBURN: 0
BREAST MASS: 0
PALPITATIONS: 1
SORE THROAT: 0
FEVER: 0
HEADACHES: 0
HEMATURIA: 0
DIZZINESS: 0
SHORTNESS OF BREATH: 1
ARTHRALGIAS: 1
EYE PAIN: 0
COUGH: 0
CONSTIPATION: 0
ABDOMINAL PAIN: 0
DYSURIA: 0
JOINT SWELLING: 1
HEMATOCHEZIA: 0
NAUSEA: 0
NERVOUS/ANXIOUS: 0
DIARRHEA: 0
CHILLS: 0

## 2022-03-18 ASSESSMENT — ACTIVITIES OF DAILY LIVING (ADL): CURRENT_FUNCTION: NO ASSISTANCE NEEDED

## 2022-03-22 ENCOUNTER — OFFICE VISIT (OUTPATIENT)
Dept: FAMILY MEDICINE | Facility: CLINIC | Age: 67
End: 2022-03-22
Payer: MEDICARE

## 2022-03-22 VITALS
SYSTOLIC BLOOD PRESSURE: 110 MMHG | RESPIRATION RATE: 16 BRPM | BODY MASS INDEX: 29.35 KG/M2 | OXYGEN SATURATION: 97 % | DIASTOLIC BLOOD PRESSURE: 80 MMHG | TEMPERATURE: 97.1 F | HEART RATE: 69 BPM | WEIGHT: 205 LBS | HEIGHT: 70 IN

## 2022-03-22 DIAGNOSIS — M24.849 LOCKING FINGER JOINT: ICD-10-CM

## 2022-03-22 DIAGNOSIS — E55.9 VITAMIN D DEFICIENCY: ICD-10-CM

## 2022-03-22 DIAGNOSIS — R21 RASH: ICD-10-CM

## 2022-03-22 DIAGNOSIS — R00.2 PALPITATIONS: ICD-10-CM

## 2022-03-22 DIAGNOSIS — I80.299 PHLEBITIS AND THROMBOPHLEBITIS OF OTHER DEEP VESSELS OF UNSPECIFIED LOWER EXTREMITY (H): ICD-10-CM

## 2022-03-22 DIAGNOSIS — Z00.00 ENCOUNTER FOR MEDICARE ANNUAL WELLNESS EXAM: Primary | ICD-10-CM

## 2022-03-22 LAB — DEPRECATED CALCIDIOL+CALCIFEROL SERPL-MC: 24 UG/L (ref 20–75)

## 2022-03-22 PROCEDURE — 99213 OFFICE O/P EST LOW 20 MIN: CPT | Mod: 25 | Performed by: FAMILY MEDICINE

## 2022-03-22 PROCEDURE — G0438 PPPS, INITIAL VISIT: HCPCS | Performed by: FAMILY MEDICINE

## 2022-03-22 PROCEDURE — 82306 VITAMIN D 25 HYDROXY: CPT | Performed by: FAMILY MEDICINE

## 2022-03-22 PROCEDURE — 36415 COLL VENOUS BLD VENIPUNCTURE: CPT | Performed by: FAMILY MEDICINE

## 2022-03-22 RX ORDER — MULTIVITAMIN
1 TABLET ORAL DAILY
COMMUNITY
Start: 2022-03-22

## 2022-03-22 ASSESSMENT — ENCOUNTER SYMPTOMS
EYE PAIN: 0
MYALGIAS: 1
PALPITATIONS: 1
SORE THROAT: 0
ARTHRALGIAS: 1
HEARTBURN: 0
COUGH: 0
HEADACHES: 0
DIZZINESS: 0
WEAKNESS: 1
SHORTNESS OF BREATH: 1
CONSTIPATION: 0
FEVER: 0
JOINT SWELLING: 1
FREQUENCY: 0
ABDOMINAL PAIN: 0
PARESTHESIAS: 0
CHILLS: 0
NAUSEA: 0
HEMATOCHEZIA: 0
HEMATURIA: 0
DYSURIA: 0
NERVOUS/ANXIOUS: 0
BREAST MASS: 0
DIARRHEA: 0

## 2022-03-22 ASSESSMENT — ACTIVITIES OF DAILY LIVING (ADL): CURRENT_FUNCTION: NO ASSISTANCE NEEDED

## 2022-03-22 ASSESSMENT — PAIN SCALES - GENERAL: PAINLEVEL: NO PAIN (0)

## 2022-03-22 NOTE — PATIENT INSTRUCTIONS
Patient Education   Personalized Prevention Plan  You are due for the preventive services outlined below.  Your care team is available to assist you in scheduling these services.  If you have already completed any of these items, please share that information with your care team to update in your medical record.  Health Maintenance Due   Topic Date Due     Osteoporosis Screening  Never done     ANNUAL REVIEW OF HM ORDERS  Never done     COVID-19 Vaccine (1) Never done     Hepatitis C Screening  Never done     Zoster (Shingles) Vaccine (1 of 2) Never done     Annual Wellness Visit  03/09/2018     Discuss Advance Care Planning  08/28/2018     Pneumococcal Vaccine (2 of 2 - PPSV23) 08/02/2019     FALL RISK ASSESSMENT  01/27/2021     Flu Vaccine (1) 09/01/2021                 Thank you for choosing [facility].  You may be receiving an email and/or telephone survey request from [facility] [employer] regarding your visit today.  Please take a few minutes to respond to the survey to let us know how we are doing.      If you have questions or concerns, please contact us via [employer] or you can contact your care team at [phone] option 2.    Our Clinic hours are:  Monday - Thursday 7am-6pm  Friday 7am-5pm    The [place] outpatient lab hours are:  Monday - Friday 7am-4:30pm    Appointments are required, call [phone]    If you have clinical questions after hours or would like to schedule an appointment,  call the clinic at [phone].

## 2022-03-22 NOTE — PROGRESS NOTES
"SUBJECTIVE:   Ina Otoole is a 67 year old female who presents for Preventive Visit.     Patient is a 67-year-old female here for annual physical.  She has a past medical history significant for GERD, vitamin D deficiency, goiter, history of phlebitis and trouble phlebitis.      She comes in today with some questions about some symptoms that she has been having the last couple of months.      She reports that occasionally her fingers will lock up on her and change color to blue and black.These symptoms last for a few minutes and then resolve on their own. There is no pain with the change is color of her fingers. She does not know if it is related to cold weather or not. She denies any injury to the hands.     Other concerns today is that she has had palpitations that come and go.  She brought a cardiac monitor recently and has been monitoring her heart rate during the time when she has those palpitations.  She says that it comes suddenly and she has no symptoms of shortness of breath or chest pain or lightheadedness with it.  They usually last for a few minutes and then they are gone. She is aware of her heart beating really fast when she has this palpitations.  The occur every couple of months.    She is also requesting a referral to dermatology as she has some concerning rash she will like looked into.      Patient has been advised of split billing requirements and indicates understanding: Yes  Are you in the first 12 months of your Medicare coverage?  No    Healthy Habits:     In general, how would you rate your overall health?  Good    Frequency of exercise:  2-3 days/week    Duration of exercise:  15-30 minutes    Do you usually eat at least 4 servings of fruit and vegetables a day, include whole grains    & fiber and avoid regularly eating high fat or \"junk\" foods?  Yes    Taking medications regularly:  Yes    Medication side effects:  None    Ability to successfully perform activities of daily living:  No " assistance needed    Home Safety:  No safety concerns identified    Hearing Impairment:  No hearing concerns    In the past 6 months, have you been bothered by leaking of urine?  No    In general, how would you rate your overall mental or emotional health?  Good      PHQ-2 Total Score: 0    Additional concerns today:  Yes    Do you feel safe in your environment? Yes    Have you ever done Advance Care Planning? (For example, a Health Directive, POLST, or a discussion with a medical provider or your loved ones about your wishes): No, advance care planning information given to patient to review.  Patient declined advance care planning discussion at this time.      Fall risk  Fallen 2 or more times in the past year?: No  Any fall with injury in the past year?: No    Cognitive Screening   1) Repeat 3 items (Leader, Season, Table)    2) Clock draw: NORMAL  3) 3 item recall: Recalls 2 objects   Results: NORMAL clock, 1-2 items recalled: COGNITIVE IMPAIRMENT LESS LIKELY    Mini-CogTM Copyright KANDACE Plunkett. Licensed by the author for use in [facility]; reprinted with permission (gisel@[facility]). All rights reserved.      Do you have sleep apnea, excessive snoring or daytime drowsiness?: no    Reviewed and updated as needed this visit by clinical staff   Tobacco  Allergies  Meds   Med Hx  Surg Hx  Fam Hx  Soc Hx        Reviewed and updated as needed this visit by Provider                 Social History     Tobacco Use     Smoking status: Former Smoker     Packs/day: 0.00     Years: 0.00     Pack years: 0.00     Quit date: 2000     Years since quittin.0     Smokeless tobacco: Never Used   Substance Use Topics     Alcohol use: Yes     Comment: only for special occassions     If you drink alcohol do you typically have >3 drinks per day or >7 drinks per week? No    Alcohol Use 3/18/2022   Prescreen: >3 drinks/day or >7 drinks/week? No   Prescreen: >3 drinks/day or >7 drinks/week? -           Chief  Complaint   Patient presents with     Physical     Wellness physical exam.     Blood Draw     Patient is fasting today for labs.     Imm/Inj     Declined flu and covid injections today.         Current providers sharing in care for this patient include:   Patient Care Team:  Verito Ashby MD as PCP - General (Family Practice)  Verito Ashby MD as Assigned PCP  Supriya Arrieta MD as Assigned OBGYN Provider    The following health maintenance items are reviewed in Epic and correct as of today:  Health Maintenance Due   Topic Date Due     DEXA  Never done     ANNUAL REVIEW OF HM ORDERS  Never done     COVID-19 Vaccine (1) Never done     HEPATITIS C SCREENING  Never done     ZOSTER IMMUNIZATION (1 of 2) Never done     ADVANCE CARE PLANNING  08/28/2018     Pneumococcal Vaccine: 65+ Years (2 of 2 - PPSV23) 08/02/2019     FALL RISK ASSESSMENT  01/27/2021     INFLUENZA VACCINE (1) 09/01/2021     Lab work is in process  Labs reviewed in EPIC  BP Readings from Last 3 Encounters:   03/22/22 110/80   11/04/21 (!) 142/92   10/28/21 124/86    Wt Readings from Last 3 Encounters:   03/22/22 93 kg (205 lb)   10/28/21 93.8 kg (206 lb 12.8 oz)   04/14/21 93 kg (205 lb)                  Patient Active Problem List   Diagnosis     Esophageal reflux     Myalgia and myositis     Goiter     Status post total left knee replacement     CARDIOVASCULAR SCREENING; LDL GOAL LESS THAN 160     Renal calculus or stone     IBS (irritable bowel syndrome)     Advanced directives, counseling/discussion     Thrombophlebitis leg     Phlebitis and thrombophlebitis of other deep vessels of unspecified lower extremity (H)     Vulvar ulcer     Past Surgical History:   Procedure Laterality Date     ABDOMEN SURGERY       ARTHROSCOPY KNEE      left meniscus repair     ARTHROSCOPY KNEE RT/LT      left - meniscus repair     COLONOSCOPY N/A 8/24/2017    Procedure: COLONOSCOPY;  Colonoscopy  ;  Surgeon: Abhishek Escobedo MD;  Location: [place]  GI     COLONOSCOPY       KNEE SURGERY  2013    left knee replacement     LAPAROSCOPIC CHOLECYSTECTOMY  2011    Procedure:LAPAROSCOPIC CHOLECYSTECTOMY; Surgeon:SUMAYA ZAMORANO; Location:[place] OR     LAPAROSCOPIC CHOLECYSTECTOMY       REPLACEMENT TOTAL KNEE Right 2018    Procedure: RIGHT TOTAL KNEE ARTHROPLASTY COMPUTER ASSIST;  Surgeon: Kevin Haji MD;  Location: [facility] OR;  Service:      STONE ANALYSIS       THYROID BIOPSY       TOTAL KNEE ARTHROPLASTY Left      TUBAL LIGATION         Social History     Tobacco Use     Smoking status: Former Smoker     Packs/day: 0.00     Years: 0.00     Pack years: 0.00     Quit date: 2000     Years since quittin.0     Smokeless tobacco: Never Used   Substance Use Topics     Alcohol use: Yes     Comment: only for special occassions     Family History   Problem Relation Age of Onset     Diabetes Mother      Hypertension Mother      Cerebrovascular Disease Mother      Cancer Mother         melanoma     Dementia Mother      Respiratory Father         copd     Cancer - colorectal Father      Bronchitis Father      Breast Cancer Maternal Grandmother      Diabetes Paternal Grandmother      Diabetes Brother      Hypertension Brother      Hypertension Sister      Multiple Sclerosis Sister      Neurologic Disorder Sister         ms     Other - See Comments Daughter         Transverse Myelitis     Asthma No family hx of      Prostate Cancer No family hx of          Current Outpatient Medications   Medication Sig Dispense Refill     aspirin (ASA) 81 MG EC tablet Take 1 tablet (81 mg) by mouth daily       clobetasol (TEMOVATE) 0.05 % external ointment Apply topically 2 times daily 30 g 0     famotidine (PEPCID) 20 MG tablet Take 1 tablet (20 mg) by mouth 2 times daily 30 tablet 6     Ibuprofen (ADVIL PO) Take 400 mg by mouth 2 times daily        multivitamin (ONE-DAILY) tablet Take 1 tablet by mouth daily Also has Vitamin D 25 mcg in the  vitamin.       senna-docusate (SENOKOT-S;PERICOLACE) 8.6-50 MG per tablet Take 2 tablets by mouth 2 times daily        Allergies   Allergen Reactions     Sulfa Drugs Hives     Pneumonia Vaccine:Adults age 65+ who received Pneumovax (PPSV23) at 65 years or older: Should be given PCV13 > 1 year after their most recent PPSV23  Mammogram Screening: Mammogram Screening: Recommended mammography every 1-2 years with patient discussion and risk factor consideration    FHS-7:   Breast CA Risk Assessment (FHS-7) 3/18/2022   Did any of your first-degree relatives have breast or ovarian cancer? Yes   Did any of your relatives have bilateral breast cancer? No   Did any man in your family have breast cancer? No   Did any woman in your family have breast and ovarian cancer? Yes   Did any woman in your family have breast cancer before age 50 y? No   Do you have 2 or more relatives with breast and/or ovarian cancer? No   Do you have 2 or more relatives with breast and/or bowel cancer? No       Mammogram Screening: Recommended mammography every 1-2 years with patient discussion and risk factor consideration  Pertinent mammograms are reviewed under the imaging tab.    Review of Systems   Constitutional: Negative for chills and fever.   HENT: Negative for congestion, ear pain, hearing loss and sore throat.    Eyes: Negative for pain and visual disturbance.   Respiratory: Positive for shortness of breath. Negative for cough.    Cardiovascular: Positive for palpitations and peripheral edema. Negative for chest pain.   Gastrointestinal: Negative for abdominal pain, constipation, diarrhea, heartburn, hematochezia and nausea.   Breasts:  Negative for tenderness, breast mass and discharge.   Genitourinary: Positive for urgency and vaginal discharge. Negative for dysuria, frequency, genital sores, hematuria, pelvic pain and vaginal bleeding.   Musculoskeletal: Positive for arthralgias, joint swelling and myalgias.   Skin: Negative for rash.  "  Neurological: Positive for weakness. Negative for dizziness, headaches and paresthesias.   Psychiatric/Behavioral: Negative for mood changes. The patient is not nervous/anxious.          OBJECTIVE:   /80   Pulse 69   Temp 97.1  F (36.2  C) (Tympanic)   Resp 16   Ht 1.772 m (5' 9.75\")   Wt 93 kg (205 lb)   SpO2 97%   BMI 29.63 kg/m   Estimated body mass index is 29.63 kg/m  as calculated from the following:    Height as of this encounter: 1.772 m (5' 9.75\").    Weight as of this encounter: 93 kg (205 lb).  Physical Exam  GENERAL: healthy, alert and no distress  EYES: Eyes grossly normal to inspection, PERRL and conjunctivae and sclerae normal  HENT: ear canals and TM's normal, nose and mouth without ulcers or lesions  NECK: no adenopathy, no asymmetry, masses, or scars and thyroid normal to palpation  RESP: lungs clear to auscultation - no rales, rhonchi or wheezes  CV: regular rate and rhythm, normal S1 S2, no S3 or S4, no murmur, click or rub, no peripheral edema and peripheral pulses strong  ABDOMEN: soft, nontender, no hepatosplenomegaly, no masses and bowel sounds normal  MS: no gross musculoskeletal defects noted, no edema  PSYCH: mentation appears normal, affect normal/bright  LYMPH: no cervical, supraclavicular, axillary, or inguinal adenopathy    Diagnostic Test Results:  Pending     ASSESSMENT / PLAN:   (Z00.00) Encounter for Medicare annual wellness exam  (primary encounter diagnosis)  Comment: Patient is a 67-year-old who he comes in for annual physical.  Preventive measures discussed with her.  Colonoscopy is not due for a few more years.  Reminded of her mammogram.  Immunizations reviewed.  Recommended weight loss.  Plan: As above    (R21) Rash  Comment: Had some concerning rash also like looked into.  Dermatology referral placed.  Plan: Adult Dermatology Referral, OFFICE/OUTPT         VISIT,WESTLEY ARVIZU III      (I80.299) Phlebitis and thrombophlebitis of other deep vessels of unspecified " "lower extremity (H)  Comment: Resolved  Plan: OFFICE/OUTPT VISIT,EST,LEVL III      (E55.9) Vitamin D deficiency  Comment: Last labs showed low vitamin D were checked today.  Plan: Vitamin D Deficiency, OFFICE/OUTPT         VISIT,EST,LEVL III      (R00.2) Palpitations  Comment: Patient reports palpitations ongoing for a couple of years.  She has them every couple of months.  She will she will reach out to us when she has another episode so we can all a Holter monitor.  Plan: OFFICE/OUTPT VISIT,WESTLEY ARVIZU III    (M24.849) Locking finger joint  Comment: She has been experiencing finger joint locking and discoloration.  Recommend watching this.  She may have trigger finger.  There is also a possibility of some vasculitis or Raynaud's syndrome. She will keep an eye on this for now.  Plan: OFFICE/OUTPT VISIT,WESTLEY ARVIZU III        Patient has been advised of split billing requirements and indicates understanding: Yes    COUNSELING:  Reviewed preventive health counseling, as reflected in patient instructions       Regular exercise       Healthy diet/nutrition       Vision screening    Estimated body mass index is 29.63 kg/m  as calculated from the following:    Height as of this encounter: 1.772 m (5' 9.75\").    Weight as of this encounter: 93 kg (205 lb).    Weight management plan: Discussed healthy diet and exercise guidelines    She reports that she quit smoking about 22 years ago. She smoked 0.00 packs per day for 0.00 years. She has never used smokeless tobacco.      Appropriate preventive services were discussed with this patient, including applicable screening as appropriate for cardiovascular disease, diabetes, osteopenia/osteoporosis, and glaucoma.  As appropriate for age/gender, discussed screening for colorectal cancer, prostate cancer, breast cancer, and cervical cancer. Checklist reviewing preventive services available has been given to the patient.    Reviewed patients plan of care and provided an AVS. The Basic " Care Plan (routine screening as documented in Health Maintenance) for Ina meets the Care Plan requirement. This Care Plan has been established and reviewed with the Patient.    Counseling Resources:  ATP IV Guidelines  Pooled Cohorts Equation Calculator  Breast Cancer Risk Calculator  Breast Cancer: Medication to Reduce Risk  FRAX Risk Assessment  ICSI Preventive Guidelines  Dietary Guidelines for Americans, 2010  [employer]'s MyPlate  ASA Prophylaxis  Lung CA Screening    Verito Ashby MD  [facility]    Identified Health Risks:

## 2022-03-25 ENCOUNTER — MYC MEDICAL ADVICE (OUTPATIENT)
Dept: FAMILY MEDICINE | Facility: CLINIC | Age: 67
End: 2022-03-25
Payer: MEDICARE

## 2022-03-25 NOTE — RESULT ENCOUNTER NOTE
Please inform patient that test result was within normal parameters.   Thank you.     Verito Cotto  Your vitamin D levels were in the normal range though on the lower end of normal ,recommend over the counter vitamin d.  Thanks.  Dr Ashby

## 2022-03-28 NOTE — TELEPHONE ENCOUNTER
Forwarding MyChart to PCP to advise please.  Vitamin D on the low end of normal, so you recommended OTC Vitamin D.  It sounds like patient is taking 25 mcg, which I think is about 1000 units daily. Should she increase to 2000 units?     Zahra Paniagua RN  [facility]

## 2022-06-23 ENCOUNTER — OFFICE VISIT (OUTPATIENT)
Dept: DERMATOLOGY | Facility: CLINIC | Age: 67
End: 2022-06-23
Attending: FAMILY MEDICINE
Payer: MEDICARE

## 2022-06-23 DIAGNOSIS — L82.1 SEBORRHEIC KERATOSIS: Primary | ICD-10-CM

## 2022-06-23 DIAGNOSIS — D22.9 MULTIPLE BENIGN NEVI: ICD-10-CM

## 2022-06-23 DIAGNOSIS — L81.4 LENTIGO: ICD-10-CM

## 2022-06-23 DIAGNOSIS — D18.01 CHERRY ANGIOMA: ICD-10-CM

## 2022-06-23 DIAGNOSIS — D48.5 NEOPLASM OF UNCERTAIN BEHAVIOR OF SKIN: ICD-10-CM

## 2022-06-23 DIAGNOSIS — L82.0 INFLAMED SEBORRHEIC KERATOSIS: ICD-10-CM

## 2022-06-23 PROCEDURE — 99203 OFFICE O/P NEW LOW 30 MIN: CPT | Mod: 25 | Performed by: PHYSICIAN ASSISTANT

## 2022-06-23 PROCEDURE — 11102 TANGNTL BX SKIN SINGLE LES: CPT | Mod: 59 | Performed by: PHYSICIAN ASSISTANT

## 2022-06-23 PROCEDURE — 88305 TISSUE EXAM BY PATHOLOGIST: CPT | Performed by: DERMATOLOGY

## 2022-06-23 PROCEDURE — 17110 DESTRUCTION B9 LES UP TO 14: CPT | Performed by: PHYSICIAN ASSISTANT

## 2022-06-23 ASSESSMENT — PAIN SCALES - GENERAL: PAINLEVEL: NO PAIN (0)

## 2022-06-23 NOTE — LETTER
6/23/2022         RE: Ina Otoole  [address] [facility] [address]        Dear Colleague,    Thank you for referring your patient, Ina Otoole, to the [facility]. Please see a copy of my visit note below.    Ina Otoole is an extremely pleasant 67 year old year old female patient here today for skin check. She note rash under breasts. She reports feels scaly and bumpy. Occasionally itchy. She notes scaly areas on body that occasionally are itchy, will rub on clothing.Patient has no other skin complaints today.  Remainder of the HPI, Meds, PMH, Allergies, FH, and SH was reviewed in chart.    Pertinent Hx:   No personal history of skin cancer.   Past Medical History:   Diagnosis Date     Arthritis 2018     Calculus of gallbladder with other cholecystitis, without mention of obstruction 6/7/2011     Goiter        Past Surgical History:   Procedure Laterality Date     ABDOMEN SURGERY       ARTHROSCOPY KNEE      left meniscus repair     ARTHROSCOPY KNEE RT/LT      left - meniscus repair     COLONOSCOPY N/A 8/24/2017    Procedure: COLONOSCOPY;  Colonoscopy  ;  Surgeon: Abhishek Escobedo MD;  Location: [facility]     COLONOSCOPY       KNEE SURGERY  9/2013    left knee replacement     LAPAROSCOPIC CHOLECYSTECTOMY  6/29/2011    Procedure:LAPAROSCOPIC CHOLECYSTECTOMY; Surgeon:SUMAYA ZAMORANO; Location:[facility]     LAPAROSCOPIC CHOLECYSTECTOMY       REPLACEMENT TOTAL KNEE Right 8/20/2018    Procedure: RIGHT TOTAL KNEE ARTHROPLASTY COMPUTER ASSIST;  Surgeon: Kevin Haji MD;  Location: [facility] OR;  Service:      STONE ANALYSIS  2011     THYROID BIOPSY       TOTAL KNEE ARTHROPLASTY Left      TUBAL LIGATION  1980        Family History   Problem Relation Age of Onset     Diabetes Mother      Hypertension Mother      Cerebrovascular Disease Mother      Cancer Mother         melanoma     Dementia Mother      Respiratory Father         copd     Cancer - colorectal Father       Bronchitis Father      Breast Cancer Maternal Grandmother      Diabetes Paternal Grandmother      Diabetes Brother      Hypertension Brother      Hypertension Sister      Multiple Sclerosis Sister      Neurologic Disorder Sister         ms     Other - See Comments Daughter         Transverse Myelitis     Asthma No family hx of      Prostate Cancer No family hx of        Social History     Socioeconomic History     Marital status:      Spouse name: Not on file     Number of children: Not on file     Years of education: Not on file     Highest education level: Not on file   Occupational History     Not on file   Tobacco Use     Smoking status: Former Smoker     Packs/day: 0.00     Years: 0.00     Pack years: 0.00     Quit date: 2000     Years since quittin.3     Smokeless tobacco: Never Used   Vaping Use     Vaping Use: Never used   Substance and Sexual Activity     Alcohol use: Yes     Comment: only for special occassions     Drug use: No     Sexual activity: Never   Other Topics Concern     Parent/sibling w/ CABG, MI or angioplasty before 65F 55M? No   Social History Narrative    Working at [employer]  [employer]    .      2 children    2 grandchild     Social Determinants of Health     Financial Resource Strain: Not on file   Food Insecurity: Not on file   Transportation Needs: Not on file   Physical Activity: Not on file   Stress: Not on file   Social Connections: Not on file   Intimate Partner Violence: Not on file   Housing Stability: Not on file       Outpatient Encounter Medications as of 2022   Medication Sig Dispense Refill     aspirin (ASA) 81 MG EC tablet Take 1 tablet (81 mg) by mouth daily       Cholecalciferol (VITAMIN D3 PO) Take 25 mcg by mouth daily       clobetasol (TEMOVATE) 0.05 % external ointment Apply topically 2 times daily 30 g 0     famotidine (PEPCID) 20 MG tablet Take 1 tablet (20 mg) by mouth 2 times daily 30 tablet 6     Ibuprofen (ADVIL PO)  Take 400 mg by mouth 2 times daily       multivitamin (ONE-DAILY) tablet Take 1 tablet by mouth daily Also has Vitamin D 25 mcg in the vitamin.       senna-docusate (SENOKOT-S;PERICOLACE) 8.6-50 MG per tablet Take 2 tablets by mouth 2 times daily        No facility-administered encounter medications on file as of 6/23/2022.             O:   NAD, WDWN, Alert & Oriented, Mood & Affect wnl, Vitals stable   Here today alone   There were no vitals taken for this visit.   General appearance normal   Vitals stable   Alert, oriented and in no acute distress     1.0 cm pink pearly papule on right mid shin   Stuck on papules and brown macules on trunk and ext   Red papules on trunk  Brown papules and macules with regular pigment network and borders on torso and extremities     The remainder of skin exam is normal      Eyes: Conjunctivae/lids:Normal     ENT: Lips: normal    MSK:Normal    Cardiovascular: peripheral edema none    Pulm: Breathing Normal    Neuro/Psych: Orientation:Alert and Orientedx3 ; Mood/Affect:normal   A/P:  1. R/O BCC on right mid shin  TANGENTIAL BIOPSY SENT OUT:  After consent, anesthesia with LEC and prep, tangential excision performed and specimen sent out for permanent section histology.  No complications and routine wound care. Patient told to call our office in 1-2 weeks for result.      2. Inflamed seborrheic keratoses on right calf, left thigh, left posterior knee, back x 8  LN2:  Treated with LN2 for 5s for 1-2 cycles. Warned risks of blistering, pain, pigment change, scarring, and incomplete resolution.  Advised patient to return if lesions do not completely resolve.  Wound care sheet given.  3. Seborrheic keratosis, lentigo, angioma, benign nevi   It was a pleasure speaking to Ina Otoole today.  No rash seen on chest, just seborrheic keratoses   BENIGN LESIONS DISCUSSED WITH PATIENT:  I discussed the specifics of tumor, prognosis, and genetics of benign lesions.  I explained that treatment  of these lesions would be purely cosmetic and not medically neccessary.  I discussed with patient different removal options including excision, cautery and /or laser.      Nature and genetics of benign skin lesions dicussed with patient.  Signs and Symptoms of skin cancer discussed with patient.  ABCDEs of melanoma reviewed with patient.  Patient encouraged to perform monthly skin exams.  UV precautions reviewed with patient.  Risks of non-melanoma skin cancer discussed with patient   Return to clinic pending biopsy results.         Again, thank you for allowing me to participate in the care of your patient.        Sincerely,        Tiffany Reid PA-C

## 2022-06-24 LAB
PATH REPORT.COMMENTS IMP SPEC: ABNORMAL
PATH REPORT.COMMENTS IMP SPEC: ABNORMAL
PATH REPORT.COMMENTS IMP SPEC: YES
PATH REPORT.FINAL DX SPEC: ABNORMAL
PATH REPORT.GROSS SPEC: ABNORMAL
PATH REPORT.MICROSCOPIC SPEC OTHER STN: ABNORMAL
PATH REPORT.RELEVANT HX SPEC: ABNORMAL

## 2022-06-27 NOTE — PROGRESS NOTES
Ina Otoole is an extremely pleasant 67 year old year old female patient here today for skin check. She note rash under breasts. She reports feels scaly and bumpy. Occasionally itchy. She notes scaly areas on body that occasionally are itchy, will rub on clothing.Patient has no other skin complaints today.  Remainder of the HPI, Meds, PMH, Allergies, FH, and SH was reviewed in chart.    Pertinent Hx:   No personal history of skin cancer.   Past Medical History:   Diagnosis Date     Arthritis 2018     Calculus of gallbladder with other cholecystitis, without mention of obstruction 6/7/2011     Goiter        Past Surgical History:   Procedure Laterality Date     ABDOMEN SURGERY       ARTHROSCOPY KNEE      left meniscus repair     ARTHROSCOPY KNEE RT/LT      left - meniscus repair     COLONOSCOPY N/A 8/24/2017    Procedure: COLONOSCOPY;  Colonoscopy  ;  Surgeon: Abhishek Escobedo MD;  Location: [facility]     COLONOSCOPY       KNEE SURGERY  9/2013    left knee replacement     LAPAROSCOPIC CHOLECYSTECTOMY  6/29/2011    Procedure:LAPAROSCOPIC CHOLECYSTECTOMY; Surgeon:SUMAYA ZAMORANO; Location:[facility]     LAPAROSCOPIC CHOLECYSTECTOMY       REPLACEMENT TOTAL KNEE Right 8/20/2018    Procedure: RIGHT TOTAL KNEE ARTHROPLASTY COMPUTER ASSIST;  Surgeon: Kevin Haji MD;  Location: [facility] OR;  Service:      STONE ANALYSIS  2011     THYROID BIOPSY       TOTAL KNEE ARTHROPLASTY Left      TUBAL LIGATION  1980        Family History   Problem Relation Age of Onset     Diabetes Mother      Hypertension Mother      Cerebrovascular Disease Mother      Cancer Mother         melanoma     Dementia Mother      Respiratory Father         copd     Cancer - colorectal Father      Bronchitis Father      Breast Cancer Maternal Grandmother      Diabetes Paternal Grandmother      Diabetes Brother      Hypertension Brother      Hypertension Sister      Multiple Sclerosis Sister      Neurologic Disorder Sister         ms      Other - See Comments Daughter         Transverse Myelitis     Asthma No family hx of      Prostate Cancer No family hx of        Social History     Socioeconomic History     Marital status:      Spouse name: Not on file     Number of children: Not on file     Years of education: Not on file     Highest education level: Not on file   Occupational History     Not on file   Tobacco Use     Smoking status: Former Smoker     Packs/day: 0.00     Years: 0.00     Pack years: 0.00     Quit date: 2000     Years since quittin.3     Smokeless tobacco: Never Used   Vaping Use     Vaping Use: Never used   Substance and Sexual Activity     Alcohol use: Yes     Comment: only for special occassions     Drug use: No     Sexual activity: Never   Other Topics Concern     Parent/sibling w/ CABG, MI or angioplasty before 65F 55M? No   Social History Narrative    Working at [employer]  [employer]    .      2 children    2 grandchild     Social Determinants of Health     Financial Resource Strain: Not on file   Food Insecurity: Not on file   Transportation Needs: Not on file   Physical Activity: Not on file   Stress: Not on file   Social Connections: Not on file   Intimate Partner Violence: Not on file   Housing Stability: Not on file       Outpatient Encounter Medications as of 2022   Medication Sig Dispense Refill     aspirin (ASA) 81 MG EC tablet Take 1 tablet (81 mg) by mouth daily       Cholecalciferol (VITAMIN D3 PO) Take 25 mcg by mouth daily       clobetasol (TEMOVATE) 0.05 % external ointment Apply topically 2 times daily 30 g 0     famotidine (PEPCID) 20 MG tablet Take 1 tablet (20 mg) by mouth 2 times daily 30 tablet 6     Ibuprofen (ADVIL PO) Take 400 mg by mouth 2 times daily       multivitamin (ONE-DAILY) tablet Take 1 tablet by mouth daily Also has Vitamin D 25 mcg in the vitamin.       senna-docusate (SENOKOT-S;PERICOLACE) 8.6-50 MG per tablet Take 2 tablets by mouth 2 times daily         No facility-administered encounter medications on file as of 6/23/2022.             O:   NAD, WDWN, Alert & Oriented, Mood & Affect wnl, Vitals stable   Here today alone   There were no vitals taken for this visit.   General appearance normal   Vitals stable   Alert, oriented and in no acute distress     1.0 cm pink pearly papule on right mid shin   Stuck on papules and brown macules on trunk and ext   Red papules on trunk  Brown papules and macules with regular pigment network and borders on torso and extremities     The remainder of skin exam is normal      Eyes: Conjunctivae/lids:Normal     ENT: Lips: normal    MSK:Normal    Cardiovascular: peripheral edema none    Pulm: Breathing Normal    Neuro/Psych: Orientation:Alert and Orientedx3 ; Mood/Affect:normal   A/P:  1. R/O BCC on right mid shin  TANGENTIAL BIOPSY SENT OUT:  After consent, anesthesia with LEC and prep, tangential excision performed and specimen sent out for permanent section histology.  No complications and routine wound care. Patient told to call our office in 1-2 weeks for result.      2. Inflamed seborrheic keratoses on right calf, left thigh, left posterior knee, back x 8  LN2:  Treated with LN2 for 5s for 1-2 cycles. Warned risks of blistering, pain, pigment change, scarring, and incomplete resolution.  Advised patient to return if lesions do not completely resolve.  Wound care sheet given.  3. Seborrheic keratosis, lentigo, angioma, benign nevi   It was a pleasure speaking to Ina Otoole today.  No rash seen on chest, just seborrheic keratoses   BENIGN LESIONS DISCUSSED WITH PATIENT:  I discussed the specifics of tumor, prognosis, and genetics of benign lesions.  I explained that treatment of these lesions would be purely cosmetic and not medically neccessary.  I discussed with patient different removal options including excision, cautery and /or laser.      Nature and genetics of benign skin lesions dicussed with patient.  Signs and  Symptoms of skin cancer discussed with patient.  ABCDEs of melanoma reviewed with patient.  Patient encouraged to perform monthly skin exams.  UV precautions reviewed with patient.  Risks of non-melanoma skin cancer discussed with patient   Return to clinic pending biopsy results.

## 2022-07-22 ENCOUNTER — OFFICE VISIT (OUTPATIENT)
Dept: DERMATOLOGY | Facility: CLINIC | Age: 67
End: 2022-07-22
Payer: MEDICARE

## 2022-07-22 DIAGNOSIS — L90.0 LICHEN SCLEROSUS: Primary | ICD-10-CM

## 2022-07-22 PROCEDURE — 99213 OFFICE O/P EST LOW 20 MIN: CPT | Performed by: PHYSICIAN ASSISTANT

## 2022-07-22 RX ORDER — CLOBETASOL PROPIONATE 0.5 MG/G
OINTMENT TOPICAL 2 TIMES DAILY
Qty: 30 G | Refills: 1 | Status: SHIPPED | OUTPATIENT
Start: 2022-07-22 | End: 2023-01-20

## 2022-07-22 ASSESSMENT — PAIN SCALES - GENERAL: PAINLEVEL: NO PAIN (0)

## 2022-07-22 NOTE — LETTER
7/22/2022         RE: Ina Otoole  [address] [facility] [address]        Dear Colleague,    Thank you for referring your patient, Ina Otoole, to the [facility]. Please see a copy of my visit note below.    Ina Otoole is an extremely pleasant 67 year old year old female patient here today for recheck lichen sclerosus. Her biopsy came in 2020 done by gynecology returned as  Mature squamous epithelium with mild hyperkeratosis, focal mild increase in pigmentation in basal layer of   the epidermis without an increase in melanocytes and negative for atypia, dysplasia and malignancy., Focal squamous atrophy.  She has been using clobetasol twice daily for last few weeks notes her skin is much better.   Patient has no other skin complaints today.  Remainder of the HPI, Meds, PMH, Allergies, FH, and SH was reviewed in chart.    Past Medical History:   Diagnosis Date     Arthritis 2018     Calculus of gallbladder with other cholecystitis, without mention of obstruction 6/7/2011     Goiter        Past Surgical History:   Procedure Laterality Date     ABDOMEN SURGERY       ARTHROSCOPY KNEE      left meniscus repair     ARTHROSCOPY KNEE RT/LT      left - meniscus repair     COLONOSCOPY N/A 8/24/2017    Procedure: COLONOSCOPY;  Colonoscopy  ;  Surgeon: Abhishek Escobedo MD;  Location: [facility]     COLONOSCOPY       KNEE SURGERY  9/2013    left knee replacement     LAPAROSCOPIC CHOLECYSTECTOMY  6/29/2011    Procedure:LAPAROSCOPIC CHOLECYSTECTOMY; Surgeon:SUMAYA ZAMORANO; Location:[facility]     LAPAROSCOPIC CHOLECYSTECTOMY       REPLACEMENT TOTAL KNEE Right 8/20/2018    Procedure: RIGHT TOTAL KNEE ARTHROPLASTY COMPUTER ASSIST;  Surgeon: Kevin Haji MD;  Location: [facility] OR;  Service:      STONE ANALYSIS  2011     THYROID BIOPSY       TOTAL KNEE ARTHROPLASTY Left      TUBAL LIGATION  1980        Family History   Problem Relation Age of Onset     Diabetes Mother       Hypertension Mother      Cerebrovascular Disease Mother      Cancer Mother         melanoma     Dementia Mother      Respiratory Father         copd     Cancer - colorectal Father      Bronchitis Father      Breast Cancer Maternal Grandmother      Diabetes Paternal Grandmother      Diabetes Brother      Hypertension Brother      Hypertension Sister      Multiple Sclerosis Sister      Neurologic Disorder Sister         ms     Other - See Comments Daughter         Transverse Myelitis     Asthma No family hx of      Prostate Cancer No family hx of        Social History     Socioeconomic History     Marital status:      Spouse name: Not on file     Number of children: Not on file     Years of education: Not on file     Highest education level: Not on file   Occupational History     Not on file   Tobacco Use     Smoking status: Former Smoker     Packs/day: 0.00     Years: 0.00     Pack years: 0.00     Quit date: 2000     Years since quittin.4     Smokeless tobacco: Never Used   Vaping Use     Vaping Use: Never used   Substance and Sexual Activity     Alcohol use: Yes     Comment: only for special occassions     Drug use: No     Sexual activity: Never   Other Topics Concern     Parent/sibling w/ CABG, MI or angioplasty before 65F 55M? No   Social History Narrative    Working at [employer]    .      2 children    2 grandchild     Social Determinants of Health     Financial Resource Strain: Not on file   Food Insecurity: Not on file   Transportation Needs: Not on file   Physical Activity: Not on file   Stress: Not on file   Social Connections: Not on file   Intimate Partner Violence: Not on file   Housing Stability: Not on file       Outpatient Encounter Medications as of 2022   Medication Sig Dispense Refill     aspirin (ASA) 81 MG EC tablet Take 1 tablet (81 mg) by mouth daily       Cholecalciferol (VITAMIN D3 PO) Take 25 mcg by mouth daily       clobetasol (TEMOVATE)  0.05 % external ointment Apply topically 2 times daily 30 g 1     famotidine (PEPCID) 20 MG tablet Take 1 tablet (20 mg) by mouth 2 times daily 30 tablet 6     Ibuprofen (ADVIL PO) Take 400 mg by mouth 2 times daily       multivitamin (ONE-DAILY) tablet Take 1 tablet by mouth daily Also has Vitamin D 25 mcg in the vitamin.       senna-docusate (SENOKOT-S;PERICOLACE) 8.6-50 MG per tablet Take 2 tablets by mouth 2 times daily        [DISCONTINUED] clobetasol (TEMOVATE) 0.05 % external ointment Apply topically 2 times daily 30 g 0     No facility-administered encounter medications on file as of 7/22/2022.             O:   NAD, WDWN, Alert & Oriented, Mood & Affect wnl, Vitals stable   Here today alone   There were no vitals taken for this visit.   General appearance normal   Vitals stable   Alert, oriented and in no acute distress     Labia mucosa improved, mild atrophy    Eyes: Conjunctivae/lids:Normal     ENT: Lips: normal    MSK:Normal    Pulm: Breathing Normal    Neuro/Psych: Orientation:Alert and Orientedx3 ; Mood/Affect:normal  A/P:  1. Early lichen sclerosus   Well controlled on clobetasol, decrease to 2-3 times weekly.   Use aquaphor or vaseline daily.   Use mild cleanser like cerave, cetaphil, or dove sensitive skin cleanser.       Again, thank you for allowing me to participate in the care of your patient.        Sincerely,        Tiffany Reid PA-C

## 2022-07-25 NOTE — PROGRESS NOTES
Ina Otoole is an extremely pleasant 67 year old year old female patient here today for recheck lichen sclerosus. Her biopsy came in 2020 done by gynecology returned as  Mature squamous epithelium with mild hyperkeratosis, focal mild increase in pigmentation in basal layer of   the epidermis without an increase in melanocytes and negative for atypia, dysplasia and malignancy., Focal squamous atrophy.  She has been using clobetasol twice daily for last few weeks notes her skin is much better.   Patient has no other skin complaints today.  Remainder of the HPI, Meds, PMH, Allergies, FH, and SH was reviewed in chart.    Past Medical History:   Diagnosis Date     Arthritis 2018     Calculus of gallbladder with other cholecystitis, without mention of obstruction 6/7/2011     Goiter        Past Surgical History:   Procedure Laterality Date     ABDOMEN SURGERY       ARTHROSCOPY KNEE      left meniscus repair     ARTHROSCOPY KNEE RT/LT      left - meniscus repair     COLONOSCOPY N/A 8/24/2017    Procedure: COLONOSCOPY;  Colonoscopy  ;  Surgeon: Abhishek Escobedo MD;  Location: [facility]     COLONOSCOPY       KNEE SURGERY  9/2013    left knee replacement     LAPAROSCOPIC CHOLECYSTECTOMY  6/29/2011    Procedure:LAPAROSCOPIC CHOLECYSTECTOMY; Surgeon:SUMAYA ZAMORANO; Location:[facility]     LAPAROSCOPIC CHOLECYSTECTOMY       REPLACEMENT TOTAL KNEE Right 8/20/2018    Procedure: RIGHT TOTAL KNEE ARTHROPLASTY COMPUTER ASSIST;  Surgeon: Kevin Haji MD;  Location: [facility] OR;  Service:      STONE ANALYSIS  2011     THYROID BIOPSY       TOTAL KNEE ARTHROPLASTY Left      TUBAL LIGATION  1980        Family History   Problem Relation Age of Onset     Diabetes Mother      Hypertension Mother      Cerebrovascular Disease Mother      Cancer Mother         melanoma     Dementia Mother      Respiratory Father         copd     Cancer - colorectal Father      Bronchitis Father      Breast Cancer Maternal Grandmother       Diabetes Paternal Grandmother      Diabetes Brother      Hypertension Brother      Hypertension Sister      Multiple Sclerosis Sister      Neurologic Disorder Sister         ms     Other - See Comments Daughter         Transverse Myelitis     Asthma No family hx of      Prostate Cancer No family hx of        Social History     Socioeconomic History     Marital status:      Spouse name: Not on file     Number of children: Not on file     Years of education: Not on file     Highest education level: Not on file   Occupational History     Not on file   Tobacco Use     Smoking status: Former Smoker     Packs/day: 0.00     Years: 0.00     Pack years: 0.00     Quit date: 2000     Years since quittin.4     Smokeless tobacco: Never Used   Vaping Use     Vaping Use: Never used   Substance and Sexual Activity     Alcohol use: Yes     Comment: only for special occassions     Drug use: No     Sexual activity: Never   Other Topics Concern     Parent/sibling w/ CABG, MI or angioplasty before 65F 55M? No   Social History Narrative    Working at [employer]    .      2 children    2 grandchild     Social Determinants of Health     Financial Resource Strain: Not on file   Food Insecurity: Not on file   Transportation Needs: Not on file   Physical Activity: Not on file   Stress: Not on file   Social Connections: Not on file   Intimate Partner Violence: Not on file   Housing Stability: Not on file       Outpatient Encounter Medications as of 2022   Medication Sig Dispense Refill     aspirin (ASA) 81 MG EC tablet Take 1 tablet (81 mg) by mouth daily       Cholecalciferol (VITAMIN D3 PO) Take 25 mcg by mouth daily       clobetasol (TEMOVATE) 0.05 % external ointment Apply topically 2 times daily 30 g 1     famotidine (PEPCID) 20 MG tablet Take 1 tablet (20 mg) by mouth 2 times daily 30 tablet 6     Ibuprofen (ADVIL PO) Take 400 mg by mouth 2 times daily       multivitamin (ONE-DAILY)  tablet Take 1 tablet by mouth daily Also has Vitamin D 25 mcg in the vitamin.       senna-docusate (SENOKOT-S;PERICOLACE) 8.6-50 MG per tablet Take 2 tablets by mouth 2 times daily        [DISCONTINUED] clobetasol (TEMOVATE) 0.05 % external ointment Apply topically 2 times daily 30 g 0     No facility-administered encounter medications on file as of 7/22/2022.             O:   NAD, WDWN, Alert & Oriented, Mood & Affect wnl, Vitals stable   Here today alone   There were no vitals taken for this visit.   General appearance normal   Vitals stable   Alert, oriented and in no acute distress     Labia mucosa improved, mild atrophy    Eyes: Conjunctivae/lids:Normal     ENT: Lips: normal    MSK:Normal    Pulm: Breathing Normal    Neuro/Psych: Orientation:Alert and Orientedx3 ; Mood/Affect:normal  A/P:  1. Early lichen sclerosus   Well controlled on clobetasol, decrease to 2-3 times weekly.   Use aquaphor or vaseline daily.   Use mild cleanser like cerave, cetaphil, or dove sensitive skin cleanser.

## 2022-09-02 ENCOUNTER — TELEPHONE (OUTPATIENT)
Dept: FAMILY MEDICINE | Facility: CLINIC | Age: 67
End: 2022-09-02

## 2022-09-04 ENCOUNTER — VIRTUAL VISIT (OUTPATIENT)
Dept: URGENT CARE | Facility: CLINIC | Age: 67
End: 2022-09-04
Payer: MEDICARE

## 2022-09-04 DIAGNOSIS — U07.1 CLINICAL DIAGNOSIS OF COVID-19: Primary | ICD-10-CM

## 2022-09-04 PROCEDURE — 99213 OFFICE O/P EST LOW 20 MIN: CPT | Mod: CS

## 2022-09-04 NOTE — PROGRESS NOTES
"Kirti is a 67 year old who is being evaluated via a billable video visit.      Tested + on Friday.  Cold sx Thursday.  Sx started a week ago-- dehydration and exhaustion while in [place].  Not vaccinated.  Worried about medications containing \"fetal cells\"    How would you like to obtain your AVS? MyChart  If the video visit is dropped, the invitation should be resent by: Send to e-mail at: [email or web address]  Will anyone else be joining your video visit? No        Assessment & Plan     Clinical diagnosis of COVID-19    - nirmatrelvir and ritonavir (PAXLOVID) therapy pack; Take 3 tablets by mouth 2 times daily for 5 days (Take 2 Nirmatrelvir tablets and 1 Ritonavir tablet twice daily for 5 days)04558}     COVID-19 positive patient.  Encounter for consideration of medication intervention. Patient does qualify for a prescription. Full discussion with patient including medication options, risks and benefits. Potential drug interactions reviewed with patient.     Treatment Planned Paxlovid sent to [facility]    Temporary change to home medications:  None     Estimated body mass index is 29.63 kg/m  as calculated from the following:    Height as of 3/22/22: 1.772 m (5' 9.75\").    Weight as of 3/22/22: 93 kg (205 lb).  GFR Estimate   Date Value Ref Range Status   04/14/2021 63 >60 mL/min/[1.73_m2] Final     Comment:     Non  GFR Calc  Starting 12/18/2018, serum creatinine based estimated GFR (eGFR) will be   calculated using the Chronic Kidney Disease Epidemiology Collaboration   (CKD-EPI) equation.       Emelyn Lopez MD  Virtual Urgent Care  [facility] VIRTUAL URGENT CARE    Subjective   Kirti is a 67 year old, presenting for the following health issues:  No chief complaint on file.      HPI           Review of Systems   Constitutional, HEENT, cardiovascular, pulmonary, GI, , musculoskeletal, neuro, skin, endocrine and psych systems are negative, except as otherwise noted.    "   Objective           Vitals:  No vitals were obtained today due to virtual visit.    Physical Exam   GENERAL: Healthy, alert and no distress  EYES: Eyes grossly normal to inspection.  No discharge or erythema, or obvious scleral/conjunctival abnormalities.  RESP: No audible wheeze, cough, or visible cyanosis.  No visible retractions or increased work of breathing.    SKIN: Visible skin clear. No significant rash, abnormal pigmentation or lesions.  NEURO: Cranial nerves grossly intact.  Mentation and speech appropriate for age.  PSYCH: Mentation appears normal, affect normal/bright, judgement and insight intact, normal speech and appearance well-groomed.                Video-Visit Details    Video Start Time: 8:52 AM    Type of service:  Video Visit    Video End Time:8:59 AM    Originating Location (pt. Location): Home    Distant Location (provider location):  [facility]     Platform used for Video Visit: [employer]

## 2022-09-19 ENCOUNTER — OFFICE VISIT (OUTPATIENT)
Dept: DERMATOLOGY | Facility: CLINIC | Age: 67
End: 2022-09-19
Payer: MEDICARE

## 2022-09-19 VITALS — SYSTOLIC BLOOD PRESSURE: 140 MMHG | HEART RATE: 70 BPM | DIASTOLIC BLOOD PRESSURE: 87 MMHG | OXYGEN SATURATION: 96 %

## 2022-09-19 DIAGNOSIS — C44.712 BASAL CELL CARCINOMA (BCC) OF RIGHT LOWER LEG: Primary | ICD-10-CM

## 2022-09-19 PROCEDURE — 17311 MOHS 1 STAGE H/N/HF/G: CPT | Performed by: DERMATOLOGY

## 2022-09-19 PROCEDURE — 99207 PR NO CHARGE LOS: CPT | Performed by: DERMATOLOGY

## 2022-09-19 ASSESSMENT — PAIN SCALES - GENERAL: PAINLEVEL: NO PAIN (0)

## 2022-09-19 NOTE — PATIENT INSTRUCTIONS
Open Wound Care     for ______________        No strenuous activity for 48 hours    Take Tylenol as needed for discomfort.                                                .         Do not drink alcoholic beverages for 48 hours.    Keep the pressure bandage in place for 24 hours. If the bandage becomes blood tinged or loose, reinforce it with gauze and tape.        (Refer to the reverse side of this page for management of bleeding).    Remove bandage in 24 hours and begin wound care as follows:     Clean area with tap water using a Q tip or gauze pad, (shower / bathe normally)  Dry wound with Q tip or gauze pad  Apply Aquaphor, Vaseline, Polysporin or Bacitracin Ointment with a Q tip  Do NOT use Neosporin Ointment *  Cover the wound with a band-aid or nonstick gauze pad and paper tape.  Repeat wound care once a day until wound is completely healed.    It is an old wives tale that a wound heals better when it is exposed to air and allowed to dry out. The wound will heal faster with a better cosmetic result if it is kept moist with ointment and covered with a bandage.  Do not let the wound dry out.      Supplies Needed:                Qtips or gauze pads                Polysporin or Bacitracin Ointment                Bandaids or nonstick gauze pads and paper tape    Wound care kits and brown paper tape are available for purchase at   the pharmacy.    BLEEDING:    Use tightly rolled up gauze or cloth to apply direct pressure over the bandage for 20   minutes.  Reapply pressure for an additional 20 minutes if necessary  Call the office or go to the nearest emergency room if pressure fails to stop the bleeding.  Use additional gauze and tape to maintain pressure once the bleeding has stopped.  Begin wound care 24 hours after surgery as directed.                  WOUND HEALING    One week after surgery a pink / red halo will form around the outside of the wound.   This is new skin.  The center of the wound will appear  yellowish white and produce some drainage.  The pink halo will slowly migrate in toward the center of the wound until the wound is covered with new shiny pink skin.  There will be no more drainage when the wound is completely healed.  It will take six months to one year for the redness to fade.  The scar may be itchy, tight and sensitive to extreme temperatures for a year after the surgery.  Massaging the area several times a day for several minutes after the wound is completely healed will help the scar soften and normalize faster. Begin massage only after healing is complete.      In case of emergency call: Dr Adams: [phone]    [facility]: [phone]    [facility]:[phone]

## 2022-09-19 NOTE — PROGRESS NOTES
Surgical Office Location :   [facility]  [address] [place] [address]

## 2022-09-19 NOTE — LETTER
9/19/2022         RE: Ina Otoole  [address]  [facility] [address]        Dear Colleague,    Thank you for referring your patient, Ina Otoole, to the [facility]. Please see a copy of my visit note below.    Surgical Office Location :   [facility]  [facility] [place] [address]      Ina Otoole is an extremely pleasant 67 year old year old female patient here today for evaluation and managment of basal cell carcinoma on right shin.  Patient has no other skin complaints today.  Remainder of the HPI, Meds, PMH, Allergies, FH, and SH was reviewed in chart.      Past Medical History:   Diagnosis Date     Arthritis 2018     Basal cell carcinoma      Calculus of gallbladder with other cholecystitis, without mention of obstruction 06/07/2011     Goiter        Past Surgical History:   Procedure Laterality Date     ABDOMEN SURGERY       ARTHROSCOPY KNEE      left meniscus repair     ARTHROSCOPY KNEE RT/LT      left - meniscus repair     COLONOSCOPY N/A 8/24/2017    Procedure: COLONOSCOPY;  Colonoscopy  ;  Surgeon: Abhishek Escobedo MD;  Location: [facility]     COLONOSCOPY       KNEE SURGERY  9/2013    left knee replacement     LAPAROSCOPIC CHOLECYSTECTOMY  6/29/2011    Procedure:LAPAROSCOPIC CHOLECYSTECTOMY; Surgeon:SUMAYA ZAMORANO; Location:[facility]     LAPAROSCOPIC CHOLECYSTECTOMY       REPLACEMENT TOTAL KNEE Right 8/20/2018    Procedure: RIGHT TOTAL KNEE ARTHROPLASTY COMPUTER ASSIST;  Surgeon: Kevin Haji MD;  Location: [facility] OR;  Service:      STONE ANALYSIS  2011     THYROID BIOPSY       TOTAL KNEE ARTHROPLASTY Left      TUBAL LIGATION  1980        Family History   Problem Relation Age of Onset     Diabetes Mother      Hypertension Mother      Cerebrovascular Disease Mother      Cancer Mother         melanoma     Dementia Mother      Basal cell carcinoma Mother      Squamous cell carcinoma Mother      Respiratory Father          copd     Cancer - colorectal Father      Bronchitis Father      Hypertension Sister      Multiple Sclerosis Sister      Neurologic Disorder Sister         ms     Diabetes Brother      Hypertension Brother      Breast Cancer Maternal Grandmother      Diabetes Paternal Grandmother      Other - See Comments Daughter         Transverse Myelitis     Asthma No family hx of      Prostate Cancer No family hx of        Social History     Socioeconomic History     Marital status:      Spouse name: Not on file     Number of children: Not on file     Years of education: Not on file     Highest education level: Not on file   Occupational History     Not on file   Tobacco Use     Smoking status: Former Smoker     Packs/day: 0.00     Years: 0.00     Pack years: 0.00     Quit date: 2000     Years since quittin.5     Smokeless tobacco: Never Used   Vaping Use     Vaping Use: Never used   Substance and Sexual Activity     Alcohol use: Yes     Comment: only for special occassions     Drug use: No     Sexual activity: Never   Other Topics Concern     Parent/sibling w/ CABG, MI or angioplasty before 65F 55M? No   Social History Narrative    Working at [employer]  [employer], [employer]    .      2 children    2 grandchild     Social Determinants of Health     Financial Resource Strain: Not on file   Food Insecurity: Not on file   Transportation Needs: Not on file   Physical Activity: Not on file   Stress: Not on file   Social Connections: Not on file   Intimate Partner Violence: Not on file   Housing Stability: Not on file       Outpatient Encounter Medications as of 2022   Medication Sig Dispense Refill     aspirin (ASA) 81 MG EC tablet Take 1 tablet (81 mg) by mouth daily       Cholecalciferol (VITAMIN D3 PO) Take 25 mcg by mouth daily       clobetasol (TEMOVATE) 0.05 % external ointment Apply topically 2 times daily 30 g 1     famotidine (PEPCID) 20 MG tablet Take 1 tablet (20 mg) by mouth 2 times daily  30 tablet 6     Ibuprofen (ADVIL PO) Take 400 mg by mouth 2 times daily       multivitamin (ONE-DAILY) tablet Take 1 tablet by mouth daily Also has Vitamin D 25 mcg in the vitamin.       senna-docusate (SENOKOT-S;PERICOLACE) 8.6-50 MG per tablet Take 2 tablets by mouth 2 times daily        No facility-administered encounter medications on file as of 9/19/2022.             O:   NAD, WDWN, Alert & Oriented, Mood & Affect wnl, Vitals stable   Here today alone   BP (!) 140/87   Pulse 70   SpO2 96%    General appearance normal   Vitals stable   Alert, oriented and in no acute distress     R shin 1.1cm scaly papule       Eyes: Conjunctivae/lids:Normal     ENT: Lips, buccal mucosa, tongue: normal    MSK:Normal    Cardiovascular: peripheral edema none    Pulm: Breathing Normal    Neuro/Psych: Orientation:Alert and Orientedx3 ; Mood/Affect:normal       A/P:  1. R shin basal cell carcinoma   MOHS:   Location    The rationale for Mohs surgery was discussed with the patient and consent was obtained.  The risks and benefits as well as alternatives to therapy were discussed, in detail.  Specifically, the risks of infection, scarring, bleeding, prolonged wound healing, incomplete removal, allergy to anesthesia, nerve injury and recurrence were addressed.  Indication for Mohs was Location. Prior to the procedure, the treatment site was clearly identified and, if available, confirmed with previous photos and confirmed by the patient   All components of the Universal Protocol/PAUSE rule were completed.  The Mohs surgeon operated in two distinct and integrated capacities as the surgeon and pathologist.      The area was prepped with Betasept.  A rim of normal appearing skin was marked circumferentially around the lesion.  The area was infiltrated with local anesthesia.  The tumor was first debulked to remove all clinically apparent tumor.  An incision following the standard Mohs approach was done and the specimen was oriented,mapped  and placed in 1 block(s).  Each specimen was then chromacoded and processed in the Mohs laboratory using standard Mohs technique and submitted for frozen section histology.  Frozen section analysis showed no residual tumor but CLEAR MARGINS.      The tumor was excised using standard Mohs technique in 1 stages(s).  CLEAR MARGINS OBTAINED and Final defect size was 1.5 cm.     We discussed the options for wound management in full with the patient including risks/benefits/ possible outcomes.        REPAIR SECOND INTENT: We discussed the options for wound management in full with the patient including risks/benefits/possible outcomes. Decision made to allow the wound to heal by second intention. Cautery was used for for hemostasis. EBL minimal; complications none; wound care routine.  The patient was discharged in good condition and will return in one month or prn for wound evaluation.  It was a pleasure speaking to Ina Otoole today.  Previous clinic notes and pertinent laboratory tests were reviewed prior to Ina Otoole's visit.  Signs and Symptoms of skin cancer discussed with patient.  Patient encouraged to perform monthly skin exams.  UV precautions reviewed with patient.  Risks of non-melanoma skin cancer discussed with patient   Return to clinic 6 months        Again, thank you for allowing me to participate in the care of your patient.        Sincerely,        Isaiah Adams MD

## 2022-09-19 NOTE — PROGRESS NOTES
Ina Otoole is an extremely pleasant 67 year old year old female patient here today for evaluation and managment of basal cell carcinoma on right shin.  Patient has no other skin complaints today.  Remainder of the HPI, Meds, PMH, Allergies, FH, and SH was reviewed in chart.      Past Medical History:   Diagnosis Date     Arthritis 2018     Basal cell carcinoma      Calculus of gallbladder with other cholecystitis, without mention of obstruction 06/07/2011     Goiter        Past Surgical History:   Procedure Laterality Date     ABDOMEN SURGERY       ARTHROSCOPY KNEE      left meniscus repair     ARTHROSCOPY KNEE RT/LT      left - meniscus repair     COLONOSCOPY N/A 8/24/2017    Procedure: COLONOSCOPY;  Colonoscopy  ;  Surgeon: Abhishek Escobedo MD;  Location: [facility]     COLONOSCOPY       KNEE SURGERY  9/2013    left knee replacement     LAPAROSCOPIC CHOLECYSTECTOMY  6/29/2011    Procedure:LAPAROSCOPIC CHOLECYSTECTOMY; Surgeon:SUMAYA ZAMORANO; Location:[facility]     LAPAROSCOPIC CHOLECYSTECTOMY       REPLACEMENT TOTAL KNEE Right 8/20/2018    Procedure: RIGHT TOTAL KNEE ARTHROPLASTY COMPUTER ASSIST;  Surgeon: Kevin Haji MD;  Location: [facility] OR;  Service:      STONE ANALYSIS  2011     THYROID BIOPSY       TOTAL KNEE ARTHROPLASTY Left      TUBAL LIGATION  1980        Family History   Problem Relation Age of Onset     Diabetes Mother      Hypertension Mother      Cerebrovascular Disease Mother      Cancer Mother         melanoma     Dementia Mother      Basal cell carcinoma Mother      Squamous cell carcinoma Mother      Respiratory Father         copd     Cancer - colorectal Father      Bronchitis Father      Hypertension Sister      Multiple Sclerosis Sister      Neurologic Disorder Sister         ms     Diabetes Brother      Hypertension Brother      Breast Cancer Maternal Grandmother      Diabetes Paternal Grandmother      Other - See Comments Daughter         Transverse Myelitis     Asthma  No family hx of      Prostate Cancer No family hx of        Social History     Socioeconomic History     Marital status:      Spouse name: Not on file     Number of children: Not on file     Years of education: Not on file     Highest education level: Not on file   Occupational History     Not on file   Tobacco Use     Smoking status: Former Smoker     Packs/day: 0.00     Years: 0.00     Pack years: 0.00     Quit date: 2000     Years since quittin.5     Smokeless tobacco: Never Used   Vaping Use     Vaping Use: Never used   Substance and Sexual Activity     Alcohol use: Yes     Comment: only for special occassions     Drug use: No     Sexual activity: Never   Other Topics Concern     Parent/sibling w/ CABG, MI or angioplasty before 65F 55M? No   Social History Narrative    Working at [employer]  [employer]    .      2 children    2 grandchild     Social Determinants of Health     Financial Resource Strain: Not on file   Food Insecurity: Not on file   Transportation Needs: Not on file   Physical Activity: Not on file   Stress: Not on file   Social Connections: Not on file   Intimate Partner Violence: Not on file   Housing Stability: Not on file       Outpatient Encounter Medications as of 2022   Medication Sig Dispense Refill     aspirin (ASA) 81 MG EC tablet Take 1 tablet (81 mg) by mouth daily       Cholecalciferol (VITAMIN D3 PO) Take 25 mcg by mouth daily       clobetasol (TEMOVATE) 0.05 % external ointment Apply topically 2 times daily 30 g 1     famotidine (PEPCID) 20 MG tablet Take 1 tablet (20 mg) by mouth 2 times daily 30 tablet 6     Ibuprofen (ADVIL PO) Take 400 mg by mouth 2 times daily       multivitamin (ONE-DAILY) tablet Take 1 tablet by mouth daily Also has Vitamin D 25 mcg in the vitamin.       senna-docusate (SENOKOT-S;PERICOLACE) 8.6-50 MG per tablet Take 2 tablets by mouth 2 times daily        No facility-administered encounter medications on file as of  9/19/2022.             O:   NAD, WDWN, Alert & Oriented, Mood & Affect wnl, Vitals stable   Here today alone   BP (!) 140/87   Pulse 70   SpO2 96%    General appearance normal   Vitals stable   Alert, oriented and in no acute distress     R shin 1.1cm scaly papule       Eyes: Conjunctivae/lids:Normal     ENT: Lips, buccal mucosa, tongue: normal    MSK:Normal    Cardiovascular: peripheral edema none    Pulm: Breathing Normal    Neuro/Psych: Orientation:Alert and Orientedx3 ; Mood/Affect:normal       A/P:  1. R shin basal cell carcinoma   MOHS:   Location    The rationale for Mohs surgery was discussed with the patient and consent was obtained.  The risks and benefits as well as alternatives to therapy were discussed, in detail.  Specifically, the risks of infection, scarring, bleeding, prolonged wound healing, incomplete removal, allergy to anesthesia, nerve injury and recurrence were addressed.  Indication for Mohs was Location. Prior to the procedure, the treatment site was clearly identified and, if available, confirmed with previous photos and confirmed by the patient   All components of the Universal Protocol/PAUSE rule were completed.  The Mohs surgeon operated in two distinct and integrated capacities as the surgeon and pathologist.      The area was prepped with Betasept.  A rim of normal appearing skin was marked circumferentially around the lesion.  The area was infiltrated with local anesthesia.  The tumor was first debulked to remove all clinically apparent tumor.  An incision following the standard Mohs approach was done and the specimen was oriented,mapped and placed in 1 block(s).  Each specimen was then chromacoded and processed in the Mohs laboratory using standard Mohs technique and submitted for frozen section histology.  Frozen section analysis showed no residual tumor but CLEAR MARGINS.      The tumor was excised using standard Mohs technique in 1 stages(s).  CLEAR MARGINS OBTAINED and Final  defect size was 1.5 cm.     We discussed the options for wound management in full with the patient including risks/benefits/ possible outcomes.        REPAIR SECOND INTENT: We discussed the options for wound management in full with the patient including risks/benefits/possible outcomes. Decision made to allow the wound to heal by second intention. Cautery was used for for hemostasis. EBL minimal; complications none; wound care routine.  The patient was discharged in good condition and will return in one month or prn for wound evaluation.  It was a pleasure speaking to Ina Otoole today.  Previous clinic notes and pertinent laboratory tests were reviewed prior to Ina Otoole's visit.  Signs and Symptoms of skin cancer discussed with patient.  Patient encouraged to perform monthly skin exams.  UV precautions reviewed with patient.  Risks of non-melanoma skin cancer discussed with patient   Return to clinic 6 months

## 2022-10-23 ENCOUNTER — HEALTH MAINTENANCE LETTER (OUTPATIENT)
Age: 67
End: 2022-10-23

## 2022-11-06 ENCOUNTER — APPOINTMENT (OUTPATIENT)
Dept: ULTRASOUND IMAGING | Facility: CLINIC | Age: 67
End: 2022-11-06
Attending: EMERGENCY MEDICINE
Payer: MEDICARE

## 2022-11-06 ENCOUNTER — HOSPITAL ENCOUNTER (EMERGENCY)
Facility: CLINIC | Age: 67
Discharge: HOME | End: 2022-11-06
Attending: EMERGENCY MEDICINE | Admitting: EMERGENCY MEDICINE
Payer: MEDICARE

## 2022-11-06 ENCOUNTER — NURSE TRIAGE (OUTPATIENT)
Dept: NURSING | Facility: CLINIC | Age: 67
End: 2022-11-06

## 2022-11-06 VITALS
RESPIRATION RATE: 14 BRPM | TEMPERATURE: 98.9 F | WEIGHT: 186 LBS | HEIGHT: 70 IN | BODY MASS INDEX: 26.63 KG/M2 | HEART RATE: 79 BPM | DIASTOLIC BLOOD PRESSURE: 68 MMHG | SYSTOLIC BLOOD PRESSURE: 128 MMHG | OXYGEN SATURATION: 98 %

## 2022-11-06 DIAGNOSIS — M25.572 PAIN IN JOINT INVOLVING ANKLE AND FOOT, LEFT: ICD-10-CM

## 2022-11-06 PROCEDURE — 99284 EMERGENCY DEPT VISIT MOD MDM: CPT | Mod: 25 | Performed by: EMERGENCY MEDICINE

## 2022-11-06 PROCEDURE — 99282 EMERGENCY DEPT VISIT SF MDM: CPT | Performed by: EMERGENCY MEDICINE

## 2022-11-06 PROCEDURE — 93971 EXTREMITY STUDY: CPT | Mod: LT

## 2022-11-06 RX ORDER — CEPHALEXIN 500 MG/1
500 CAPSULE ORAL 4 TIMES DAILY
Qty: 28 CAPSULE | Refills: 0 | Status: SHIPPED | OUTPATIENT
Start: 2022-11-06 | End: 2022-11-13

## 2022-11-06 ASSESSMENT — ENCOUNTER SYMPTOMS
COUGH: 0
AGITATION: 0
WEAKNESS: 0
COLOR CHANGE: 0
EYE DISCHARGE: 0
CHILLS: 0
MYALGIAS: 0
FEVER: 0
SORE THROAT: 0
ARTHRALGIAS: 1
SHORTNESS OF BREATH: 0
FREQUENCY: 0
ABDOMINAL PAIN: 0
ADENOPATHY: 0
VOMITING: 0
NAUSEA: 0
HEADACHES: 0
CONFUSION: 0
LIGHT-HEADEDNESS: 0
DIARRHEA: 0
DYSURIA: 0

## 2022-11-06 ASSESSMENT — ACTIVITIES OF DAILY LIVING (ADL): ADLS_ACUITY_SCORE: 35

## 2022-11-06 NOTE — DISCHARGE INSTRUCTIONS
Warm packs  Ace wrap  Ibuprofen 600 mg 3 times a day for 3 days  Take the antibiotic as prescribed

## 2022-11-06 NOTE — TELEPHONE ENCOUNTER
Pain and swelling in one ankle since Tuesday. Pt states that it is painful to the touch and with walking. No injury or trauma noted. No chest pain or SOB. Warm to the touch. Redness and swelling around entire ankle. Pt has noted some calf pain but states it is not constant. No swelling in calf or leg noted. No thigh pain.    Advised pt to present to  and she will go.    Marleny Jimenes, RN, BSN  [facility] Nurse Advisor 12:02 PM 11/6/2022      Reason for Disposition    [1] Redness AND [2] painful when touched AND [3] no fever    Additional Information    Negative: Followed an injury    Negative: Thigh or calf pain is main symptom    Negative: Thigh or calf swelling is main symptom    Negative: Foot pain is main symptom    Negative: Entire foot is cool or blue in comparison to other side    Negative: Fever and red area (or area very tender to touch)    Negative: Swollen joint and fever    Negative: Thigh or calf pain and only 1 side and present > 1 hour    Negative: Thigh or calf swelling and only 1 side    Negative: Patient sounds very sick or weak to the triager    Negative: Chest pain    Negative: Followed an ankle injury    Negative: Ankle pain is main symptom    Negative: Swelling of both ankles (i.e., pedal edema)    Negative: Swelling of calf or leg    Negative: Small area of LOCALIZED swelling followed an insect bite to the area    Negative: Difficulty breathing    Negative: Entire foot is cool or blue in comparison to other side    Negative: Fever    Negative: Patient sounds very sick or weak to the triager    Negative: [1] SEVERE pain (e.g., excruciating, unable to walk) AND [2] not improved after 2 hours of pain medicine    Negative: [1] Can't move swollen ankle at all AND [2] no fever    Protocols used: ANKLE SWELLING-A-AH, ANKLE PAIN-A-OH

## 2022-11-06 NOTE — ED TRIAGE NOTES
"\" I think I have a blood clot on my ankle\" Noted on Tuesday. Left ankle. Hx of blood clots, takes baby ASA daily.      Triage Assessment     Row Name 11/06/22 1241       Triage Assessment (Adult)    Airway WDL WDL       Respiratory WDL    Respiratory WDL WDL       Skin Circulation/Temperature WDL    Skin Circulation/Temperature WDL WDL       Cardiac WDL    Cardiac WDL WDL       Peripheral/Neurovascular WDL    Peripheral Neurovascular WDL WDL       Cognitive/Neuro/Behavioral WDL    Cognitive/Neuro/Behavioral WDL WDL              "

## 2022-11-07 ENCOUNTER — PATIENT OUTREACH (OUTPATIENT)
Dept: FAMILY MEDICINE | Facility: CLINIC | Age: 67
End: 2022-11-07

## 2022-11-15 ENCOUNTER — OFFICE VISIT (OUTPATIENT)
Dept: FAMILY MEDICINE | Facility: CLINIC | Age: 67
End: 2022-11-15
Payer: MEDICARE

## 2022-11-15 ENCOUNTER — HOSPITAL ENCOUNTER (OUTPATIENT)
Dept: ULTRASOUND IMAGING | Facility: CLINIC | Age: 67
Discharge: HOME | End: 2022-11-15
Attending: FAMILY MEDICINE | Admitting: FAMILY MEDICINE
Payer: MEDICARE

## 2022-11-15 VITALS
TEMPERATURE: 96.8 F | SYSTOLIC BLOOD PRESSURE: 134 MMHG | OXYGEN SATURATION: 98 % | WEIGHT: 188.6 LBS | RESPIRATION RATE: 16 BRPM | HEART RATE: 70 BPM | BODY MASS INDEX: 27 KG/M2 | DIASTOLIC BLOOD PRESSURE: 96 MMHG | HEIGHT: 70 IN

## 2022-11-15 DIAGNOSIS — M79.662 PAIN AND SWELLING OF LEFT LOWER LEG: Primary | ICD-10-CM

## 2022-11-15 DIAGNOSIS — Z78.0 ASYMPTOMATIC MENOPAUSAL STATE: ICD-10-CM

## 2022-11-15 DIAGNOSIS — I80.02 THROMBOPHLEBITIS OF LEFT SAPHENOUS VEIN: ICD-10-CM

## 2022-11-15 DIAGNOSIS — M79.89 PAIN AND SWELLING OF LEFT LOWER LEG: Primary | ICD-10-CM

## 2022-11-15 DIAGNOSIS — Z12.11 SCREEN FOR COLON CANCER: ICD-10-CM

## 2022-11-15 DIAGNOSIS — M79.89 PAIN AND SWELLING OF LEFT LOWER LEG: ICD-10-CM

## 2022-11-15 DIAGNOSIS — M79.662 PAIN AND SWELLING OF LEFT LOWER LEG: ICD-10-CM

## 2022-11-15 DIAGNOSIS — E55.9 VITAMIN D DEFICIENCY: ICD-10-CM

## 2022-11-15 DIAGNOSIS — Z11.59 NEED FOR HEPATITIS C SCREENING TEST: ICD-10-CM

## 2022-11-15 PROBLEM — I80.299: Status: RESOLVED | Noted: 2020-01-29 | Resolved: 2022-11-15

## 2022-11-15 PROCEDURE — 36415 COLL VENOUS BLD VENIPUNCTURE: CPT | Performed by: FAMILY MEDICINE

## 2022-11-15 PROCEDURE — 82306 VITAMIN D 25 HYDROXY: CPT | Performed by: FAMILY MEDICINE

## 2022-11-15 PROCEDURE — 99214 OFFICE O/P EST MOD 30 MIN: CPT | Performed by: FAMILY MEDICINE

## 2022-11-15 PROCEDURE — 86803 HEPATITIS C AB TEST: CPT | Performed by: FAMILY MEDICINE

## 2022-11-15 PROCEDURE — 93971 EXTREMITY STUDY: CPT | Mod: LT

## 2022-11-15 RX ORDER — ASPIRIN 325 MG
325 TABLET ORAL DAILY
Qty: 90 TABLET | Refills: 0 | Status: SHIPPED | OUTPATIENT
Start: 2022-11-15

## 2022-11-15 ASSESSMENT — PAIN SCALES - GENERAL: PAINLEVEL: MODERATE PAIN (4)

## 2022-11-15 NOTE — PATIENT INSTRUCTIONS
Start aspirin 325 mg daily.  Stop aspirin 81 mg.    Referrals to vascular surgery clinic has been signed. Schedulers will call you in the next 3-5 business days.     If with worsening swelling, pain, or if you develop chest pain or shortness of breath in next several days, seek care in the ER.    You will be contacted in 1-2 days for results of your lab tests.     Read more information about your conditions in the handouts attached to this visit summary.

## 2022-11-15 NOTE — PROGRESS NOTES
Assessment & Plan     Pain and swelling of left lower leg  Reviewed with patient her ER visit notes and evaluation. No DVT nor STP then.  However, on exam today, patient still has almost linear tenderness  along medial aspect of lower leg with no associated induration or mass.  Still suspect superficial thrombophlebitis - she has had two episodes already before, last one in 2017.  Patient agreed to repeat US today.  - US Lower Extremity Venous Duplex Left  - aspirin (ASA) 325 MG tablet  Dispense: 90 tablet; Refill: 0  - Vascular Surgery Referral    Thrombophlebitis of left saphenous vein  Patient came back from US. Radiologist reported saphenous vein thrombus along area of pain.  Reviewed with patient her previous eval and tx for this.   Considering she has repeated episodes and has had even procedure for this, will refer to [facility] surgery vein clinic again.  Increase aspirin to 325 mg daily. Not using warfarin or DOAC based on location of the thrombus.  Reasons to go to ER discussed in detail with patient.  - aspirin (ASA) 325 MG tablet  Dispense: 90 tablet; Refill: 0  - Vascular Surgery Referral    Asymptomatic menopausal state  - DEXA HIP/PELVIS/SPINE - Future    Vitamin D deficiency  On supplement currently.  Consider treatment dose if deficient level.  - DEXA HIP/PELVIS/SPINE - Future  - Vitamin D Deficiency  - Vitamin D Deficiency    Need for hepatitis C screening test  Offered screening based on current recommendations. Patient concurred to screen.  - Hepatitis C Screen Reflex to HCV RNA Quant and Genotype  - Hepatitis C Screen Reflex to HCV RNA Quant and Genotype    Screen for colon cancer  Reviewed with patient her last colnoscopy. rec then was to repeat in 10 years.     MED REC REQUIRED  Post Medication Reconciliation Status: discharge medications reconciled, continue medications without change    Patient Instructions   Start aspirin 325 mg daily.  Stop aspirin 81 mg.    Referrals to vascular surgery  "clinic has been signed. Schedulers will call you in the next 3-5 business days.     If with worsening swelling, pain, or if you develop chest pain or shortness of breath in next several days, seek care in the ER.    You will be contacted in 1-2 days for results of your lab tests.     Read more information about your conditions in the handouts attached to this visit summary.       Return in about 1 week (around 11/22/2022) for In-clinic visit for if with worsening leg swelling/pain.    Marcelino Pugh MD  [facility] MONICA Cotto is a 67 year old, presenting for the following health issues:  ER F/U (Pt being seen for post e/r follow up.) and Blood Draw (Pt would also like her vitamin D level checked.  Has had problems in the past.)      HPI     ED/UC Followup:    Facility:  [facility]  Date of visit: 11/6/22  Reason for visit: swollen and painful left ankle  Current Status: Swelling is better but still hurting, throbs at times.  Ranges from a 3-6.  No injury.  U/S was negative for blood clot.    Patient denies chest pain, dyspnea, or palpitations.    Patient denies trauma to the involved extremity.    She denies prolonged immobility.    She has up to date cancer screenings and has been found to be negative for any malignancy.    Already on Aspirin 81 mg daily.    Review of Systems   Constitutional, HEENT, cardiovascular, pulmonary, GI, , musculoskeletal, neuro, skin, endocrine and psych systems are negative, except as otherwise noted.      Objective    BP (!) 134/96   Pulse 70   Temp 96.8  F (36  C) (Tympanic)   Resp 16   Ht 1.765 m (5' 9.5\")   Wt 85.5 kg (188 lb 9.6 oz)   SpO2 98%   BMI 27.45 kg/m    Body mass index is 27.45 kg/m .  Physical Exam   GENERAL: alert and no distress, ambulatory w/o assist  NECK: no tenderness, no adenopathy,  Thyroid not enlarged  RESP: lungs clear to auscultation - no rales, no rhonchi, no wheezes  CV: regular rates and rhythm, no " murmur  MS: no edema either leg/foot; left lower extremity with linear tenderness along distal medial area of the lower leg to medial malleolus area with no palpable mass or fluctuance; scattered small varicose veins of the lower legs and ankles with mildly tender ones on left pretibial area.  SKIN: no suspicious lesions, no rashes; no induration over the area of leg pain mentioned      Results for orders placed or performed during the hospital encounter of 11/15/22   US Lower Extremity Venous Duplex Left     Status: None    Narrative    VENOUS ULTRASOUND LEFT LEG  11/15/2022 11:22 AM     HISTORY: Pain and swelling of left lower leg; Pain and swelling of  left lower leg    COMPARISON: None.    FINDINGS:  Examination of the deep veins with graded compression and  color flow Doppler with spectral wave form analysis was performed.   There is no evidence for DVT in the left lower extremity. There is  greater than 5 cm superficial venous thrombus in the left greater  saphenous vein extending from the mid calf to the ankle. This lies in  the area of patient's pain.      Impression    IMPRESSION: No evidence of deep venous thrombosis.  Superficial venous  thrombus in the left greater saphenous vein as above.    SYED OLIVEIRA MD         SYSTEM ID:  [id number]

## 2022-11-16 LAB — HCV AB SERPL QL IA: NONREACTIVE

## 2022-11-17 LAB — DEPRECATED CALCIDIOL+CALCIFEROL SERPL-MC: 28 UG/L (ref 20–75)

## 2023-01-12 ENCOUNTER — HOSPITAL ENCOUNTER (OUTPATIENT)
Dept: BONE DENSITY | Facility: CLINIC | Age: 68
Discharge: HOME | End: 2023-01-12
Attending: FAMILY MEDICINE | Admitting: FAMILY MEDICINE
Payer: MEDICARE

## 2023-01-12 DIAGNOSIS — E55.9 VITAMIN D DEFICIENCY: ICD-10-CM

## 2023-01-12 DIAGNOSIS — Z78.0 ASYMPTOMATIC MENOPAUSAL STATE: ICD-10-CM

## 2023-01-12 PROCEDURE — 77080 DXA BONE DENSITY AXIAL: CPT

## 2023-01-18 ENCOUNTER — VIRTUAL VISIT (OUTPATIENT)
Dept: FAMILY MEDICINE | Facility: CLINIC | Age: 68
End: 2023-01-18
Payer: MEDICARE

## 2023-01-18 DIAGNOSIS — Z13.220 LIPID SCREENING: Primary | ICD-10-CM

## 2023-01-18 DIAGNOSIS — Z86.718 HISTORY OF DVT (DEEP VEIN THROMBOSIS): ICD-10-CM

## 2023-01-18 PROCEDURE — 99441 PR PHYSICIAN TELEPHONE EVALUATION 5-10 MIN: CPT | Mod: 95 | Performed by: FAMILY MEDICINE

## 2023-01-18 ASSESSMENT — ENCOUNTER SYMPTOMS
RESPIRATORY NEGATIVE: 1
ENDOCRINE NEGATIVE: 1
CONSTITUTIONAL NEGATIVE: 1
CARDIOVASCULAR NEGATIVE: 1
PSYCHIATRIC NEGATIVE: 1
ARTHRALGIAS: 1
GASTROINTESTINAL NEGATIVE: 1
NEUROLOGICAL NEGATIVE: 1
HEMATOLOGIC/LYMPHATIC NEGATIVE: 1
ALLERGIC/IMMUNOLOGIC NEGATIVE: 1

## 2023-01-18 NOTE — PROGRESS NOTES
"Kirti is a 67 year old who is being evaluated via a billable telephone visit.      What phone number would you like to be contacted at? [phone]  How would you like to obtain your AVS? Cesia  Distant Location (provider location):  On-site  Patient is a 67-year-old who comes in for concerns of DVTs.  She was seen in November in the emergency room for concerns of DVT. At that visit her US was negative for DVT     She then followed up in clinic and had another ultrasound that showed a superficial thrombophlebitis in her lower extremity.  She reports a strong family history of DVT and possible PAD reports her sister was just diagnosed with \"hardening of the arteries\"causing blood obstruction, she is worried that she may have hardening of the arteries.      The patient was reassured had cholesterol screening a few years ago that was normal.  She also mentioned that she has been having some pain in her leg in random places.  Pain is usually at rest there is no association with trauma.  She is seeing a vein specialist for superficial thrombophlebitis and she was it was recommended that she put on her compression stockings.  Patient has been compliant with that.      Lastly she wanted to discuss her bone density test that was done a few days ago.  The bone density scan showed osteopenia.  I explained what this means and I asked the patient to supplement with vitamin D and calcium.  At this junction she does not need treatment for the the osteopenia.      Giving the description of her leg pain I recommended that the patient be seen in person she will be seen 20 January at 8:40 check-in time.  Assessment & Plan     Lipid screening  Patient will be notified of results.   - Lipid panel reflex to direct LDL Fasting; Future    History of DVT (deep vein thrombosis)  - Factor 5 leiden mutation analysis; Future    956}     BMI:   Estimated body mass index is 27.45 kg/m  as calculated from the following:    Height as of " "11/15/22: 1.765 m (5' 9.5\").    Weight as of 11/15/22: 85.5 kg (188 lb 9.6 oz).       FUTURE APPOINTMENTS:       - Follow-up visit in 2 days.    Return in about 2 days (around 1/20/2023) for Follow up.    Verito Ashby MD  [facility] MONICA Cotto is a 67 year old accompanied by her self, presenting for the following health issues:  Dexa scan follow up (Follow up on her Dexa scan results from 1-.), Deep Vein Thrombosis (Discuss about the blood clot in the left lower leg and family history.  Paternal grandmother had strokes, hardening of the arteries, clots.  She lost a leg, they are not sure if due to clots or diabetes.  Passed away at the age of 56.  Sister has clots now in the legs as well.  Also has hardening of the arteries and needs to have surgery.), and New medication to add (Patient takes a probiotic daily.)    Chief Complaint   Patient presents with     Dexa scan follow up     Follow up on her Dexa scan results from 1-.     Deep Vein Thrombosis     Discuss about the blood clot in the left lower leg and family history.  Paternal grandmother had strokes, hardening of the arteries, clots.  She lost a leg, they are not sure if due to clots or diabetes.  Passed away at the age of 56.  Sister has clots now in the legs as well.  Also has hardening of the arteries and needs to have surgery.     New medication to add     Patient takes a probiotic daily.       History of Present Illness       Reason for visit:  Followup bone density test & blood clot advice    She eats 2-3 servings of fruits and vegetables daily.She consumes 0 sweetened beverage(s) daily.She exercises with enough effort to increase her heart rate 20 to 29 minutes per day.  She exercises with enough effort to increase her heart rate 4 days per week.   She is taking medications regularly.             Review of Systems   Constitutional: Negative.    HENT: Negative.    Respiratory: Negative.  "   Cardiovascular: Negative.    Gastrointestinal: Negative.    Endocrine: Negative.    Breasts:  negative.    Genitourinary: Negative.    Musculoskeletal: Positive for arthralgias.   Skin: Negative.    Allergic/Immunologic: Negative.    Neurological: Negative.    Hematological: Negative.    Psychiatric/Behavioral: Negative.             Objective    Vitals - Patient Reported  Pain Score: Mild Pain (3)  Pain Loc: Other - see comment (Fibromyalgia-all over.)      Vitals:  No vitals were obtained today due to virtual visit.    Physical Exam   healthy, alert and no distress  PSYCH: Alert and oriented times 3; coherent speech, normal   rate and volume, able to articulate logical thoughts, able   to abstract reason, no tangential thoughts, no hallucinations   or delusions  Her affect is normal  RESP: No cough, no audible wheezing, able to talk in full sentences  Remainder of exam unable to be completed due to telephone visits            Phone call duration: Telephone encounter time 10 minutes

## 2023-01-20 ENCOUNTER — OFFICE VISIT (OUTPATIENT)
Dept: FAMILY MEDICINE | Facility: CLINIC | Age: 68
End: 2023-01-20
Payer: MEDICARE

## 2023-01-20 ENCOUNTER — OFFICE VISIT (OUTPATIENT)
Dept: DERMATOLOGY | Facility: CLINIC | Age: 68
End: 2023-01-20
Payer: MEDICARE

## 2023-01-20 VITALS
HEIGHT: 70 IN | BODY MASS INDEX: 26.92 KG/M2 | RESPIRATION RATE: 16 BRPM | OXYGEN SATURATION: 98 % | HEART RATE: 82 BPM | DIASTOLIC BLOOD PRESSURE: 78 MMHG | TEMPERATURE: 96.9 F | WEIGHT: 188 LBS | SYSTOLIC BLOOD PRESSURE: 104 MMHG

## 2023-01-20 DIAGNOSIS — Z85.828 HISTORY OF BASAL CELL CANCER: ICD-10-CM

## 2023-01-20 DIAGNOSIS — Z86.718 HISTORY OF DVT (DEEP VEIN THROMBOSIS): ICD-10-CM

## 2023-01-20 DIAGNOSIS — Z13.220 LIPID SCREENING: ICD-10-CM

## 2023-01-20 DIAGNOSIS — I80.3 THROMBOPHLEBITIS LEG: Primary | ICD-10-CM

## 2023-01-20 DIAGNOSIS — L90.0 LICHEN SCLEROSUS: Primary | ICD-10-CM

## 2023-01-20 LAB
ANION GAP SERPL CALCULATED.3IONS-SCNC: 9 MMOL/L (ref 7–15)
BUN SERPL-MCNC: 13.7 MG/DL (ref 8–23)
CALCIUM SERPL-MCNC: 9.8 MG/DL (ref 8.8–10.2)
CHLORIDE SERPL-SCNC: 107 MMOL/L (ref 98–107)
CHOLEST SERPL-MCNC: 183 MG/DL
CREAT SERPL-MCNC: 0.83 MG/DL (ref 0.51–0.95)
DEPRECATED HCO3 PLAS-SCNC: 28 MMOL/L (ref 22–29)
FACTOR V INTERPRETATION: NORMAL
GFR SERPL CREATININE-BSD FRML MDRD: 76 ML/MIN/1.73M2
GLUCOSE SERPL-MCNC: 99 MG/DL (ref 70–99)
HDLC SERPL-MCNC: 80 MG/DL
LAB DIRECTOR COMMENTS: NORMAL
LAB DIRECTOR DISCLAIMER: NORMAL
LAB DIRECTOR INTERPRETATION: NORMAL
LAB DIRECTOR METHODOLOGY: NORMAL
LAB DIRECTOR RESULTS: NORMAL
LDLC SERPL CALC-MCNC: 84 MG/DL
NONHDLC SERPL-MCNC: 103 MG/DL
POTASSIUM SERPL-SCNC: 4.4 MMOL/L (ref 3.4–5.3)
SODIUM SERPL-SCNC: 144 MMOL/L (ref 136–145)
SPECIMEN DESCRIPTION: NORMAL
TRIGL SERPL-MCNC: 95 MG/DL

## 2023-01-20 PROCEDURE — 99213 OFFICE O/P EST LOW 20 MIN: CPT | Performed by: PHYSICIAN ASSISTANT

## 2023-01-20 PROCEDURE — 99214 OFFICE O/P EST MOD 30 MIN: CPT | Performed by: FAMILY MEDICINE

## 2023-01-20 PROCEDURE — 36415 COLL VENOUS BLD VENIPUNCTURE: CPT | Performed by: FAMILY MEDICINE

## 2023-01-20 PROCEDURE — 80061 LIPID PANEL: CPT | Performed by: FAMILY MEDICINE

## 2023-01-20 PROCEDURE — 81241 F5 GENE: CPT | Performed by: FAMILY MEDICINE

## 2023-01-20 PROCEDURE — G0452 MOLECULAR PATHOLOGY INTERPR: HCPCS | Performed by: PATHOLOGY

## 2023-01-20 PROCEDURE — 80048 BASIC METABOLIC PNL TOTAL CA: CPT | Performed by: FAMILY MEDICINE

## 2023-01-20 RX ORDER — CLOBETASOL PROPIONATE 0.5 MG/G
OINTMENT TOPICAL 2 TIMES DAILY
Qty: 30 G | Refills: 5 | Status: SHIPPED | OUTPATIENT
Start: 2023-01-20

## 2023-01-20 ASSESSMENT — ENCOUNTER SYMPTOMS
GASTROINTESTINAL NEGATIVE: 1
NEUROLOGICAL NEGATIVE: 1
ENDOCRINE NEGATIVE: 1
CARDIOVASCULAR NEGATIVE: 1
EYES NEGATIVE: 1
ARTHRALGIAS: 1
HEMATOLOGIC/LYMPHATIC NEGATIVE: 1
ALLERGIC/IMMUNOLOGIC NEGATIVE: 1
CONSTITUTIONAL NEGATIVE: 1
RESPIRATORY NEGATIVE: 1
PSYCHIATRIC NEGATIVE: 1

## 2023-01-20 ASSESSMENT — PAIN SCALES - GENERAL
PAINLEVEL: NO PAIN (0)
PAINLEVEL: NO PAIN (0)

## 2023-01-20 NOTE — LETTER
1/20/2023         RE: Ina Otoole  [address] [facility] [address]        Dear Colleague,    Thank you for referring your patient, Ina Otoole, to the [facility]. Please see a copy of my visit note below.    Ina Otoole is an extremely pleasant 67 year old year old female patient here today for recheck lichen sclerosus, she notes skin is clear. Using clobetasol 3-4 times weekly. She notes surgical site for BCC healed well, just discolored. No new issues.  Patient has no other skin complaints today.  Remainder of the HPI, Meds, PMH, Allergies, FH, and SH was reviewed in chart.    Pertinent Hx:   History of BCC  Past Medical History:   Diagnosis Date     Arthritis 2018     Basal cell carcinoma      Calculus of gallbladder with other cholecystitis, without mention of obstruction 06/07/2011     Goiter        Past Surgical History:   Procedure Laterality Date     ABDOMEN SURGERY       ARTHROSCOPY KNEE      left meniscus repair     ARTHROSCOPY KNEE RT/LT      left - meniscus repair     COLONOSCOPY N/A 08/24/2017    Procedure: COLONOSCOPY;  Colonoscopy  ;  Surgeon: Abhishek Escobedo MD;  Location: [facility]     COLONOSCOPY       KNEE SURGERY  09/2013    left knee replacement     LAPAROSCOPIC CHOLECYSTECTOMY  06/29/2011    Procedure:LAPAROSCOPIC CHOLECYSTECTOMY; Surgeon:SUMAYA ZAMORANO; Location:[facility]     LAPAROSCOPIC CHOLECYSTECTOMY       REPLACEMENT TOTAL KNEE Right 08/20/2018    Procedure: RIGHT TOTAL KNEE ARTHROPLASTY COMPUTER ASSIST;  Surgeon: Kevin Haji MD;  Location: [facility] OR;  Service:      STONE ANALYSIS  2011     THYROID BIOPSY       TOTAL KNEE ARTHROPLASTY Left      TUBAL LIGATION  1980     VASCULAR SURGERY  2018        Family History   Problem Relation Age of Onset     Diabetes Mother      Hypertension Mother      Cerebrovascular Disease Mother      Cancer Mother         melanoma     Dementia Mother      Basal cell carcinoma Mother       Squamous cell carcinoma Mother      Respiratory Father         copd     Cancer - colorectal Father      Bronchitis Father      Hypertension Sister      Multiple Sclerosis Sister      Neurologic Disorder Sister         ms     Diabetes Brother      Hypertension Brother      Breast Cancer Maternal Grandmother      Diabetes Paternal Grandmother      Other - See Comments Daughter         Transverse Myelitis     Asthma No family hx of      Prostate Cancer No family hx of        Social History     Socioeconomic History     Marital status:      Spouse name: Not on file     Number of children: Not on file     Years of education: Not on file     Highest education level: Not on file   Occupational History     Not on file   Tobacco Use     Smoking status: Former     Packs/day: 0.00     Years: 0.00     Pack years: 0.00     Types: Cigarettes     Quit date: 2000     Years since quittin.9     Smokeless tobacco: Never   Vaping Use     Vaping Use: Never used   Substance and Sexual Activity     Alcohol use: Yes     Comment: only for special occassions     Drug use: No     Sexual activity: Never   Other Topics Concern     Parent/sibling w/ CABG, MI or angioplasty before 65F 55M? No   Social History Narrative    Working at [employer]  [employer]    .      2 children    2 grandchild     Social Determinants of Health     Financial Resource Strain: Not on file   Food Insecurity: Not on file   Transportation Needs: Not on file   Physical Activity: Not on file   Stress: Not on file   Social Connections: Not on file   Intimate Partner Violence: Not on file   Housing Stability: Not on file       Outpatient Encounter Medications as of 2023   Medication Sig Dispense Refill     aspirin (ASA) 325 MG tablet Take 1 tablet (325 mg) by mouth daily 90 tablet 0     Cholecalciferol (VITAMIN D3 PO) Take 25 mcg by mouth daily       clobetasol (TEMOVATE) 0.05 % external ointment Apply topically 2 times daily 30 g 5      famotidine (PEPCID) 20 MG tablet Take 1 tablet (20 mg) by mouth 2 times daily 30 tablet 6     Ibuprofen (ADVIL PO) Take 400 mg by mouth 2 times daily       multivitamin (ONE-DAILY) tablet Take 1 tablet by mouth daily Also has Vitamin D 25 mcg in the vitamin.       senna-docusate (SENOKOT-S;PERICOLACE) 8.6-50 MG per tablet Take 2 tablets by mouth 2 times daily        [DISCONTINUED] clobetasol (TEMOVATE) 0.05 % external ointment Apply topically 2 times daily 30 g 1     No facility-administered encounter medications on file as of 1/20/2023.             O:   NAD, WDWN, Alert & Oriented, Mood & Affect wnl, Vitals stable   Here today alone   There were no vitals taken for this visit.   General appearance normal   Vitals stable   Alert, oriented and in no acute distress     Hyperpigmented scar on right shin  Deferred evaluation of groin since clear per patient      Eyes: Conjunctivae/lids:Normal     ENT: Lips: normal    MSK:Normal    Pulm: Breathing Normal    Neuro/Psych: Orientation:Alert and Orientedx3 ; Mood/Affect:normal   A/P:  1. Lichen sclerosus   Controlled.   Since clear decrease clobetasol ointment to 1-2 times weekly.  Continue vaseline as barrier ointment.   2. History of BCC  Hyperpigmented scar should slowly improve with color, but it will mostly always be a little darker scar.   Return in summer for skin check.     It was a pleasure speaking to Ina Otoole today.      Again, thank you for allowing me to participate in the care of your patient.        Sincerely,        Tiffany Reid PA-C

## 2023-01-20 NOTE — PROGRESS NOTES
Ina Otoole is an extremely pleasant 67 year old year old female patient here today for recheck lichen sclerosus, she notes skin is clear. Using clobetasol 3-4 times weekly. She notes surgical site for BCC healed well, just discolored. No new issues.  Patient has no other skin complaints today.  Remainder of the HPI, Meds, PMH, Allergies, FH, and SH was reviewed in chart.    Pertinent Hx:   History of BCC  Past Medical History:   Diagnosis Date     Arthritis 2018     Basal cell carcinoma      Calculus of gallbladder with other cholecystitis, without mention of obstruction 06/07/2011     Goiter        Past Surgical History:   Procedure Laterality Date     ABDOMEN SURGERY       ARTHROSCOPY KNEE      left meniscus repair     ARTHROSCOPY KNEE RT/LT      left - meniscus repair     COLONOSCOPY N/A 08/24/2017    Procedure: COLONOSCOPY;  Colonoscopy  ;  Surgeon: Abhishek Escobedo MD;  Location: [facility]     COLONOSCOPY       KNEE SURGERY  09/2013    left knee replacement     LAPAROSCOPIC CHOLECYSTECTOMY  06/29/2011    Procedure:LAPAROSCOPIC CHOLECYSTECTOMY; Surgeon:SUMAYA ZAMORANO; Location:[facility]     LAPAROSCOPIC CHOLECYSTECTOMY       REPLACEMENT TOTAL KNEE Right 08/20/2018    Procedure: RIGHT TOTAL KNEE ARTHROPLASTY COMPUTER ASSIST;  Surgeon: Kevin Haji MD;  Location: [facility] OR;  Service:      STONE ANALYSIS  2011     THYROID BIOPSY       TOTAL KNEE ARTHROPLASTY Left      TUBAL LIGATION  1980     VASCULAR SURGERY  2018        Family History   Problem Relation Age of Onset     Diabetes Mother      Hypertension Mother      Cerebrovascular Disease Mother      Cancer Mother         melanoma     Dementia Mother      Basal cell carcinoma Mother      Squamous cell carcinoma Mother      Respiratory Father         copd     Cancer - colorectal Father      Bronchitis Father      Hypertension Sister      Multiple Sclerosis Sister      Neurologic Disorder Sister         ms     Diabetes Brother       Hypertension Brother      Breast Cancer Maternal Grandmother      Diabetes Paternal Grandmother      Other - See Comments Daughter         Transverse Myelitis     Asthma No family hx of      Prostate Cancer No family hx of        Social History     Socioeconomic History     Marital status:      Spouse name: Not on file     Number of children: Not on file     Years of education: Not on file     Highest education level: Not on file   Occupational History     Not on file   Tobacco Use     Smoking status: Former     Packs/day: 0.00     Years: 0.00     Pack years: 0.00     Types: Cigarettes     Quit date: 2000     Years since quittin.9     Smokeless tobacco: Never   Vaping Use     Vaping Use: Never used   Substance and Sexual Activity     Alcohol use: Yes     Comment: only for special occassions     Drug use: No     Sexual activity: Never   Other Topics Concern     Parent/sibling w/ CABG, MI or angioplasty before 65F 55M? No   Social History Narrative    Working at [employer]  [employer]    .      2 children    2 grandchild     Social Determinants of Health     Financial Resource Strain: Not on file   Food Insecurity: Not on file   Transportation Needs: Not on file   Physical Activity: Not on file   Stress: Not on file   Social Connections: Not on file   Intimate Partner Violence: Not on file   Housing Stability: Not on file       Outpatient Encounter Medications as of 2023   Medication Sig Dispense Refill     aspirin (ASA) 325 MG tablet Take 1 tablet (325 mg) by mouth daily 90 tablet 0     Cholecalciferol (VITAMIN D3 PO) Take 25 mcg by mouth daily       clobetasol (TEMOVATE) 0.05 % external ointment Apply topically 2 times daily 30 g 5     famotidine (PEPCID) 20 MG tablet Take 1 tablet (20 mg) by mouth 2 times daily 30 tablet 6     Ibuprofen (ADVIL PO) Take 400 mg by mouth 2 times daily       multivitamin (ONE-DAILY) tablet Take 1 tablet by mouth daily Also has Vitamin D 25 mcg  in the vitamin.       senna-docusate (SENOKOT-S;PERICOLACE) 8.6-50 MG per tablet Take 2 tablets by mouth 2 times daily        [DISCONTINUED] clobetasol (TEMOVATE) 0.05 % external ointment Apply topically 2 times daily 30 g 1     No facility-administered encounter medications on file as of 1/20/2023.             O:   NAD, WDWN, Alert & Oriented, Mood & Affect wnl, Vitals stable   Here today alone   There were no vitals taken for this visit.   General appearance normal   Vitals stable   Alert, oriented and in no acute distress     Hyperpigmented scar on right shin  Deferred evaluation of groin since clear per patient      Eyes: Conjunctivae/lids:Normal     ENT: Lips: normal    MSK:Normal    Pulm: Breathing Normal    Neuro/Psych: Orientation:Alert and Orientedx3 ; Mood/Affect:normal   A/P:  1. Lichen sclerosus   Controlled.   Since clear decrease clobetasol ointment to 1-2 times weekly.  Continue vaseline as barrier ointment.   2. History of BCC  Hyperpigmented scar should slowly improve with color, but it will mostly always be a little darker scar.   Return in summer for skin check.     It was a pleasure speaking to Ina Otoole today.

## 2023-01-20 NOTE — PROGRESS NOTES
"Patient is a 67-year-old here for follow-up on thrombophlebitis of the lower extremity.  She was seen by one of my colleagues a couple of weeks ago and had an ultrasound that showed a superficial phlebitis of the leg the left leg.  She reports that she still has pain in random places in both extremities.  She denies any pain with walking.  The pain only is elicited with touch.  She has no warmth cold extremities she has not had any nonhealing ulcers on her legs.  She has no wound presently on her leg.  She is seeing a vein specialist presently and having surgery next few weeks.  She is wearing compression stockings and this seems to be helping.  Recommended that she remain on aspirin for now and will order labs today as she has family history of for peripheral artery disease so we will check her cholesterol and also screening for factor V.  Assessment & Plan     Thrombophlebitis leg  Patient was reassured, when I did my exam, jovani areas of tenderness had accompanying spider/varoccose veins. Recommend she continue with aspirin. She is also to wear compression stocking.   - Basic metabolic panel  (Ca, Cl, CO2, Creat, Gluc, K, Na, BUN)    Lipid screening  Patient will be notified of results  - Lipid panel reflex to direct LDL Fasting    History of DVT (deep vein thrombosis)  - Factor 5 leiden mutation analysis    956}     BMI:   Estimated body mass index is 27.36 kg/m  as calculated from the following:    Height as of this encounter: 1.765 m (5' 9.5\").    Weight as of this encounter: 85.3 kg (188 lb).       FUTURE APPOINTMENTS:       - Follow-up visit in one month or sooner as needed.    Return in about 4 weeks (around 2/17/2023) for Follow up.    Verito Ashby MD  [facility]    Keke Cotto is a 67 year old, presenting for the following health issues:  Deep Vein Thrombosis (Follow up from her Virtual Visit on 1-.  Told to come in clinic to have her legs evaluated.  See " clinic note from 1-18-23 regarding her symptoms.   Pain is usually at rest there is no association with trauma.  She is seeing a vein specialist for superficial thrombophlebitis and she was it was recommended that she put on her compression stockings.  Patient has been compliant with that. ), New medication to add (Probiotic taking once daily.), and Imm/Inj (Declined flu and Covid injections today.)    Chief Complaint   Patient presents with     Deep Vein Thrombosis     Follow up from her Virtual Visit on 1-.  Told to come in clinic to have her legs evaluated.  See clinic note from 1-18-23 regarding her symptoms.   Pain is usually at rest there is no association with trauma.  She is seeing a vein specialist for superficial thrombophlebitis and she was it was recommended that she put on her compression stockings.  Patient has been compliant with that.      New medication to add     Probiotic taking once daily.     Imm/Inj     Declined flu and Covid injections today.         History of Present Illness       Reason for visit:  Followup bone density test & blood clot advice    She eats 2-3 servings of fruits and vegetables daily.She consumes 0 sweetened beverage(s) daily.She exercises with enough effort to increase her heart rate 20 to 29 minutes per day.  She exercises with enough effort to increase her heart rate 4 days per week.   She is taking medications regularly.           Review of Systems   Constitutional: Negative.    HENT: Negative.    Eyes: Negative.    Respiratory: Negative.    Cardiovascular: Negative.    Gastrointestinal: Negative.    Endocrine: Negative.    Breasts:  negative.    Genitourinary: Negative.    Musculoskeletal: Positive for arthralgias.   Allergic/Immunologic: Negative.    Neurological: Negative.    Hematological: Negative.    Psychiatric/Behavioral: Negative.             Objective    /78 (BP Location: Right arm, Patient Position: Chair, Cuff Size: Adult Large)   Pulse 82    "Temp 96.9  F (36.1  C) (Tympanic)   Resp 16   Ht 1.765 m (5' 9.5\")   Wt 85.3 kg (188 lb)   SpO2 98%   BMI 27.36 kg/m    Body mass index is 27.36 kg/m .  Physical Exam  Constitutional:       Appearance: Normal appearance.   HENT:      Head: Normocephalic and atraumatic.   Eyes:      Pupils: Pupils are equal, round, and reactive to light.   Cardiovascular:      Rate and Rhythm: Normal rate and regular rhythm.      Pulses: Normal pulses.      Heart sounds: Normal heart sounds.   Pulmonary:      Effort: Pulmonary effort is normal. No respiratory distress.      Breath sounds: Normal breath sounds. No stridor. No wheezing, rhonchi or rales.   Chest:      Chest wall: No tenderness.   Musculoskeletal:         General: Swelling and tenderness present.   Skin:     General: Skin is warm and dry.   Neurological:      Mental Status: She is alert and oriented to person, place, and time.   Psychiatric:         Mood and Affect: Mood normal.         Behavior: Behavior normal.                    "

## 2023-01-27 NOTE — RESULT ENCOUNTER NOTE
Please inform patient that test result was within normal parameters. She is negative for factor v.  Thank you.     Verito Ashby M.D.

## 2023-01-31 ENCOUNTER — OFFICE VISIT (OUTPATIENT)
Dept: VASCULAR SURGERY | Facility: CLINIC | Age: 68
End: 2023-01-31
Attending: SPECIALIST
Payer: MEDICARE

## 2023-01-31 VITALS
HEART RATE: 72 BPM | RESPIRATION RATE: 12 BRPM | TEMPERATURE: 99 F | DIASTOLIC BLOOD PRESSURE: 78 MMHG | SYSTOLIC BLOOD PRESSURE: 136 MMHG

## 2023-01-31 DIAGNOSIS — I83.893 SYMPTOMATIC VARICOSE VEINS OF BOTH LOWER EXTREMITIES: Primary | ICD-10-CM

## 2023-01-31 DIAGNOSIS — I80.9 PHLEBITIS: ICD-10-CM

## 2023-01-31 PROCEDURE — G0463 HOSPITAL OUTPT CLINIC VISIT: HCPCS | Performed by: SPECIALIST

## 2023-01-31 PROCEDURE — 99203 OFFICE O/P NEW LOW 30 MIN: CPT | Performed by: SPECIALIST

## 2023-01-31 ASSESSMENT — PAIN SCALES - GENERAL: PAINLEVEL: MILD PAIN (3)

## 2023-01-31 NOTE — PATIENT INSTRUCTIONS
Ina     Thank you for entrusting your care with us at the [facility].     We are prescribing some compression stockings for you. I have included different suppliers that should help you get measured and fitting to ensure proper fitting socks. You should wear these stockings as much as you can. It is especially important to wear them with long periods of standing, sitting, long car rides or if you will be flying. Compression socks should get refilled every 4-6 months. They do not need to be worn at night while in bed. It is recommended to wear compression level of 20-30mmhg or higher from toes to knees.     We will perform an ultrasound today or prior to your appointment with us in 3 months time to evaluate the valves in your veins.     We would like you to follow up after you have used compression for >3 months after wearing socks to see how you are doing.    Varicose Veins      Varicose veins are swollen, enlarged veins most often found in the legs. They are usually blue or purple in color and may bulge, twist, and stand out under the skin.  Normally, veins return blood from the body to the heart. The leg veins have one-way valves that prevent blood from flowing backward in the vein. When the valves are weak or damaged, blood backs up in the veins. This may cause some of the veins to swell and bulge and become varicose veins.      Symptoms    Varicose veins may or may not cause symptoms. If symptoms do occur, they can include:  Legs that feel tired, achy, heavy, or itchy  Leg muscle cramps  Skin changes, such as discoloration, dryness, redness, or rash (in more severe cases, you may also have sores on the skin called venous leg ulcers)    Pain & Discomfort  Pain, itching, swelling, burning, leg heaviness or tiredness, skin discoloration. Symptoms typically worsen throughout the day and are partially relieved by elevation or wearing compression socks or stockings.    Sometimes,  varicose veins clot and become painful, hot, hard and discolored. This is called phlebitis, an uncomfortable but temporary condition that will get better on its own in 2-3 months. Clots associated with phlebitis are limited to surface veins, and not dangerous - unlike clots in the deep veins (deep vein thrombosis or DVT) that are dangerous because they can travel to the heart or lung and require prompt treatment with blood thinners.    Bleeding  A shower or minor trauma can cause a varicose vein to burst and bleed.    Risk Factors    There are a number of factors that increase the risk for varicose veins. These can include:  Being a woman  Being older  Sitting or standing for long periods  Being overweight  Being pregnant  Having a family history of varicose veins    Among other things, veins are responsible for bringing blood back to the heart, sometimes working against gravity. When you walk, muscles in your leg squeeze the veins and help blood flow back into the heart. In normal veins, a series of valves assist this process. With varicose veins and with a related condition called chronic venous insufficiency, poorly functioning valves allow the blood to pool in the lower leg and cause symptoms.     Treatment begins with simple self-help measures (see below). If these don t help, there are many procedures that can be done to shrink or remove varicose veins. Your healthcare provider can tell you more about these options, if needed.    Home care  Support or compression stockings will likely be prescribed. If so, be sure to wear them as directed. They may help improve blood flow.  Exercising helps strengthen your leg muscles and improve blood flow. To get the most benefit, choose exercises such as walking, swimming, or cycling. Also try to exercise for at least 30 minutes on most days.  Raising (elevating) your legs lets gravity help blood flow back to the heart. Sit or lie with your feet above heart level a few  times throughout the day, or as directed.  Avoid long periods of sitting or standing. Change positions often. Also, move your ankles, toes and knees often. This may also help improve blood flow.  If you are overweight, talk with your healthcare provider about setting up a weight-loss plan. Maintaining a healthy weight can help reduce the strain on your veins. It may also improve symptoms, such as swelling and aching.  If you have dryness and itching, ask your provider about special lotions that can be applied to the skin to help improve symptoms.    When to seek medical advice  Call your healthcare provider right away if any of these occur:  Sudden, severe leg swelling, pain, or redness  Symptoms worsen, or they don t improve with self-care  Bleeding from any affected veins  Ulcers form on the legs, ankles, or feet  Fever of 100.4 F (38 C) or higher, or as advised by your provider      Understanding Spider and Varicose Veins    Do you often hide your legs because of the way they look? You may have noticed tiny red or blue bursts (spider veins). Or maybe you have veins that bulge or look twisted (varicose veins). If so, there are treatments that can help    What are the symptoms?  Spider veins or varicose veins may never be a problem. But sometimes they can cause legs to ache or swell. Your legs may also feel heavy and tired, or like they re burning. These symptoms may be more severe at the end of the day. Prolonged sitting or standing can also make your symptoms worse.    Who gets spider and varicose veins?  Anyone can get spider or varicose veins. But vein problems tend to be hereditary (run in families). Other factors that can affect veins include:  Pregnancy, hormones, and birth control pills  A job where you stand or sit a lot  Extra weight or lack of exercise  Age                       What can be done?  Spider and varicose veins can affect the way you feel about yourself. Talk to your healthcare provider about  your concerns. There are treatments that can ease symptoms and make your legs look better.    Your treatment choices  Treatment may include self-care, sclerotherapy (injecting veins with a chemical), surgery, or newer nonsurgical minimally invasive therapies. Spider veins and some varicose veins can be treated with sclerotherapy. Large varicose veins can often be treated with newer minimally invasive procedures and, in rare cases, surgery may be needed.     Please bring your prescription to a home medical supply store. Here is a list of locations but not limited to.     [facility]  [address] [place] [address]  Phone: [phone]  Fax: [phone] [facility] Bldg.  [address] [place] [address]  Phone: [phone]  Fax: [phone]   [facility]  [address] [place] [address]  Phone: [phone]  Fax: [phone] [facility]  [address]  [address] [place] [address]  Phone: [phone]  Fax: [phone]   [facility]  [address] [place] [address]  Phone: [phone] [facility]   [address] ([facility] [place] [address]  Phone: [phone]   [place]  [address] [place] [address]      Phone: [phone]  Fax: [phone] [place]  [address] [place] [address]      Phone: [phone]  Fax: [phone]     [facility] [email or web address]  [address], [place] [address] [phone]    [facility]  [email or web address]  [address] [place] [address]  Phone: [phone]     [address] [place] [address]  Phone: [phone]    [address] [address]  [place] [address]  Phone: [phone] [address], [place] [address]  Phone: [phone] [address], [place] [address]  Phone: [phone]    [facility] [email or web address]  [address] [place] [address]  Phone: [phone] [address], [place] [address]  Phone: [phone] [address] [place] [address]  Phone: [phone]    [employer]  [email or web address]  [phone]            Reference: [employer] and SVS- Dr Marqeuz James: https://vascular.org/patients-[email or web address]-referring-[email or web address]/conditions/varicose-veins#resource-185      If you have any questions or problems prior to us seeing you at your next scheduled visit please do not hesitate to call us at [phone].    Dr Sergey Soto MD & Irwin MARTINEZ RN

## 2023-01-31 NOTE — PROGRESS NOTES
[facility]        Patient is here for a consult to discuss Varicose vein(s). The patient has varicose veins that are problematic in right, left and bilateral legs. Symptoms patient has been experiencing are  itching, tiredness, cramps, discoloration and  swelling.right phlebitis and history of left 2017. On 325mg aspirin Patient states their varicose veins are bothersome when standing and sitting. Patient  has been wearing thigh high compression compression stockings. Patient  has been using pain medication or anti-inflammatory's. Patient  has and has not had recent imaging on legs done.  Patients sister is present and another sister being treated for PAD and atherosclerosis by Dr Castro.  Had US at [facility] vein clinic in [place]. Will send us the records to review and f/u after 3 months compression.    Irwin Nolan RN

## 2023-02-02 ENCOUNTER — TRANSCRIBE ORDERS (OUTPATIENT)
Dept: VASCULAR SURGERY | Facility: CLINIC | Age: 68
End: 2023-02-02
Payer: MEDICARE

## 2023-02-13 NOTE — PROGRESS NOTES
[facility] Vein Consult      Assessment:     1. varicose veins, bilateral   2. spider veins, bilateral   3. Insuffiencey of left greater saphenous vein,  bilateral acessory saphenous vein    Plan:     1. Treatment options of conservative therapy of stockings use, exercise, weight loss, elevating legs when possible.    2. Script for compression stockings 20-30 mm hg  3. Ultrasound to evaluate legs for incompetency of both deep and superficial system .   4. Surgical treatment, discussed briefly today.  5. Follow up: 3 months.   6. Call for any questions concerns or issues    Subjective:      Ina Otoole is a 67 year old female  who was referred by Verito Ashby  for evaluation of varicose veins. Symptoms include pain, aching, fatigue, burning, edema and dermatitis. Patient has history of leg selling, pain and vein issues that have progressed. Pain and symptoms have affected daily living and work activities needing medications. Here for evaluation today. Stocking use with compression stockings of 20-30 mm hg or greater for greater then 3 months    Allergies:Sulfa drugs    Past Medical History:   Diagnosis Date     Arthritis 2018     Basal cell carcinoma      Calculus of gallbladder with other cholecystitis, without mention of obstruction 06/07/2011     Goiter        Past Surgical History:   Procedure Laterality Date     ABDOMEN SURGERY       ARTHROSCOPY KNEE      left meniscus repair     ARTHROSCOPY KNEE RT/LT      left - meniscus repair     COLONOSCOPY N/A 08/24/2017    Procedure: COLONOSCOPY;  Colonoscopy  ;  Surgeon: Abhishek Escobedo MD;  Location: [facility]     COLONOSCOPY       KNEE SURGERY  09/2013    left knee replacement     LAPAROSCOPIC CHOLECYSTECTOMY  06/29/2011    Procedure:LAPAROSCOPIC CHOLECYSTECTOMY; Surgeon:SUMAYA ZAMORANO; Location:[facility]     LAPAROSCOPIC CHOLECYSTECTOMY       REPLACEMENT TOTAL KNEE Right 08/20/2018    Procedure: RIGHT TOTAL KNEE ARTHROPLASTY COMPUTER  ASSIST;  Surgeon: Kevin Haji MD;  Location: [facility] OR;  Service:      STONE ANALYSIS  2011     THYROID BIOPSY       TOTAL KNEE ARTHROPLASTY Left      TUBAL LIGATION  1980     VASCULAR SURGERY  2018         Current Outpatient Medications:      aspirin (ASA) 325 MG tablet, Take 1 tablet (325 mg) by mouth daily, Disp: 90 tablet, Rfl: 0     Cholecalciferol (VITAMIN D3 PO), Take 25 mcg by mouth daily, Disp: , Rfl:      clobetasol (TEMOVATE) 0.05 % external ointment, Apply topically 2 times daily, Disp: 30 g, Rfl: 5     famotidine (PEPCID) 20 MG tablet, Take 1 tablet (20 mg) by mouth 2 times daily, Disp: 30 tablet, Rfl: 6     Ibuprofen (ADVIL PO), Take 400 mg by mouth 2 times daily, Disp: , Rfl:      multivitamin (ONE-DAILY) tablet, Take 1 tablet by mouth daily Also has Vitamin D 25 mcg in the vitamin., Disp: , Rfl:      senna-docusate (SENOKOT-S;PERICOLACE) 8.6-50 MG per tablet, Take 2 tablets by mouth 2 times daily , Disp: , Rfl:      Family History   Problem Relation Age of Onset     Diabetes Mother      Hypertension Mother      Cerebrovascular Disease Mother      Cancer Mother         melanoma     Dementia Mother      Basal cell carcinoma Mother      Squamous cell carcinoma Mother      Respiratory Father         copd     Cancer - colorectal Father      Bronchitis Father      Hypertension Sister      Multiple Sclerosis Sister      Neurologic Disorder Sister         ms     Diabetes Brother      Hypertension Brother      Breast Cancer Maternal Grandmother      Diabetes Paternal Grandmother      Other - See Comments Daughter         Transverse Myelitis     Asthma No family hx of      Prostate Cancer No family hx of         reports that she quit smoking about 22 years ago. Her smoking use included cigarettes. She has never used smokeless tobacco. She reports current alcohol use. She reports that she does not use drugs.      Review of Systems:    Pertinent items are noted in HPI.  Patient has symptomatic  veins and changes of bilateral legs. These have progressed to the point of causing symptoms on a daily basis. This causes issues with daily activities and chores such as washing dishes, vacuuming, outdoor upkeep and standing for long lengths of time       Objective:     Vitals:    01/31/23 1003   BP: 136/78   Pulse: 72   Resp: 12   Temp: 99  F (37.2  C)     There is no height or weight on file to calculate BMI.    EXAM:  GENERAL: This is a well-developed 67 year old female who appears her stated age  HEAD: normocephalic  HEENT: Pupils equal and reactive bilaterally  MOUTH: mucus membranes intact. Normal dentation  CARDIAC: RRR without murmur  CHEST/LUNG:  Clear to auscultation bilaterally  ABDOMEN: Soft, nontender, nondistended, no masses noted   NEUROLOGIC: Focally intact, nonfocal, alert and oriented x 3  INTEGUMENT: No open lesions or ulcers  VASCULAR: Pulses intact, symmetrical upper and lower extremities. There areskin changes consistent with chronic venous insufficiency. Varicose veins present in bilateral greater saphenous distribution. Spider veins present bilateral.        Side:: Bilateral  VCSS  PAIN:: Severe: Daily, severe limiting activities or requiring regular use of pain meds  Varicose Veins:: Severe: Extensive: Thigh and calf or great and small saphenous distribution  Venous Edema:: Mild: Evening ankle swelling only  Skin Pigmentation:: Absent: None  Inflamation:: Absent: None  Induration:: Absent: None  Number of active ulcers:: 0  Active ulcer duration:: None  Active ulcer diameter:: None  Compression Therapy:: Full compliance stockings + elevation  VCSS Score:: 10  CEAP:: Ankle edema of venous origin (not foot edema)    Imaging:    reviewed outside US results.    Sergey Soto MD  General Surgery [phone]  Vascular Surgery [phone]

## 2023-03-14 ENCOUNTER — OFFICE VISIT (OUTPATIENT)
Dept: VASCULAR SURGERY | Facility: CLINIC | Age: 68
End: 2023-03-14
Attending: SPECIALIST
Payer: MEDICARE

## 2023-03-14 VITALS
TEMPERATURE: 98.6 F | BODY MASS INDEX: 27.36 KG/M2 | OXYGEN SATURATION: 99 % | WEIGHT: 188 LBS | RESPIRATION RATE: 16 BRPM | HEART RATE: 97 BPM | SYSTOLIC BLOOD PRESSURE: 138 MMHG | DIASTOLIC BLOOD PRESSURE: 92 MMHG

## 2023-03-14 DIAGNOSIS — I83.893 SYMPTOMATIC VARICOSE VEINS OF BOTH LOWER EXTREMITIES: Primary | ICD-10-CM

## 2023-03-14 DIAGNOSIS — I87.2 VENOUS (PERIPHERAL) INSUFFICIENCY: ICD-10-CM

## 2023-03-14 DIAGNOSIS — I80.9 PHLEBITIS: ICD-10-CM

## 2023-03-14 PROCEDURE — G0463 HOSPITAL OUTPT CLINIC VISIT: HCPCS | Performed by: SPECIALIST

## 2023-03-14 PROCEDURE — 99213 OFFICE O/P EST LOW 20 MIN: CPT | Performed by: SPECIALIST

## 2023-03-14 ASSESSMENT — PAIN SCALES - GENERAL: PAINLEVEL: MODERATE PAIN (4)

## 2023-03-14 NOTE — PATIENT INSTRUCTIONS
Pre-Procedure Instructions for Varicose Vein Ablation   We look forward to scheduling a varicose vein procedure for you. First, we will submit a prior authorization to your insurance company if required. This typically takes 10-14 days. We will contact you once we have gotten the approval to schedule the procedure.  The following is helpful information for you regarding your treatment:    **Important: A  will be needed post procedure.  (Unable to use Taxi or Uber).    Please allow 1-2 hours for your vein procedure appointment.    Take your routine medications as you normally would except for blood thinners. Aspirin is ok to continue.    If you take Warfarin, Xarelto, or Eliquis this will need to be HELD prior to procedure according to primary care provider or cardiology who prescribes this medication. Please notify us if you take this medication.    We have thigh high compression stocking sizes medium to X-large that will be applied immediately after the procedure. You will need to provide your own if one of these sizes will not fit. Another option is to bring in knee high compression and biker compression shorts.     Please wear comfortable clothing.  We recommend that you bring a change of undergarment in case it gets stained by the cleansing solution.    Feel free to bring a personal music player or a CD to listen to during your procedure.    It is advised not to fly within 3 weeks after the procedure.    You do not have to fast prior to this procedure.  For any questions regarding your procedure please call [phone] to speak with the nurse.  If you would like a Good Stephania Estimate for your upcoming procedure contact Cost of Care Estimates at [phone], advocates are available Monday through Friday 8am - 4:30pm.    Radiofrequency Ablation Codes:   - 92245 for first vein in either leg and 14129 for second vein x2,x2  Varicose veins may be a sign of something more severe - venous reflux  disease.  Healthy leg veins have valves that keep blood flowing to the heart. Venous reflux disease develops when the valves stop working properly and allow blood to flow backward (i.e., reflux) and pool in the lower leg veins.   If venous reflux disease is left untreated, symptoms can worsen over time. Your doctor can help you understand if you have this condition.     Superficial venous reflux disease may cause the following signs and symptoms in your legs:    Varicose veins    Aching    Swelling    Cramping    Heaviness or tiredness    Itching    Restlessness    Open skin sores    Superficial venous reflux disease treatment aims to reduce or stop the backward flow of blood. The following may be prescribed to treat your superficial venous reflux disease. Your doctor can help you decide which treatment is best for you:     Compression stockings     Removing diseased vein     Closing diseased vein (through thermal or non-thermal treatment)     Radiofrequency Ablation (RFA) Treatment for Varicose Veins  Radiofrequency ablation (RFA) is a thermal procedure to treat varicose veins. It uses heat created from radiofrequency (RF). During RFA treatment, RF heat is sent into your vein through a thin, flexible tube (catheter). This closes off blood flow in the main problem vein.           Getting ready for your treatment  Tell your provider if you:    Are pregnant or think you may be pregnant    Are breastfeeding    Smoke, use alcohol or street drugs on a regular basis    Have any allergies or intolerances to certain medicines. Explain what reaction you have had to these medicines in the past.      The day of your treatment    The treatment takes 45 to 60 minutes. The entire treatment (including time to prepare and recover) takes about 1 to 3 hours. You can go home the same day. For the treatment:     You'll lie down on a hospital bed.    An imaging method, such as ultrasound, is used to guide the procedure.    The leg to be  treated is injected with numbing medicine.    Once your leg is numb, a needle makes a small hole (puncture) in the vein to be treated.    The catheter with the RF heat source is inserted into your vein.    More numbing medicine may be injected around your vein.    Once the catheter is in the right position, it is then slowly drawn backward. As the catheter sends out heat, the vein is closed off.    In some cases, other side branch varicose veins may be removed or tied off through a few small cuts (incisions).    When the treatment is done, the catheter is removed. Pressure is applied to the insertion site to stop any bleeding. An elastic compression stocking or a bandage may then be put on your leg.       Recovering at home  Once at home, follow all the instructions you've been given. Be sure to:    Check for signs of infection at the catheter insertion sites (see below)    Wear thigh high compressions as directed    Keep your legs raised (elevated) as directed    Walk a few times a day    Avoid heavy exercise, lifting, and standing for long periods as advised. No lifting >20lbs for 2 weeks.     Avoid air travel, hot baths, saunas, or whirlpools as advised    Do not drive or operate heavy machinery for 24 hours after the procedure    Leakage from the numbing medications the first few hours is normal.          Call your healthcare provider if you have any of the following:    Fever of 100.4 F (38 C) or higher, or as directed by your provider    Chest pain or trouble breathing    Signs of infection at the catheter insertion site. These include increased redness or swelling (inflammation), warmth, increasing pain, bleeding, or bad-smelling discharge.    Severe numbness or tingling in the treated leg    Severe pain or swelling in the treated leg      Follow-up  You'll have an ultrasound within the same week as the procedure to check for problems, such as blood clots. You will follow up with the provider after 6 weeks.    Risks and possible complications   These include the following:  Bleeding, Infection, Blood clots, Damage to the nerves in the treated area, Irritation or burning of the skin over the treated vein. Treatment doesn't improve the look or the symptoms of the problem veins  Risks of any medicines used during the treatment

## 2023-03-14 NOTE — PROGRESS NOTES
"[facility]        Patient is here for a 3 month follow up  to discuss Left ankle phlebitis and  Varicose vein(s). The patient has varicose veins that are problematic in right, left and bilateral legs. Symptoms patient has been experiencing are heaviness, aching, discoloration and  swelling. Patient states their varicose veins are bothersome when \"tired and achy at the end of the day\". Patient  has been wearing compression stockings. Patient \"once in awhile advil\" been using pain medication or anti-inflammatory's. Patient  has had recent imaging on legs done.    Pt is currently taking Aspirin.    There were no vitals taken for this visit.    The provider has been notified that the patient has no concerns.     Questions patient would like addressed today are: N/A.    Refills are needed: No    Has homecare services and agency name:  Cheri Courtney RN      "

## 2023-03-14 NOTE — PROGRESS NOTES
>3 month compression with continued symp VV preauth [place] commercial supplement bilateral GSV/AASV RFA 36475x2 and 36476x2  [facility]

## 2023-03-20 ASSESSMENT — ENCOUNTER SYMPTOMS
SHORTNESS OF BREATH: 1
HEMATURIA: 0
PALPITATIONS: 1
EYE PAIN: 0
WEAKNESS: 1
BREAST MASS: 0
COUGH: 0
ARTHRALGIAS: 1
ABDOMINAL PAIN: 0
DIZZINESS: 0
MYALGIAS: 1
HEADACHES: 0
HEARTBURN: 0
SORE THROAT: 0
NAUSEA: 0
PARESTHESIAS: 1
CONSTIPATION: 0
HEMATOCHEZIA: 0
FREQUENCY: 0
NERVOUS/ANXIOUS: 0
FEVER: 0
CHILLS: 0
DYSURIA: 0
DIARRHEA: 0

## 2023-03-20 ASSESSMENT — ACTIVITIES OF DAILY LIVING (ADL): CURRENT_FUNCTION: NO ASSISTANCE NEEDED

## 2023-03-27 ENCOUNTER — OFFICE VISIT (OUTPATIENT)
Dept: FAMILY MEDICINE | Facility: CLINIC | Age: 68
End: 2023-03-27
Payer: MEDICARE

## 2023-03-27 VITALS
HEART RATE: 80 BPM | WEIGHT: 191 LBS | RESPIRATION RATE: 16 BRPM | TEMPERATURE: 96.1 F | SYSTOLIC BLOOD PRESSURE: 132 MMHG | HEIGHT: 70 IN | DIASTOLIC BLOOD PRESSURE: 72 MMHG | BODY MASS INDEX: 27.35 KG/M2

## 2023-03-27 DIAGNOSIS — Z12.31 VISIT FOR SCREENING MAMMOGRAM: ICD-10-CM

## 2023-03-27 DIAGNOSIS — Z00.00 ROUTINE GENERAL MEDICAL EXAMINATION AT A HEALTH CARE FACILITY: Primary | ICD-10-CM

## 2023-03-27 DIAGNOSIS — Z23 NEED FOR VACCINATION: ICD-10-CM

## 2023-03-27 PROCEDURE — G0439 PPPS, SUBSEQ VISIT: HCPCS | Performed by: FAMILY MEDICINE

## 2023-03-27 PROCEDURE — 90471 IMMUNIZATION ADMIN: CPT | Performed by: FAMILY MEDICINE

## 2023-03-27 PROCEDURE — 90750 HZV VACC RECOMBINANT IM: CPT | Performed by: FAMILY MEDICINE

## 2023-03-27 PROCEDURE — 90677 PCV20 VACCINE IM: CPT | Performed by: FAMILY MEDICINE

## 2023-03-27 PROCEDURE — G0009 ADMIN PNEUMOCOCCAL VACCINE: HCPCS | Mod: 59 | Performed by: FAMILY MEDICINE

## 2023-03-27 ASSESSMENT — PAIN SCALES - GENERAL: PAINLEVEL: NO PAIN (0)

## 2023-03-27 ASSESSMENT — ACTIVITIES OF DAILY LIVING (ADL): CURRENT_FUNCTION: NO ASSISTANCE NEEDED

## 2023-03-27 NOTE — PROGRESS NOTES
"SUBJECTIVE:   Kirti is a 68 year old who presents for Preventive Visit.    Patient is a 68-year-old here for her annual physical.  She is relatively healthy and takes only over-the-counter medication.  She reports no acute symptoms today.  She was reminded of immunizations that she is lacking on.  She is open to getting a shingles vaccine and a pneumonia vaccination.  She is otherwise doing well colonoscopy not due till 2027 for and mammogram not due.  No flowsheet data found.{Split Bill scripting  The purpose of this visit is to discuss your medical history and prevent health problems before you are sick. You may be responsible for a co-pay, coinsurance, or deductible if your visit today includes services such as checking on a sore throat, having an x-ray or lab test, or treating and evaluating a new or existing condition    Are you in the first 12 months of your Medicare coverage?  No    Healthy Habits:     In general, how would you rate your overall health?  Good    Frequency of exercise:  2-3 days/week    Duration of exercise:  15-30 minutes    Do you usually eat at least 4 servings of fruit and vegetables a day, include whole grains    & fiber and avoid regularly eating high fat or \"junk\" foods?  No    Taking medications regularly:  Yes    Medication side effects:  None    Ability to successfully perform activities of daily living:  No assistance needed    Home Safety:  No safety concerns identified    Hearing Impairment:  Need to ask people to speak up or repeat themselves    In the past 6 months, have you been bothered by leaking of urine?  No    In general, how would you rate your overall mental or emotional health?  Good      PHQ-2 Total Score: 0    Additional concerns today:  Yes      Have you ever done Advance Care Planning? (For example, a Health Directive, POLST, or a discussion with a medical provider or your loved ones about your wishes): Yes, advance care planning is on file.       Fall risk  Fallen " 2 or more times in the past year?: No  Any fall with injury in the past year?: No    Cognitive Screening   1) Repeat 3 items (Leader, Season, Table)    2) Clock draw: NORMAL  3) 3 item recall: Recalls 2 objects   Results: NORMAL clock, 1-2 items recalled: COGNITIVE IMPAIRMENT LESS LIKELY    Mini-CogTM Copyright KANDACE Plunkett. Licensed by the author for use in [facility]; reprinted with permission (gisel@[facility]). All rights reserved.          Reviewed and updated as needed this visit by clinical staff   Tobacco  Allergies  Meds  Problems  Med Hx  Surg Hx           Reviewed and updated as needed this visit by Provider    Allergies  Meds  Problems  Med Hx  Surg Hx          Social History     Tobacco Use     Smoking status: Former     Packs/day: 0.00     Years: 0.00     Pack years: 0.00     Types: Cigarettes     Quit date: 2000     Years since quittin.1     Smokeless tobacco: Never   Substance Use Topics     Alcohol use: Yes     Comment: only for special occassions         Alcohol Use 3/20/2023   Prescreen: >3 drinks/day or >7 drinks/week? No     Do you have a current opioid prescription? No  Do you use any other controlled substances or medications that are not prescribed by a provider? None        Current providers sharing in care for this patient include:   Patient Care Team:  Verito Ashby MD as PCP - General (Family Practice)  Verito Ashby MD as Assigned PCP  Tiffany Zuleta PA-C as Physician Assistant (Dermatology)  Sergey Soto MD as Assigned Surgical Provider    The following health maintenance items are reviewed in Epic and correct as of today:  Health Maintenance   Topic Date Due     COVID-19 Vaccine (1) Never done     INFLUENZA VACCINE (1) 2022     MEDICARE ANNUAL WELLNESS VISIT  2023     MAMMO SCREENING  2023     ZOSTER IMMUNIZATION (2 of 2) 2023     ANNUAL REVIEW OF HM ORDERS  11/15/2023     FALL RISK ASSESSMENT   03/27/2024     DTAP/TDAP/TD IMMUNIZATION (4 - Td or Tdap) 10/03/2026     COLORECTAL CANCER SCREENING  08/24/2027     LIPID  01/20/2028     ADVANCE CARE PLANNING  03/27/2028     DEXA  01/12/2038     HEPATITIS C SCREENING  Completed     PHQ-2 (once per calendar year)  Completed     Pneumococcal Vaccine: 65+ Years  Completed     IPV IMMUNIZATION  Aged Out     MENINGITIS IMMUNIZATION  Aged Out     Lab work is in process  Labs reviewed in EPIC  BP Readings from Last 3 Encounters:   03/27/23 132/72   03/14/23 (!) 138/92   01/31/23 136/78    Wt Readings from Last 3 Encounters:   03/27/23 86.6 kg (191 lb)   03/14/23 85.3 kg (188 lb)   01/20/23 85.3 kg (188 lb)                  Patient Active Problem List   Diagnosis     Esophageal reflux     Myalgia and myositis     Goiter     Status post total left knee replacement     CARDIOVASCULAR SCREENING; LDL GOAL LESS THAN 160     Renal calculus or stone     IBS (irritable bowel syndrome)     Advanced directives, counseling/discussion     Thrombophlebitis leg     Vulvar ulcer     Past Surgical History:   Procedure Laterality Date     ABDOMEN SURGERY       ARTHROSCOPY KNEE      left meniscus repair     ARTHROSCOPY KNEE RT/LT      left - meniscus repair     COLONOSCOPY N/A 08/24/2017    Procedure: COLONOSCOPY;  Colonoscopy  ;  Surgeon: Abhishek Escobedo MD;  Location: [facility]     COLONOSCOPY       KNEE SURGERY  09/2013    left knee replacement     LAPAROSCOPIC CHOLECYSTECTOMY  06/29/2011    Procedure:LAPAROSCOPIC CHOLECYSTECTOMY; Surgeon:SUMAYA ZAMORANO; Location:[facility]     LAPAROSCOPIC CHOLECYSTECTOMY       REPLACEMENT TOTAL KNEE Right 08/20/2018    Procedure: RIGHT TOTAL KNEE ARTHROPLASTY COMPUTER ASSIST;  Surgeon: Kevin Haji MD;  Location: [facility] OR;  Service:      STONE ANALYSIS  2011     THYROID BIOPSY       TOTAL KNEE ARTHROPLASTY Left      TUBAL LIGATION  1980     VASCULAR SURGERY  2018       Social History     Tobacco Use     Smoking status: Former      Packs/day: 0.00     Years: 0.00     Pack years: 0.00     Types: Cigarettes     Quit date: 2000     Years since quittin.1     Smokeless tobacco: Never   Substance Use Topics     Alcohol use: Yes     Comment: only for special occassions     Family History   Problem Relation Age of Onset     Diabetes Mother      Hypertension Mother      Cerebrovascular Disease Mother      Cancer Mother         melanoma     Dementia Mother      Basal cell carcinoma Mother      Squamous cell carcinoma Mother      Respiratory Father         copd     Cancer - colorectal Father      Bronchitis Father      Hypertension Sister      Multiple Sclerosis Sister      Neurologic Disorder Sister         ms     Diabetes Brother      Hypertension Brother      Breast Cancer Maternal Grandmother      Diabetes Paternal Grandmother      Other - See Comments Daughter         Transverse Myelitis     Asthma No family hx of      Prostate Cancer No family hx of          Current Outpatient Medications   Medication Sig Dispense Refill     aspirin (ASA) 325 MG tablet Take 1 tablet (325 mg) by mouth daily 90 tablet 0     Cholecalciferol (VITAMIN D3 PO) Take 25 mcg by mouth daily       clobetasol (TEMOVATE) 0.05 % external ointment Apply topically 2 times daily 30 g 5     famotidine (PEPCID) 20 MG tablet Take 1 tablet (20 mg) by mouth 2 times daily 30 tablet 6     Ibuprofen (ADVIL PO) Take 400 mg by mouth 2 times daily       multivitamin (ONE-DAILY) tablet Take 1 tablet by mouth daily Also has Vitamin D 25 mcg in the vitamin.       senna-docusate (SENOKOT-S;PERICOLACE) 8.6-50 MG per tablet Take 2 tablets by mouth 2 times daily        Allergies   Allergen Reactions     Sulfa Drugs Hives     Pneumonia Vaccine:For adults 65 years or older who do not have an immunocompromising condition, cerebrospinal fluid leak, or cochlear implant and want to receive PPSV23 ONLY: Administer 1 dose of PPSV23. Anyone who received any doses of PPSV23 before age 65 should  "receive 1 final dose of the vaccine at age 65 or older. Administer this last dose at least 5 years after the prior PPSV23 dose.  Mammogram Screening: Mammogram Screening - Patient over age 75, has elected to discontinue screenings.        Review of Systems  Constitutional, HEENT, cardiovascular, pulmonary, gi and gu systems are negative, except as otherwise noted.    OBJECTIVE:   /72 (BP Location: Left arm, Patient Position: Sitting, Cuff Size: Adult Regular)   Pulse 80   Temp (!) 96.1  F (35.6  C) (Tympanic)   Resp 16   Ht 1.765 m (5' 9.5\")   Wt 86.6 kg (191 lb)   BMI 27.80 kg/m   Estimated body mass index is 27.8 kg/m  as calculated from the following:    Height as of this encounter: 1.765 m (5' 9.5\").    Weight as of this encounter: 86.6 kg (191 lb).  Physical Exam  GENERAL: healthy, alert and no distress  EYES: Eyes grossly normal to inspection, PERRL and conjunctivae and sclerae normal  HENT: ear canals and TM's normal, nose and mouth without ulcers or lesions  NECK: no adenopathy, no asymmetry, masses, or scars and thyroid normal to palpation  RESP: lungs clear to auscultation - no rales, rhonchi or wheezes  CV: regular rate and rhythm, normal S1 S2, no S3 or S4, no murmur, click or rub, no peripheral edema and peripheral pulses strong  ABDOMEN: soft, nontender, no hepatosplenomegaly, no masses and bowel sounds normal  MS: no gross musculoskeletal defects noted, no edema  SKIN: no suspicious lesions or rashes  PSYCH: mentation appears normal, affect normal/bright    Diagnostic Test Results:  none     ASSESSMENT / PLAN:       ICD-10-CM    1. Routine general medical examination at a health care facility  Z00.00       2. Visit for screening mammogram  Z12.31 MA SCREENING DIGITAL BILAT - Future  (s+30)      3. Need for vaccination  Z23 ZOSTER VACCINE RECOMBINANT ADJUVANTED (SHINGRIX)     Pneumococcal 20 Valent Conjugate (PCV20)          Patient has been advised of split billing requirements and " "indicates understanding: Yes      COUNSELING:  Reviewed preventive health counseling, as reflected in patient instructions       Regular exercise       Healthy diet/nutrition      BMI:   Estimated body mass index is 27.8 kg/m  as calculated from the following:    Height as of this encounter: 1.765 m (5' 9.5\").    Weight as of this encounter: 86.6 kg (191 lb).         She reports that she quit smoking about 23 years ago. Her smoking use included cigarettes. She has never used smokeless tobacco.      Appropriate preventive services were discussed with this patient, including applicable screening as appropriate for cardiovascular disease, diabetes, osteopenia/osteoporosis, and glaucoma.  As appropriate for age/gender, discussed screening for colorectal cancer, prostate cancer, breast cancer, and cervical cancer. Checklist reviewing preventive services available has been given to the patient.    Reviewed patients plan of care and provided an AVS. The Basic Care Plan (routine screening as documented in Health Maintenance) for Ina meets the Care Plan requirement. This Care Plan has been established and reviewed with the Patient.    {Counseling Resources  [place] Preventive Services Task Force  Cholesterol Screening  Health diet/nutrition  Pooled Cohorts Equation Calculator  CollabFinder's MyPlate  ASA Prophylaxis  Lung CA Screening  Osteoporosis prevention/bone health  {Breast Cancer Risk Calculator  BRCA-Related Cancer Risk Assessment FHS-7 Tool     Verito Ashby MD  [facility]    Identified Health Risks:    I have reviewed Opioid Use Disorder and Substance Use Disorder risk factors and made any needed referrals.     "

## 2023-03-27 NOTE — PATIENT INSTRUCTIONS
Preventive Health Recommendations    See your health care provider every year to    Review health changes.     Discuss preventive care.      Review your medicines if your doctor has prescribed any.      You no longer need a yearly Pap test unless you've had an abnormal Pap test in the past 10 years. If you have vaginal symptoms, such as bleeding or discharge, be sure to talk with your provider about a Pap test.      Every 1 to 2 years, have a mammogram.  If you are over 69, talk with your health care provider about whether or not you want to continue having screening mammograms.      Every 10 years, have a colonoscopy. Or, have a yearly FIT test (stool test). These exams will check for colon cancer.       Have a cholesterol test every 5 years, or more often if your doctor advises it.       Have a diabetes test (fasting glucose) every three years. If you are at risk for diabetes, you should have this test more often.       At age 65, have a bone density scan (DEXA) to check for osteoporosis (brittle bone disease).    Shots:    Get a flu shot each year.    Get a tetanus shot every 10 years.    Talk to your doctor about your pneumonia vaccines. There are now two you should receive - Pneumovax (PPSV 23) and Prevnar (PCV 13).    Talk to your pharmacist about the shingles vaccine.    Talk to your doctor about the hepatitis B vaccine.    Nutrition:     Eat at least 5 servings of fruits and vegetables each day.      Eat whole-grain bread, whole-wheat pasta and brown rice instead of white grains and rice.      Get adequate about Calcium and Vitamin D.     Lifestyle    Exercise at least 150 minutes a week (30 minutes a day, 5 days a week). This will help you control your weight and prevent disease.      Limit alcohol to one drink per day.      No smoking.       Wear sunscreen to prevent skin cancer.       See your dentist twice a year for an exam and cleaning.      See your eye doctor every 1 to 2 years to screen for  conditions such as glaucoma, macular degeneration, cataracts, etc.    Personalized Prevention Plan  You are due for the preventive services outlined below.  Your care team is available to assist you in scheduling these services.  If you have already completed any of these items, please share that information with your care team to update in your medical record.    Health Maintenance Due   Topic Date Due     COVID-19 Vaccine (1) Never done     Zoster (Shingles) Vaccine (1 of 2) Never done     Pneumococcal Vaccine (2 - PPSV23 if available, else PCV20) 08/02/2019     Flu Vaccine (1) 09/01/2022     Annual Wellness Visit  03/22/2023     Mammogram  04/22/2023

## 2023-04-04 NOTE — PROGRESS NOTES
Follow Up: Varicose Veins/ Venous Insufficiency     Ina Otoole is a 68 year old  female here for followup. She has worn stockings now for 3         months. I saw them previously in January / 31 / 2023.  Continued progression of disease and symptoms and issues; reviewed ultrasound results. Patient has ongoing symptoms with pain and swelling needing intervention with pain meds secondary to interfering with daily activities and work. This now inhibits daily chores and activities.     Allergies:Sulfa drugs    Past Medical History:   Diagnosis Date     Arthritis 2018     Basal cell carcinoma      Calculus of gallbladder with other cholecystitis, without mention of obstruction 06/07/2011     Goiter        Past Surgical History:   Procedure Laterality Date     ABDOMEN SURGERY       ARTHROSCOPY KNEE      left meniscus repair     ARTHROSCOPY KNEE RT/LT      left - meniscus repair     COLONOSCOPY N/A 08/24/2017    Procedure: COLONOSCOPY;  Colonoscopy  ;  Surgeon: Abhishek Escobedo MD;  Location: [facility]     COLONOSCOPY       KNEE SURGERY  09/2013    left knee replacement     LAPAROSCOPIC CHOLECYSTECTOMY  06/29/2011    Procedure:LAPAROSCOPIC CHOLECYSTECTOMY; Surgeon:SUMAYA ZAMORANO; Location:[facility]     LAPAROSCOPIC CHOLECYSTECTOMY       REPLACEMENT TOTAL KNEE Right 08/20/2018    Procedure: RIGHT TOTAL KNEE ARTHROPLASTY COMPUTER ASSIST;  Surgeon: Kevin Haji MD;  Location: [facility] OR;  Service:      STONE ANALYSIS  2011     THYROID BIOPSY       TOTAL KNEE ARTHROPLASTY Left      TUBAL LIGATION  1980     VASCULAR SURGERY  2018       CURRENT MEDS:  Current Outpatient Medications   Medication     aspirin (ASA) 325 MG tablet     Cholecalciferol (VITAMIN D3 PO)     clobetasol (TEMOVATE) 0.05 % external ointment     famotidine (PEPCID) 20 MG tablet     Ibuprofen (ADVIL PO)     multivitamin (ONE-DAILY) tablet     senna-docusate (SENOKOT-S;PERICOLACE) 8.6-50 MG per tablet     Current Facility-Administered  Medications   Medication     lidocaine 112 mL, EPINEPHrine (ADRENALIN) 1.12 mg in sodium chloride 0.9 % 1,113.12 mL (TUMESCENT)     lidocaine 112 mL, EPINEPHrine (ADRENALIN) 1.12 mg in sodium chloride 0.9 % 1,113.12 mL (TUMESCENT)       Family History   Problem Relation Age of Onset     Diabetes Mother      Hypertension Mother      Cerebrovascular Disease Mother      Cancer Mother         melanoma     Dementia Mother      Basal cell carcinoma Mother      Squamous cell carcinoma Mother      Respiratory Father         copd     Cancer - colorectal Father      Bronchitis Father      Hypertension Sister      Multiple Sclerosis Sister      Neurologic Disorder Sister         ms     Diabetes Brother      Hypertension Brother      Breast Cancer Maternal Grandmother      Diabetes Paternal Grandmother      Other - See Comments Daughter         Transverse Myelitis     Asthma No family hx of      Prostate Cancer No family hx of         reports that she quit smoking about 23 years ago. Her smoking use included cigarettes. She has never used smokeless tobacco. She reports current alcohol use. She reports that she does not use drugs.    Review of Systems:  Negative except progression of symptoms and issues while wearing compression and conservative therapy.  Patient has symptomatic veins and changes of bilateral legs. These have progressed to the point of causing symptoms on a daily basis. This causes issues with daily activities and chores such as washing dishes, vacuuming, outdoor upkeep and standing for long lengths of time. She has worn compression now for greater than 3 months, worked on an exercise and weight loss program and continues to have symptoms.     OBJECTIVE:  Vitals:    03/14/23 1008   BP: (!) 138/92   Pulse: 97   Resp: 16   Temp: 98.6  F (37  C)   SpO2: 99%   Weight: 85.3 kg (188 lb)     Body mass index is 27.36 kg/m .    Status: having discomfort    EXAM:  GENERAL: This is a well-developed 68 year old female who  appears her stated age  HEAD: normocephalic  HEENT: Pupils equal and reactive bilaterally  CARDIAC: RRR without murmur  CHEST/LUNG:  Clear to auscultation  ABDOMEN: Soft, nontender, nondistended, no masses    NEUROLOGIC: Focally intact, nonfocal  VASCULAR: Pulses intact, symmetrical upper and lower extremities. Varicose veins, Spider veins and Chronic venous stasis changes, bilateral                                  Side:: Bilateral  VCSS  PAIN:: Moderate: Daily moderate activity limitation, occasional pain medication  Varicose Veins:: Severe: Extensive: Thigh and calf or great and small saphenous distribution  Venous Edema:: Mild: Evening ankle swelling only  Skin Pigmentation:: Absent: None  Inflamation:: Absent: None  Induration:: Absent: None  Number of active ulcers:: 0  Active ulcer duration:: None  Active ulcer diameter:: None  Compression Therapy:: Full compliance stockings + elevation  VCSS Score:: 9  CEAP:: Ankle edema of venous origin (not foot edema)    Imaging:  Reviewed images and reports from outside vein clinic showing reflux and insuffiencey of bilateral greater and anterior accessory saphenous veins.    Assessment/Plan:    She has  incompetency and insuffiencey of the Bilateral  Greater and Accessory  saphenous vein.  Right measures 5.9 mm at the junction, left measures 7.3mm. Deep systems are intact. No DVTs. Great candidate for endovenous  closure. We spent 20 minutes today and discussed the procedure. The risks of anesthesia, infection, bleeding, clotting, DVTs, numbess at the insertion site dermatome and  the process and procedure were discussed. Answered all questions today. Will submit this to Ina CLAUDINE Otoole's insurance if needed for pre approval.Will set this up when approved. Discussed need to have a  and procedure woul take around 30 minutes with total time 2-3 hours. Also understands a small screening ultrasound 2-3 days out to rule out clot formation at the closed  vessel.    DIAGNOSIS: Venous insufficiency of the  right greater saphenous vein, left greater saphenous vein, right anterior accessory saphenous vein and  left anterior accessory vein      PROCEDURE: Endovenous closure of the right greater saphenous vein, left greater saphenous vein, right anterior accessory saphenous vein and  left anterior accessory vein    Sergey Soto MD  [facility] Surgery Dept.

## 2023-04-13 ENCOUNTER — HOSPITAL ENCOUNTER (OUTPATIENT)
Dept: MAMMOGRAPHY | Facility: CLINIC | Age: 68
Discharge: HOME | End: 2023-04-13
Attending: FAMILY MEDICINE | Admitting: FAMILY MEDICINE
Payer: MEDICARE

## 2023-04-13 DIAGNOSIS — Z12.31 VISIT FOR SCREENING MAMMOGRAM: ICD-10-CM

## 2023-04-13 PROCEDURE — 77067 SCR MAMMO BI INCL CAD: CPT

## 2023-05-10 ENCOUNTER — VIRTUAL VISIT (OUTPATIENT)
Dept: FAMILY MEDICINE | Facility: CLINIC | Age: 68
End: 2023-05-10
Payer: MEDICARE

## 2023-05-10 DIAGNOSIS — R19.7 DIARRHEA, UNSPECIFIED TYPE: Primary | ICD-10-CM

## 2023-05-10 DIAGNOSIS — K56.7 ILEUS, UNSPECIFIED (H): ICD-10-CM

## 2023-05-10 PROCEDURE — 99441 PR PHYSICIAN TELEPHONE EVALUATION 5-10 MIN: CPT | Mod: 95 | Performed by: FAMILY MEDICINE

## 2023-05-10 ASSESSMENT — ENCOUNTER SYMPTOMS
ENDOCRINE NEGATIVE: 1
MUSCULOSKELETAL NEGATIVE: 1
HEMATOLOGIC/LYMPHATIC NEGATIVE: 1
ABDOMINAL PAIN: 1
ALLERGIC/IMMUNOLOGIC NEGATIVE: 1
NEUROLOGICAL NEGATIVE: 1
UNEXPECTED WEIGHT CHANGE: 1
PSYCHIATRIC NEGATIVE: 1
DIARRHEA: 1

## 2023-05-10 NOTE — PROGRESS NOTES
"Kirti is a 68 year old who is being evaluated via a billable telephone visit.      What phone number would you like to be contacted at? [phone].  How would you like to obtain your AVS? Meghanahart  Distant Location (provider location):  Off-site    Assessment & Plan     (R19.7) Diarrhea, unspecified type  (primary encounter diagnosis)  Comment: had a long discussion with patient. Recommend stool studies. Maybe microscopic colitis or maybe the IBS is worsening. Patient encouraged to get the tests done soon.   Plan: Comprehensive metabolic panel (BMP + Alb, Alk         Phos, ALT, AST, Total. Bili, TP), Colonoscopy         Screening  Referral, Enteric Bacteria         and Virus Panel by DENISA Stool, Ova and Parasite         Exam Routine, C. difficile Toxin B PCR with         reflex to C. difficile Antigen and Toxins A/B         EIA        (K56.7) Ileus, unspecified (H)  Comment: Patient will be notified of results  Plan: Enteric Bacteria and Virus Panel by DENISA Stool                       BMI:   Estimated body mass index is 27.8 kg/m  as calculated from the following:    Height as of 3/27/23: 1.765 m (5' 9.5\").    Weight as of 3/27/23: 86.6 kg (191 lb).       FUTURE APPOINTMENTS:       - Follow-up visit in 1-2 weeks or sooner as needed    Verito Ashby MD  [facility]    Keke Cotto is a 68 year old, presenting for the following health issues:  Patient is a 68-year-old female presenting for diarrhea occurring frequently  in the last 2 or 3 months.      She reports that on occasion she will have very severe explosive diarrhea associated with abdominal cramping and some mucus in her stool.  She reports no blood.  No fevers or chills.  Sometimes it is related to eating.  This has started to affect her appetite and she has lost some weight.  Of note she has a history of IBS.  Her last colonoscopy was in 2017 and it was normal.  Abdominal Pain (Possible colitis attacks x 2 " months.)        5/10/2023     2:26 PM   Additional Questions   Roomed by Yara Gerber CMA     Abdominal Pain   Associated symptoms include diarrhea.   History of Present Illness       Reason for visit:  Gastrointestinal pain, cramping, diarrhea, lasting days to varying  degrees. Diarrhea up to 6×day. Some symptoms started several months ago and getting steadily worse. This last episode was the worst.  Symptom onset:  More than a month  Symptoms include:  Pain/cramping before and during passing stool. Pain &cramps for days with many trip to bathroom until i finally go. Chills but temp normal. Loss of weight 5 lbs in 4 days.  Symptom intensity:  Severe  Symptom progression:  Staying the same  Had these symptoms before:  Yes  Has tried/received treatment for these symptoms:  No  What makes it worse:  Eating  What makes it better:  Not eating    She eats 2-3 servings of fruits and vegetables daily.She consumes 0 sweetened beverage(s) daily.She exercises with enough effort to increase her heart rate 20 to 29 minutes per day.  She exercises with enough effort to increase her heart rate 5 days per week.   She is taking medications regularly.             Review of Systems   Constitutional: Positive for unexpected weight change.   HENT: Negative.    Gastrointestinal: Positive for abdominal pain and diarrhea.   Endocrine: Negative.    Breasts:  negative.    Genitourinary: Negative.    Musculoskeletal: Negative.    Skin: Negative.    Allergic/Immunologic: Negative.    Neurological: Negative.    Hematological: Negative.    Psychiatric/Behavioral: Negative.             Objective           Vitals:  No vitals were obtained today due to virtual visit.    Physical Exam   healthy, alert and no distress  PSYCH: Alert and oriented times 3; coherent speech, normal   rate and volume, able to articulate logical thoughts, able   to abstract reason, no tangential thoughts, no hallucinations   or delusions  Her affect is normal  RESP: No  cough, no audible wheezing, able to talk in full sentences  Remainder of exam unable to be completed due to telephone visits            Phone call duration: 10 minutes

## 2023-05-11 ENCOUNTER — LAB (OUTPATIENT)
Dept: LAB | Facility: CLINIC | Age: 68
End: 2023-05-11
Payer: MEDICARE

## 2023-05-11 DIAGNOSIS — K56.7 ILEUS, UNSPECIFIED (H): ICD-10-CM

## 2023-05-11 DIAGNOSIS — R19.7 DIARRHEA, UNSPECIFIED TYPE: ICD-10-CM

## 2023-05-11 LAB
ALBUMIN SERPL BCG-MCNC: 4.5 G/DL (ref 3.5–5.2)
ALP SERPL-CCNC: 54 U/L (ref 35–104)
ALT SERPL W P-5'-P-CCNC: 24 U/L (ref 10–35)
ANION GAP SERPL CALCULATED.3IONS-SCNC: 9 MMOL/L (ref 7–15)
AST SERPL W P-5'-P-CCNC: 21 U/L (ref 10–35)
BILIRUB SERPL-MCNC: 0.8 MG/DL
BUN SERPL-MCNC: 11.7 MG/DL (ref 8–23)
C DIFF TOX B STL QL: NEGATIVE
CALCIUM SERPL-MCNC: 10 MG/DL (ref 8.8–10.2)
CHLORIDE SERPL-SCNC: 106 MMOL/L (ref 98–107)
CREAT SERPL-MCNC: 0.9 MG/DL (ref 0.51–0.95)
DEPRECATED HCO3 PLAS-SCNC: 27 MMOL/L (ref 22–29)
GFR SERPL CREATININE-BSD FRML MDRD: 68 ML/MIN/1.73M2
GLUCOSE SERPL-MCNC: 96 MG/DL (ref 70–99)
POTASSIUM SERPL-SCNC: 4.1 MMOL/L (ref 3.4–5.3)
PROT SERPL-MCNC: 6.8 G/DL (ref 6.4–8.3)
SODIUM SERPL-SCNC: 142 MMOL/L (ref 136–145)

## 2023-05-11 PROCEDURE — 87177 OVA AND PARASITES SMEARS: CPT

## 2023-05-11 PROCEDURE — 87493 C DIFF AMPLIFIED PROBE: CPT | Mod: 59

## 2023-05-11 PROCEDURE — 87506 IADNA-DNA/RNA PROBE TQ 6-11: CPT

## 2023-05-11 PROCEDURE — 80053 COMPREHEN METABOLIC PANEL: CPT

## 2023-05-11 PROCEDURE — 36415 COLL VENOUS BLD VENIPUNCTURE: CPT

## 2023-05-11 PROCEDURE — 87209 SMEAR COMPLEX STAIN: CPT

## 2023-05-12 LAB
C COLI+JEJUNI+LARI FUSA STL QL NAA+PROBE: NOT DETECTED
EC STX1 GENE STL QL NAA+PROBE: NOT DETECTED
EC STX2 GENE STL QL NAA+PROBE: NOT DETECTED
NOROV GI+II ORF1-ORF2 JNC STL QL NAA+PR: NOT DETECTED
O+P STL MICRO: NEGATIVE
RVA NSP5 STL QL NAA+PROBE: NOT DETECTED
SALMONELLA SP RPOD STL QL NAA+PROBE: NOT DETECTED
SHIGELLA SP+EIEC IPAH STL QL NAA+PROBE: NOT DETECTED
TRI STN SPEC: NORMAL
V CHOL+PARA RFBL+TRKH+TNAA STL QL NAA+PR: NOT DETECTED
Y ENTERO RECN STL QL NAA+PROBE: NOT DETECTED

## 2023-05-15 ENCOUNTER — TELEPHONE (OUTPATIENT)
Dept: SURGERY | Facility: CLINIC | Age: 68
End: 2023-05-15
Payer: MEDICARE

## 2023-05-15 NOTE — TELEPHONE ENCOUNTER
Screening Questions  BLUE  KIND OF PREP RED  LOCATION [review exclusion criteria] GREEN  SEDATION TYPE        y Are you active on mychart?       [place] Ordering/Referring Provider?        Medicare What type of coverage do you have?      n Have you had a positive covid test in the last 14 days?     27.80 1. BMI  [BMI 40+ - review exclusion criteria& smart-phrase document]    y  2. Are you able to give consent for your medical care? [IF NO,RN REVIEW]          n  3. Are you taking any prescription pain medications on a routine schedule   (ex narcotics: oxycodone, roxicodone, oxycontin,  and percocet)? [RN Review]        n  3a. EXTENDED PREP What kind of prescription?     n 4. Do you have any chemical dependencies such as alcohol, street drugs, or methadone?        **If yes 3- 5 , please schedule with MAC sedation.**          IF YES TO ANY 6 - 10 - HOSPITAL SETTING ONLY.     n 6.   Do you need assistance transferring?     n 7.   Have you had a heart or lung transplant?    n 8.   Are you currently on dialysis?   n 9.   Do you use daily home oxygen?   n 10. Do you take nitroglycerin?   10a. n If yes, how often?     n 11. Are you currently pregnant?    11a. n If yes, how many weeks? [ Greater than 12 weeks, OR NEEDED]    n 12. Do you have Pulmonary Hypertension? *NEED PAC APPT AT [facility] w/ MAC*     n 13. [review exclusion criteria]  Do you have any implantable devices in your body (pacemaker, defib, LVAD)?    n 14. In the past 6 months, have you had any heart related issues including cardiomyopathy or heart attack?     14a. n If yes, did it require cardiac stenting if so when?     n 15. Have you had a stroke or Transient ischemic attack (TIA - aka  mini stroke ) within 6 months?      n 16. Do you have mod to severe Obstructive Sleep Apnea?  [Hospital only]    n 17. Do you have SEVERE AND UNCONTROLLED asthma? *NEED PAC APPT AT [facility] w/MAC*     18.Do you take blood thinners?  No    n 19. Do you take the medication named  "Phentermine?    19a. If yes, \"Hold for 7 days before procedure.  Please consult your prescribing provider if you have questions about holding this medication.\"     n  20. Do you have chronic kidney disease?      n  21. Do you have a diagnosis of diabetes?     n  22. On a regular basis do you go 3-5 days between bowel movements?     y 23. Preferred LOCAL Pharmacy for Pre Prescription      [facility] - [place], [place] - [address]        - CLOSING REMINDERS -    You will receive a call from a Nurse to review instructions and health history.  This assessment must be completed prior to your procedure.  Failure to complete the Nurse assessment may result in the procedure being cancelled.      On the day of your procedure, please designatean adult(s) who can drive you home stay with you for the next 24 hours. The medicines used in the exam will make you sleepy. You will not be able to drive.      You cannot take public transportation, ride share services, or non-medical taxi service without a responsible caregiver.  Medical transport services are allowed with the requirement that a responsible caregiver will receive you at your destination.  We require that drivers and caregivers are confirmed prior to your procedure.      - SCHEDULING DETAILS -  n & n Hospital Setting Required & If yes, what is the exclusion?   Beverly  Surgeon    6/14/23  Date of Procedure  Lower Endoscopy [Colonoscopy]  Type of Procedure Scheduled  [facility]- If you answer yes to questions #8, #20, #21 [  pts ]Which Colonoscopy Prep was Sent?     general Sedation Type     n PAC / Pre-op Required                 "

## 2023-05-16 ENCOUNTER — OFFICE VISIT (OUTPATIENT)
Dept: VASCULAR ULTRASOUND | Facility: CLINIC | Age: 68
End: 2023-05-16
Attending: SPECIALIST
Payer: MEDICARE

## 2023-05-16 VITALS
TEMPERATURE: 97.9 F | HEART RATE: 86 BPM | DIASTOLIC BLOOD PRESSURE: 80 MMHG | RESPIRATION RATE: 18 BRPM | SYSTOLIC BLOOD PRESSURE: 122 MMHG

## 2023-05-16 DIAGNOSIS — I83.893 SYMPTOMATIC VARICOSE VEINS OF BOTH LOWER EXTREMITIES: ICD-10-CM

## 2023-05-16 DIAGNOSIS — I87.2 VENOUS (PERIPHERAL) INSUFFICIENCY: ICD-10-CM

## 2023-05-16 DIAGNOSIS — I80.9 PHLEBITIS: ICD-10-CM

## 2023-05-16 PROCEDURE — 36475 ENDOVENOUS RF 1ST VEIN: CPT | Mod: LT | Performed by: SPECIALIST

## 2023-05-16 PROCEDURE — 250N000011 HC RX IP 250 OP 636: Performed by: SPECIALIST

## 2023-05-16 PROCEDURE — 250N000009 HC RX 250: Performed by: SPECIALIST

## 2023-05-16 PROCEDURE — C1894 INTRO/SHEATH, NON-LASER: HCPCS

## 2023-05-16 PROCEDURE — 258N000003 HC RX IP 258 OP 636: Performed by: SPECIALIST

## 2023-05-16 PROCEDURE — C1886 CATHETER, ABLATION: HCPCS

## 2023-05-16 PROCEDURE — 36476 ENDOVENOUS RF VEIN ADD-ON: CPT | Mod: LT | Performed by: SPECIALIST

## 2023-05-16 RX ORDER — LIDOCAINE HYDROCHLORIDE 10 MG/ML
5 INJECTION, SOLUTION INFILTRATION; PERINEURAL ONCE
Status: COMPLETED | OUTPATIENT
Start: 2023-05-16 | End: 2023-05-16

## 2023-05-16 RX ADMIN — LIDOCAINE HYDROCHLORIDE 5 ML: 10 INJECTION, SOLUTION INFILTRATION; PERINEURAL at 12:24

## 2023-05-16 RX ADMIN — LIDOCAINE HYDROCHLORIDE: 10 INJECTION, SOLUTION INFILTRATION; PERINEURAL at 12:24

## 2023-05-16 ASSESSMENT — PAIN SCALES - GENERAL
PAINLEVEL: NO PAIN (1)
PAINLEVEL: NO PAIN (0)

## 2023-05-16 NOTE — PATIENT INSTRUCTIONS
Discharge Instructions Following Venous Closure  Today, you had this procedure done:   - Vein closure using RadioFrequency Ablation (RFA)    Dressing    Leakage from the numbing medications the first few hours is normal if you had an RFA.     Wear your thigh high compression for 48 hours continuously until your ultrasound after the procedure.  It is ok to remove your stocking to shower in 24 hours but then reapply after.    Wear the thigh high stocking for two weeks after the procedure while you are up. It is ok to take off when you sleep.     After two weeks, you can transition into compression stockings of your choice.     Activity    On the day of the procedure, make sure to walk around the house for a few minutes each hour until bedtime.    The day after the procedure you should advance activity as tolerated.  NO strenuous activities though for 2 weeks.  In general, avoid prolonged standing in place and sitting with your legs down.  Both activities will cause blood to pool in your legs resulting in more swelling and discomfort.    Do not drive or operate heavy machinery for 24 hours after the procedure (excludes if you had a VenaSeal).     Do not take baths or use hot tubs or swimming pools until your incision site is healed.    Do not fly for the next 3 weeks.    Do not lift > 20 lbs. for 2 weeks.     Post Procedure Ultrasound & Follow-up  You will need an ultrasound within 2-3 days following the closure procedure.  Then plan to see your surgeon in the office six weeks after your procedure. Call us to schedule this appointment if one has not already been made.  Post Procedure Signs and Symptoms  There can be a mild amount of bruising and numbness after this procedure.  This will typically take a few weeks to fade.  You may feel pain or tenderness in your inner thigh.  Mild pain medication such as Tylenol or Ibuprofen maybe helpful.  It is normal to have fluid leaking from the injection areas the day of the  procedure and it may be blood tinged.      Call your healthcare provider if you have any of the following:    Fever of 100.4 F (38 C) or higher, or as directed by your provider    Chest pain or trouble breathing    Signs of infection at the catheter insertion site. These include increased redness or swelling (inflammation), warmth, increasing pain, bleeding, or bad-smelling discharge.    Severe numbness or tingling in the treated leg    Severe pain or swelling in the treated leg  For emergencies after 4:30pm Monday - Friday, holidays and weekends:  Dr. Sergey Soto, [facility] at [phone]  Dr. Taurus Wilkinson, [facility] at [phone]  Thank you for allowing us to be part of your care

## 2023-05-17 ENCOUNTER — OFFICE VISIT (OUTPATIENT)
Dept: VASCULAR ULTRASOUND | Facility: CLINIC | Age: 68
End: 2023-05-17
Attending: SPECIALIST
Payer: MEDICARE

## 2023-05-17 VITALS
RESPIRATION RATE: 12 BRPM | DIASTOLIC BLOOD PRESSURE: 76 MMHG | TEMPERATURE: 98.4 F | SYSTOLIC BLOOD PRESSURE: 118 MMHG | HEART RATE: 72 BPM

## 2023-05-17 DIAGNOSIS — I80.9 PHLEBITIS: ICD-10-CM

## 2023-05-17 DIAGNOSIS — I87.2 VENOUS (PERIPHERAL) INSUFFICIENCY: ICD-10-CM

## 2023-05-17 DIAGNOSIS — I83.893 SYMPTOMATIC VARICOSE VEINS OF BOTH LOWER EXTREMITIES: Primary | ICD-10-CM

## 2023-05-17 PROCEDURE — C1886 CATHETER, ABLATION: HCPCS

## 2023-05-17 PROCEDURE — 36475 ENDOVENOUS RF 1ST VEIN: CPT | Mod: RT

## 2023-05-17 PROCEDURE — C1894 INTRO/SHEATH, NON-LASER: HCPCS

## 2023-05-17 PROCEDURE — 36476 ENDOVENOUS RF VEIN ADD-ON: CPT | Mod: RT | Performed by: SPECIALIST

## 2023-05-17 PROCEDURE — 250N000011 HC RX IP 250 OP 636: Performed by: SPECIALIST

## 2023-05-17 PROCEDURE — 36475 ENDOVENOUS RF 1ST VEIN: CPT | Mod: RT | Performed by: SPECIALIST

## 2023-05-17 PROCEDURE — 258N000003 HC RX IP 258 OP 636: Performed by: SPECIALIST

## 2023-05-17 PROCEDURE — 250N000009 HC RX 250: Performed by: SPECIALIST

## 2023-05-17 RX ORDER — LIDOCAINE HYDROCHLORIDE 10 MG/ML
5 INJECTION, SOLUTION INFILTRATION; PERINEURAL ONCE
Status: COMPLETED | OUTPATIENT
Start: 2023-05-17 | End: 2023-05-17

## 2023-05-17 RX ADMIN — LIDOCAINE HYDROCHLORIDE: 10 INJECTION, SOLUTION INFILTRATION; PERINEURAL at 12:19

## 2023-05-17 RX ADMIN — LIDOCAINE HYDROCHLORIDE 5 ML: 10 INJECTION, SOLUTION INFILTRATION; PERINEURAL at 12:19

## 2023-05-17 ASSESSMENT — PAIN SCALES - GENERAL
PAINLEVEL: MILD PAIN (3)
PAINLEVEL: NO PAIN (0)

## 2023-05-17 NOTE — PATIENT INSTRUCTIONS
Discharge Instructions Following Venous Closure  Today, you had this procedure done:   - Vein closure using RadioFrequency Ablation (RFA)    Dressing    Leakage from the numbing medications the first few hours is normal if you had an RFA.     Wear your thigh high compression for 48 hours continuously until your ultrasound after the procedure.  It is ok to remove your stocking to shower in 24 hours but then reapply after.    Wear the thigh high stocking for two weeks after the procedure while you are up. It is ok to take off when you sleep.     After two weeks, you can transition into compression stockings of your choice.     Activity    On the day of the procedure, make sure to walk around the house for a few minutes each hour until bedtime.    The day after the procedure you should advance activity as tolerated.  NO strenuous activities though for 2 weeks.  In general, avoid prolonged standing in place and sitting with your legs down.  Both activities will cause blood to pool in your legs resulting in more swelling and discomfort.    Do not drive or operate heavy machinery for 24 hours after the procedure (excludes if you had a VenaSeal).     Do not take baths or use hot tubs or swimming pools until your incision site is healed.    Do not fly for the next 3 weeks.    Do not lift > 20 lbs. for 2 weeks.     Post Procedure Ultrasound & Follow-up  You will need an ultrasound within 2-3 days following the closure procedure.  Then plan to see your surgeon in the office six weeks after your procedure. Call us to schedule this appointment if one has not already been made.  Post Procedure Signs and Symptoms  There can be a mild amount of bruising and numbness after this procedure.  This will typically take a few weeks to fade.  You may feel pain or tenderness in your inner thigh.  Mild pain medication such as Tylenol or Ibuprofen maybe helpful.  It is normal to have fluid leaking from the injection areas the day of the  procedure and it may be blood tinged.      Call your healthcare provider if you have any of the following:    Fever of 100.4 F (38 C) or higher, or as directed by your provider    Chest pain or trouble breathing    Signs of infection at the catheter insertion site. These include increased redness or swelling (inflammation), warmth, increasing pain, bleeding, or bad-smelling discharge.    Severe numbness or tingling in the treated leg    Severe pain or swelling in the treated leg  For emergencies after 4:30pm Monday - Friday, holidays and weekends:  Dr. Sergey Soto, [facility] at [phone]  Dr. Taurus Wilkinson, [facility] at [phone]  Thank you for allowing us to be part of your care

## 2023-05-19 ENCOUNTER — ANCILLARY PROCEDURE (OUTPATIENT)
Dept: VASCULAR ULTRASOUND | Facility: CLINIC | Age: 68
End: 2023-05-19
Attending: SPECIALIST
Payer: MEDICARE

## 2023-05-19 DIAGNOSIS — I80.9 PHLEBITIS: ICD-10-CM

## 2023-05-19 DIAGNOSIS — I87.2 VENOUS (PERIPHERAL) INSUFFICIENCY: ICD-10-CM

## 2023-05-19 DIAGNOSIS — I83.893 SYMPTOMATIC VARICOSE VEINS OF BOTH LOWER EXTREMITIES: ICD-10-CM

## 2023-05-19 PROCEDURE — 93971 EXTREMITY STUDY: CPT | Mod: LT

## 2023-06-06 RX ORDER — BISACODYL 5 MG/1
TABLET, DELAYED RELEASE ORAL
Qty: 4 TABLET | Refills: 0 | Status: SHIPPED | OUTPATIENT
Start: 2023-06-06 | End: 2023-06-14

## 2023-06-13 ENCOUNTER — ANESTHESIA EVENT (OUTPATIENT)
Dept: GASTROENTEROLOGY | Facility: CLINIC | Age: 68
End: 2023-06-13
Payer: MEDICARE

## 2023-06-13 ASSESSMENT — LIFESTYLE VARIABLES: TOBACCO_USE: 1

## 2023-06-13 NOTE — ANESTHESIA PREPROCEDURE EVALUATION
Anesthesia Pre-Procedure Evaluation    Patient: Ina Otoole   MRN: [id number] : 1955        Procedure : Procedure(s):  Colonoscopy          Past Medical History:   Diagnosis Date     Arthritis 2018     Basal cell carcinoma      Calculus of gallbladder with other cholecystitis, without mention of obstruction 2011     Goiter       Past Surgical History:   Procedure Laterality Date     ABDOMEN SURGERY       ARTHROSCOPY KNEE      left meniscus repair     ARTHROSCOPY KNEE RT/LT      left - meniscus repair     COLONOSCOPY N/A 2017    Procedure: COLONOSCOPY;  Colonoscopy  ;  Surgeon: Abhishek Escobedo MD;  Location: [facility]     COLONOSCOPY       KNEE SURGERY  2013    left knee replacement     LAPAROSCOPIC CHOLECYSTECTOMY  2011    Procedure:LAPAROSCOPIC CHOLECYSTECTOMY; Surgeon:SUMAYA ZAMORANO; Location:[facility]     LAPAROSCOPIC CHOLECYSTECTOMY       REPLACEMENT TOTAL KNEE Right 2018    Procedure: RIGHT TOTAL KNEE ARTHROPLASTY COMPUTER ASSIST;  Surgeon: Kevin Haji MD;  [facility]: [facility] OR;  Service:      STONE ANALYSIS       THYROID BIOPSY       TOTAL KNEE ARTHROPLASTY Left      TUBAL LIGATION  1980     VASCULAR SURGERY        Allergies   Allergen Reactions     Sulfa Antibiotics Hives      Social History     Tobacco Use     Smoking status: Former     Packs/day: 0.00     Years: 0.00     Pack years: 0.00     Types: Cigarettes     Quit date: 2000     Years since quittin.3     Smokeless tobacco: Never   Vaping Use     Vaping status: Never Used   Substance Use Topics     Alcohol use: Yes     Comment: only for special occassions      Wt Readings from Last 1 Encounters:   23 86.6 kg (191 lb)        Anesthesia Evaluation   Pt has had prior anesthetic. Type: General and MAC.        ROS/MED HX  ENT/Pulmonary:     (+) tobacco use, Past use,     Neurologic:       Cardiovascular:       METS/Exercise Tolerance:     Hematologic:       Musculoskeletal:        GI/Hepatic:     (+) GERD,     Renal/Genitourinary:     (+) renal disease,     Endo:     (+) thyroid problem,     Psychiatric/Substance Use:       Infectious Disease:       Malignancy:   (+) Malignancy, History of Skin.    Other:            Physical Exam    Airway  airway exam normal           Respiratory Devices and Support         Dental       (+) Minor Abnormalities - some fillings, tiny chips      Cardiovascular   cardiovascular exam normal          Pulmonary   pulmonary exam normal                OUTSIDE LABS:  CBC:   Lab Results   Component Value Date    WBC 6.4 08/02/2018    WBC 7.1 08/29/2016    HGB 10.5 (L) 08/27/2018    HGB 11.0 (L) 08/22/2018    HCT 42.0 08/02/2018    HCT 43.2 08/29/2016     08/02/2018     08/29/2016     BMP:   Lab Results   Component Value Date     05/11/2023     01/20/2023    POTASSIUM 4.1 05/11/2023    POTASSIUM 4.4 01/20/2023    CHLORIDE 106 05/11/2023    CHLORIDE 107 01/20/2023    CO2 27 05/11/2023    CO2 28 01/20/2023    BUN 11.7 05/11/2023    BUN 13.7 01/20/2023    CR 0.90 05/11/2023    CR 0.83 01/20/2023    GLC 96 05/11/2023    GLC 99 01/20/2023     COAGS:   Lab Results   Component Value Date    INR 0.88 09/05/2013     POC: No results found for: BGM, HCG, HCGS  HEPATIC:   Lab Results   Component Value Date    ALBUMIN 4.5 05/11/2023    PROTTOTAL 6.8 05/11/2023    ALT 24 05/11/2023    AST 21 05/11/2023    ALKPHOS 54 05/11/2023    BILITOTAL 0.8 05/11/2023     OTHER:   Lab Results   Component Value Date    HAYDEN 10.0 05/11/2023    LIPASE 182 06/05/2011    AMYLASE 50 06/05/2011    TSH 1.66 02/23/2011    SED 5 09/08/2009       Anesthesia Plan    ASA Status:  3   NPO Status:  NPO Appropriate    Anesthesia Type: General.      Maintenance: Balanced.        Consents    Anesthesia Plan(s) and associated risks, benefits, and realistic alternatives discussed. Questions answered and patient/representative(s) expressed understanding.    - Discussed:     - Discussed with:   Patient         Postoperative Care            Comments:                DORITA Silva CRNA

## 2023-06-14 ENCOUNTER — HOSPITAL ENCOUNTER (OUTPATIENT)
Facility: CLINIC | Age: 68
Discharge: HOME | End: 2023-06-14
Attending: SURGERY | Admitting: SURGERY
Payer: MEDICARE

## 2023-06-14 ENCOUNTER — ANESTHESIA (OUTPATIENT)
Dept: GASTROENTEROLOGY | Facility: CLINIC | Age: 68
End: 2023-06-14
Payer: MEDICARE

## 2023-06-14 VITALS
HEART RATE: 76 BPM | HEIGHT: 70 IN | SYSTOLIC BLOOD PRESSURE: 115 MMHG | BODY MASS INDEX: 27.35 KG/M2 | TEMPERATURE: 97.5 F | RESPIRATION RATE: 20 BRPM | OXYGEN SATURATION: 99 % | WEIGHT: 191 LBS | DIASTOLIC BLOOD PRESSURE: 78 MMHG

## 2023-06-14 DIAGNOSIS — Z12.11 SPECIAL SCREENING FOR MALIGNANT NEOPLASMS, COLON: Primary | ICD-10-CM

## 2023-06-14 LAB — COLONOSCOPY: NORMAL

## 2023-06-14 PROCEDURE — 258N000003 HC RX IP 258 OP 636: Performed by: SURGERY

## 2023-06-14 PROCEDURE — 250N000009 HC RX 250: Performed by: SURGERY

## 2023-06-14 PROCEDURE — 45385 COLONOSCOPY W/LESION REMOVAL: CPT | Performed by: SURGERY

## 2023-06-14 PROCEDURE — 88305 TISSUE EXAM BY PATHOLOGIST: CPT | Mod: TC | Performed by: SURGERY

## 2023-06-14 PROCEDURE — 45380 COLONOSCOPY AND BIOPSY: CPT | Mod: 59 | Performed by: SURGERY

## 2023-06-14 PROCEDURE — 258N000003 HC RX IP 258 OP 636: Performed by: NURSE ANESTHETIST, CERTIFIED REGISTERED

## 2023-06-14 PROCEDURE — 250N000011 HC RX IP 250 OP 636: Performed by: NURSE ANESTHETIST, CERTIFIED REGISTERED

## 2023-06-14 PROCEDURE — 45380 COLONOSCOPY AND BIOPSY: CPT | Mod: XU

## 2023-06-14 PROCEDURE — 370N000017 HC ANESTHESIA TECHNICAL FEE, PER MIN: Performed by: SURGERY

## 2023-06-14 RX ORDER — PROPOFOL 10 MG/ML
INJECTION, EMULSION INTRAVENOUS CONTINUOUS PRN
Status: DISCONTINUED | OUTPATIENT
Start: 2023-06-14 | End: 2023-06-14

## 2023-06-14 RX ORDER — SODIUM CHLORIDE, SODIUM LACTATE, POTASSIUM CHLORIDE, CALCIUM CHLORIDE 600; 310; 30; 20 MG/100ML; MG/100ML; MG/100ML; MG/100ML
INJECTION, SOLUTION INTRAVENOUS CONTINUOUS
Status: DISCONTINUED | OUTPATIENT
Start: 2023-06-14 | End: 2023-06-14 | Stop reason: HOSPADM

## 2023-06-14 RX ORDER — ONDANSETRON 2 MG/ML
4 INJECTION INTRAMUSCULAR; INTRAVENOUS
Status: DISCONTINUED | OUTPATIENT
Start: 2023-06-14 | End: 2023-06-14 | Stop reason: HOSPADM

## 2023-06-14 RX ORDER — NALOXONE HYDROCHLORIDE 0.4 MG/ML
0.2 INJECTION, SOLUTION INTRAMUSCULAR; INTRAVENOUS; SUBCUTANEOUS
Status: DISCONTINUED | OUTPATIENT
Start: 2023-06-14 | End: 2023-06-14 | Stop reason: HOSPADM

## 2023-06-14 RX ORDER — FLUMAZENIL 0.1 MG/ML
0.2 INJECTION, SOLUTION INTRAVENOUS
Status: DISCONTINUED | OUTPATIENT
Start: 2023-06-14 | End: 2023-06-14 | Stop reason: HOSPADM

## 2023-06-14 RX ORDER — SODIUM CHLORIDE, SODIUM LACTATE, POTASSIUM CHLORIDE, CALCIUM CHLORIDE 600; 310; 30; 20 MG/100ML; MG/100ML; MG/100ML; MG/100ML
INJECTION, SOLUTION INTRAVENOUS CONTINUOUS PRN
Status: DISCONTINUED | OUTPATIENT
Start: 2023-06-14 | End: 2023-06-14

## 2023-06-14 RX ORDER — LIDOCAINE 40 MG/G
CREAM TOPICAL
Status: DISCONTINUED | OUTPATIENT
Start: 2023-06-14 | End: 2023-06-14 | Stop reason: HOSPADM

## 2023-06-14 RX ORDER — NALOXONE HYDROCHLORIDE 0.4 MG/ML
0.4 INJECTION, SOLUTION INTRAMUSCULAR; INTRAVENOUS; SUBCUTANEOUS
Status: DISCONTINUED | OUTPATIENT
Start: 2023-06-14 | End: 2023-06-14 | Stop reason: HOSPADM

## 2023-06-14 RX ORDER — OXYCODONE HYDROCHLORIDE 5 MG/1
10 TABLET ORAL
Status: DISCONTINUED | OUTPATIENT
Start: 2023-06-14 | End: 2023-06-14 | Stop reason: HOSPADM

## 2023-06-14 RX ORDER — ONDANSETRON 2 MG/ML
4 INJECTION INTRAMUSCULAR; INTRAVENOUS EVERY 30 MIN PRN
Status: DISCONTINUED | OUTPATIENT
Start: 2023-06-14 | End: 2023-06-14 | Stop reason: HOSPADM

## 2023-06-14 RX ORDER — OXYCODONE HYDROCHLORIDE 5 MG/1
5 TABLET ORAL
Status: DISCONTINUED | OUTPATIENT
Start: 2023-06-14 | End: 2023-06-14 | Stop reason: HOSPADM

## 2023-06-14 RX ORDER — ONDANSETRON 4 MG/1
4 TABLET, ORALLY DISINTEGRATING ORAL EVERY 30 MIN PRN
Status: DISCONTINUED | OUTPATIENT
Start: 2023-06-14 | End: 2023-06-14 | Stop reason: HOSPADM

## 2023-06-14 RX ADMIN — SODIUM CHLORIDE, POTASSIUM CHLORIDE, SODIUM LACTATE AND CALCIUM CHLORIDE: 600; 310; 30; 20 INJECTION, SOLUTION INTRAVENOUS at 07:38

## 2023-06-14 RX ADMIN — PROPOFOL 200 MCG/KG/MIN: 10 INJECTION, EMULSION INTRAVENOUS at 07:40

## 2023-06-14 RX ADMIN — LIDOCAINE HYDROCHLORIDE 0.1 ML: 10 INJECTION, SOLUTION EPIDURAL; INFILTRATION; INTRACAUDAL; PERINEURAL at 06:57

## 2023-06-14 RX ADMIN — SODIUM CHLORIDE, POTASSIUM CHLORIDE, SODIUM LACTATE AND CALCIUM CHLORIDE 1000 ML: 600; 310; 30; 20 INJECTION, SOLUTION INTRAVENOUS at 06:57

## 2023-06-14 ASSESSMENT — ACTIVITIES OF DAILY LIVING (ADL): ADLS_ACUITY_SCORE: 35

## 2023-06-14 NOTE — ANESTHESIA CARE TRANSFER NOTE
Patient: Ina Otoole    Procedure: Procedure(s):  COLONOSCOPY, FLEXIBLE, WITH LESION REMOVAL USING SNARE       Diagnosis: Diarrhea, unspecified type [R19.7]  Diagnosis Additional Information: No value filed.    Anesthesia Type:   General     Note:    Oropharynx: oropharynx clear of all foreign objects and spontaneously breathing  Level of Consciousness: awake  Oxygen Supplementation: room air    Independent Airway: airway patency satisfactory and stable  Dentition: dentition unchanged  Vital Signs Stable: post-procedure vital signs reviewed and stable  Report to RN Given: handoff report given  Patient transferred to: Phase II    Handoff Report: Identifed the Patient, Identified the Reponsible Provider, Reviewed the pertinent medical history, Discussed the surgical course, Reviewed Intra-OP anesthesia mangement and issues during anesthesia, Set expectations for post-procedure period and Allowed opportunity for questions and acknowledgement of understanding      Vitals:  Vitals Value Taken Time   BP     Temp     Pulse     Resp     SpO2         Electronically Signed By: Ashish Quinones CRNA, DORITA SHEPPARD  June 14, 2023  8:06 AM

## 2023-06-14 NOTE — PROGRESS NOTES
[facility] DISCHARGE NOTE    Patient discharged to home at 8:34 AM via ambulation. Accompanied by sister and staff. Discharge instructions reviewed with patient and sibling, opportunity offered to ask questions. Prescriptions - None ordered for discharge. All belongings sent with patient.    Supriya Malave RN

## 2023-06-14 NOTE — ANESTHESIA POSTPROCEDURE EVALUATION
Patient: Ina Otoole    Procedure: Procedure(s):  COLONOSCOPY, FLEXIBLE, WITH LESION REMOVAL USING SNARE       Anesthesia Type:  General    Note:  Disposition: Outpatient   Postop Pain Control: Uneventful            Sign Out: Well controlled pain   PONV: No   Neuro/Psych: Uneventful            Sign Out: Acceptable/Baseline neuro status   Airway/Respiratory: Uneventful            Sign Out: Acceptable/Baseline resp. status   CV/Hemodynamics: Uneventful            Sign Out: Acceptable CV status; No obvious hypovolemia; No obvious fluid overload   Other NRE: NONE   DID A NON-ROUTINE EVENT OCCUR? No           Last vitals:  Vitals:    06/14/23 0629   BP: (!) 146/88   Pulse: 91   Temp: 36.9  C (98.5  F)   SpO2: 100%       Electronically Signed By: Ashish Quinones CRNA, DORITA SHEPPARD  June 14, 2023  8:06 AM

## 2023-06-14 NOTE — H&P
68 year old year old female here for colonoscopy for diarrhea.  Last colonoscopy was 2017.  Ongoing, history of bowel issues.  No antibiotics.  Stool studies negative. Patient denies blood in stool or change in stool caliber.   Patient's father had colon cancer in late 60's.    Patient Active Problem List   Diagnosis     Esophageal reflux     Myalgia and myositis     Goiter     Status post total left knee replacement     CARDIOVASCULAR SCREENING; LDL GOAL LESS THAN 160     Renal calculus or stone     IBS (irritable bowel syndrome)     Advanced directives, counseling/discussion     Thrombophlebitis leg     Vulvar ulcer       Past Medical History:   Diagnosis Date     Arthritis 2018     Basal cell carcinoma      Calculus of gallbladder with other cholecystitis, without mention of obstruction 06/07/2011     Goiter        Past Surgical History:   Procedure Laterality Date     ABDOMEN SURGERY       ARTHROSCOPY KNEE      left meniscus repair     ARTHROSCOPY KNEE RT/LT      left - meniscus repair     COLONOSCOPY N/A 08/24/2017    Procedure: COLONOSCOPY;  Colonoscopy  ;  Surgeon: Abhishek Escobedo MD;  Location: [facility]     COLONOSCOPY       KNEE SURGERY  09/2013    left knee replacement     LAPAROSCOPIC CHOLECYSTECTOMY  06/29/2011    Procedure:LAPAROSCOPIC CHOLECYSTECTOMY; Surgeon:SUMAYA ZAMORANO; Location:[facility]     LAPAROSCOPIC CHOLECYSTECTOMY       REPLACEMENT TOTAL KNEE Right 08/20/2018    Procedure: RIGHT TOTAL KNEE ARTHROPLASTY COMPUTER ASSIST;  Surgeon: Kevin Haji MD;  Location: [facility];  Service:      STONE ANALYSIS  2011     THYROID BIOPSY       TOTAL KNEE ARTHROPLASTY Left      TUBAL LIGATION  1980     VASCULAR SURGERY  2018       Family History   Problem Relation Age of Onset     Diabetes Mother      Hypertension Mother      Cerebrovascular Disease Mother      Cancer Mother         melanoma     Dementia Mother      Basal cell carcinoma Mother      Squamous cell carcinoma Mother       "Respiratory Father         copd     Cancer - colorectal Father      Bronchitis Father      Hypertension Sister      Multiple Sclerosis Sister      Neurologic Disorder Sister         ms     Diabetes Brother      Hypertension Brother      Breast Cancer Maternal Grandmother      Diabetes Paternal Grandmother      Other - See Comments Daughter         Transverse Myelitis     Asthma No family hx of      Prostate Cancer No family hx of        Current Outpatient Rx   Medication Sig Dispense Refill     bisacodyl (DULCOLAX) 5 MG EC tablet Take 2 tablets at 3 pm the day before your procedure. If your procedure is before 11 am, take 2 additional tablets at 11 pm. If your procedure is after 11 am, take 2 additional tablets at 6 am. For additional instructions refer to your colonoscopy prep instructions. 4 tablet 0     polyethylene glycol (GOLYTELY) 236 g suspension The night before the exam at 6 pm drink an 8-ounce glass every 15 minutes until the jug is half empty. If you arrive before 11 AM: Drink the other half of the Golytely jug at 11 PM night before procedure. If you arrive after 11 AM: Drink the other half of the Golytely jug at 6 AM day of procedure. For additional instructions refer to your colonoscopy prep instructions. 4000 mL 0       Allergies   Allergen Reactions     Sulfa Antibiotics Hives       Pt reports that she quit smoking about 23 years ago. Her smoking use included cigarettes. She has never used smokeless tobacco. She reports current alcohol use. She reports that she does not use drugs.    Exam:  BP (!) 146/88 (BP Location: Left arm)   Pulse 91   Temp 98.5  F (36.9  C) (Oral)   Ht 1.765 m (5' 9.5\")   Wt 86.6 kg (191 lb)   SpO2 100%   BMI 27.80 kg/m      Awake, Alert OX3  Lungs - CTA bilaterally  CV - RRR, no murmurs, distal pulses intact  Abd - soft, non-distended, non-tender, +BS  Extr - No cyanosis or edema    A/P 68 year old year old female in need of colonoscopy for diarrhea. Risks, benefits, " alternatives, and complications were discussed including the possibility of perforation, bleeding, missed lesion and the patient agreed to proceed    Conner Paul DO on 6/14/2023 at 7:34 AM

## 2023-06-14 NOTE — LETTER
Ina Otoole  [address] [facility] [id number] June 20, 2023    Dear Ina,  This letter is written to inform you of the results of your recent colonoscopy.  Your examination showed polyp(s) in your transverse colon. The polyp was not retrieved (due to too much food residue in your colon.  Your examination was otherwise without abnormality.      Final Diagnosis   A. Random colon, biopsies:  - Within normal limits     B. Ascending, transverse colon, polypectomy:  - Within normal limits  - Abundant vegetable matter     Given these findings and your family history of colon cancer, I recommend that you undergo a repeat colonoscopy in 5 years for screening. We will enter you into a recall system so you receive a reminder closer to the time that you are due for repeat examination.     Please remember that this recommendation is made with the understanding that you are not experiencing persistent changes in bowel function, bleeding per rectum, and/or significant abdominal pain. If you experience these symptoms, please contact your primary care provider for a further evaluation.     If you have any questions or concerns about the results of your colonoscopy or the appropriate follow-up, please contact my assistant at [phone]    Sincerely,      Conner Paul,    [facility]  ___

## 2023-06-15 LAB
PATH REPORT.COMMENTS IMP SPEC: NORMAL
PATH REPORT.COMMENTS IMP SPEC: NORMAL
PATH REPORT.FINAL DX SPEC: NORMAL
PATH REPORT.GROSS SPEC: NORMAL
PATH REPORT.MICROSCOPIC SPEC OTHER STN: NORMAL
PATH REPORT.RELEVANT HX SPEC: NORMAL
PHOTO IMAGE: NORMAL

## 2023-06-15 PROCEDURE — 88305 TISSUE EXAM BY PATHOLOGIST: CPT | Mod: 26 | Performed by: PATHOLOGY

## 2023-06-27 ENCOUNTER — OFFICE VISIT (OUTPATIENT)
Dept: VASCULAR SURGERY | Facility: CLINIC | Age: 68
End: 2023-06-27
Attending: SPECIALIST
Payer: MEDICARE

## 2023-06-27 VITALS — HEART RATE: 90 BPM | TEMPERATURE: 98.4 F | SYSTOLIC BLOOD PRESSURE: 132 MMHG | DIASTOLIC BLOOD PRESSURE: 80 MMHG

## 2023-06-27 DIAGNOSIS — I83.893 SYMPTOMATIC VARICOSE VEINS OF BOTH LOWER EXTREMITIES: Primary | ICD-10-CM

## 2023-06-27 DIAGNOSIS — I87.2 VENOUS (PERIPHERAL) INSUFFICIENCY: ICD-10-CM

## 2023-06-27 PROCEDURE — 99212 OFFICE O/P EST SF 10 MIN: CPT | Performed by: SPECIALIST

## 2023-06-27 PROCEDURE — G0463 HOSPITAL OUTPT CLINIC VISIT: HCPCS | Performed by: SPECIALIST

## 2023-06-27 NOTE — PROGRESS NOTES
Post Procedure Note Endovenous Closure    S: Ina Otoole is a 68 year old female S/P Bilateral  greater saphenous vein leg endovenous closure ofBilateral  acessory saphenous veinlower ext. 6 weeks out from procedure. Doing well, wore female  thigh high socks. Minimal discomfort. Some superficial phlibitis. Which is resolving.   She had some pain here few weeks ago pries some phlebitis also little numbness on the anterior right shin most all these issues have all now resolved.  She has minimal numbness but this is improved and her pain is greatly improved.  O:   Vitals:    06/27/23 0943   BP: 132/80   Pulse: 90   Temp: 98.4  F (36.9  C)       Status: doing well, superficial phlebitis, having discomfort and superficial phlebitis which is resolving    General: no apparent distress  Legs look good no signs of infection, incisions healing nicely.      Side:: Bilateral  VCSS  PAIN:: Mild: Occasional, not restricting activity of requiring pain medication  Varicose Veins:: Moderate: Multiple: great saphenous confined to calf and thigh  Venous Edema:: Absent: None  Skin Pigmentation:: Absent: None  Inflamation:: Absent: None  Induration:: Absent: None  Number of active ulcers:: 0  Active ulcer duration:: None  Active ulcer diameter:: None  Compression Therapy:: Full compliance stockings + elevation  VCSS Score:: 6  CEAP:: Simple varicose veins only    Imaging:    Exam Information    Exam Date Exam Time Accession # Performing Department Results    5/19/23 10:51 AM [id number] [facility] [place] [place]      PACS Images     Show images for US Venous Post Ablation Legs Bilateral     Study Result    Narrative & Impression   Bilateral Venous Ultrasound Status Post Radiofrequency Ablation (Date: 05/19/23)        Indication: Follow-up saphenous vein Radiofrequency ablation.  Lower extremity pain/swelling     Date of Procedure: Right 05/17/23   Left 05/16/23      Right CFV/SFJ Compression:   Fully Compressible     Left CFV/SFJ Compression:  Fully Compressible     Reference:   (FC)-Fully Compressible           (PC)-Partially Compressible   (NC) Non-Compressible     Location Right GSV Right AASV   Proximal Thigh NC NC   Mid Thigh NC NC   Distal Thigh NC FC   Knee NC     Proximal Calf NC     Mid Calf NC     Distal Calf FC           Location Left GSV Left AASV   Proximal Thigh NC NC   Mid Thigh NC NC   Distal Thigh NC FC   Knee NC     Proximal Calf FC        Impression:   1.  Postoperative occlusion of the right greater saphenous vein from the proximal thigh to the mid calf.  Occlusion of the right anterior sensory saphenous vein from the proximal thigh to the mid thigh.  Patent saphenofemoral junction.  2.  Postoperative occlusion of the left greater saphenous vein from the proximal thigh to the knee.  Occlusion of the left anterior sensory saphenous vein from the proximal thigh to the mid thigh.  Patent saphenofemoral junction.            A/P: S/p endovenous closure. For insuffiencey of veins     May switch to knee high support socks   Resume all activities   RTC 6 months   Call for any questions or concerns     Sergey Soto MD   [facility]

## 2023-06-27 NOTE — PROGRESS NOTES
[facility]        Patient is here for a follow up rihc RFA GSV/AASV 5/16 and 5/17/23.  Patient taking aspirin     Patient is experiencing mild amount of pain in legs   Patient has been wearing compression socks             Amy Fernandez MA

## 2023-06-27 NOTE — PATIENT INSTRUCTIONS
"We would like to see you back in 6 months for a follow up. You can resume your normal activities. It is important to remember that venous reflux disease is a life-long disease. You should wear compression as much as you can. It is especially important to wear compression with long periods of sitting, standing, long car rides or if you will be flying. Compression socks should get refilled every 4-6 months. If you do a lot of standing it is good to do calf raises to help keep the blood pumping. If you sit a lot at work, it is good to get up periodically to walk around. Elevation of the foot of your bed 4-6\" helps the blood return back to where it is needed. They do not need to be worn at night while in bed. We can refill your compression prescription for 1 year otherwise your primary is able to refill them for you.    Call us with any problems or questions: [phone]  Thank you for entrusting us with your care.    "

## 2023-07-10 ENCOUNTER — VIRTUAL VISIT (OUTPATIENT)
Dept: FAMILY MEDICINE | Facility: CLINIC | Age: 68
End: 2023-07-10
Payer: MEDICARE

## 2023-07-10 DIAGNOSIS — K52.9 CHRONIC DIARRHEA: Primary | ICD-10-CM

## 2023-07-10 PROCEDURE — 99213 OFFICE O/P EST LOW 20 MIN: CPT | Mod: 95 | Performed by: FAMILY MEDICINE

## 2023-07-10 ASSESSMENT — ENCOUNTER SYMPTOMS
ENDOCRINE NEGATIVE: 1
DIARRHEA: 1
PSYCHIATRIC NEGATIVE: 1
CARDIOVASCULAR NEGATIVE: 1
EYES NEGATIVE: 1
HEMATOLOGIC/LYMPHATIC NEGATIVE: 1
NEUROLOGICAL NEGATIVE: 1
ALLERGIC/IMMUNOLOGIC NEGATIVE: 1
RESPIRATORY NEGATIVE: 1
CONSTITUTIONAL NEGATIVE: 1
MUSCULOSKELETAL NEGATIVE: 1

## 2023-07-10 NOTE — PROGRESS NOTES
"Kirti is a 68 year old who is being evaluated via a billable telephone visit.      What phone number would you like to be contacted at? [phone]  How would you like to obtain your AVS? Cesia    Distant Location (provider location):  On-site    Assessment & Plan     (K52.9) Chronic diarrhea  (primary encounter diagnosis)  Comment: Patient has been dealing with diarrhea for a long time. She has had a colonoscopy which was normal. She is still having severe symptoms.Referral placed to GI.  Plan: Adult GI  Referral - Consult Only            BMI:   Estimated body mass index is 27.8 kg/m  as calculated from the following:    Height as of 6/14/23: 1.765 m (5' 9.5\").    Weight as of 6/14/23: 86.6 kg (191 lb).       FUTURE APPOINTMENTS:       - Follow-up visit as needed.    Verito Ashby MD  [facility]    Keke Cotto is a 68 year old, presenting for the following health issues:  Follow Up (Diarrhea and Colonoscopy 6/14/23-still having diarrhea and painful stools with bloating)        7/10/2023    10:26 AM   Additional Questions   Roomed by Loida BRITO   Accompanied by self     History of Present Illness       Reason for visit:  Did my colonoscopy shed any light on my bowel problems?  One of the findings was \"congested mucosa in entire colon\" and the other \"significant looping made test difficult\"    She eats 2-3 servings of fruits and vegetables daily.She consumes 0 sweetened beverage(s) daily.She exercises with enough effort to increase her heart rate 20 to 29 minutes per day.  She exercises with enough effort to increase her heart rate 4 days per week.   She is taking medications regularly.             Review of Systems   Constitutional: Negative.    HENT: Negative.     Eyes: Negative.    Respiratory: Negative.     Cardiovascular: Negative.    Gastrointestinal:  Positive for diarrhea.   Endocrine: Negative.    Breasts:  negative.    Genitourinary: Negative.  "   Musculoskeletal: Negative.    Allergic/Immunologic: Negative.    Neurological: Negative.    Hematological: Negative.    Psychiatric/Behavioral: Negative.              Objective           Vitals:  No vitals were obtained today due to virtual visit.    Physical Exam   healthy, alert, and no distress  PSYCH: Alert and oriented times 3; coherent speech, normal   rate and volume, able to articulate logical thoughts, able   to abstract reason, no tangential thoughts, no hallucinations   or delusions  Her affect is normal  RESP: No cough, no audible wheezing, able to talk in full sentences  Remainder of exam unable to be completed due to telephone visits              Phone call duration: 7 minutes

## 2023-07-18 ENCOUNTER — OFFICE VISIT (OUTPATIENT)
Dept: DERMATOLOGY | Facility: CLINIC | Age: 68
End: 2023-07-18
Payer: MEDICARE

## 2023-07-18 ENCOUNTER — ALLIED HEALTH/NURSE VISIT (OUTPATIENT)
Dept: FAMILY MEDICINE | Facility: CLINIC | Age: 68
End: 2023-07-18
Payer: MEDICARE

## 2023-07-18 DIAGNOSIS — D22.9 MULTIPLE BENIGN NEVI: ICD-10-CM

## 2023-07-18 DIAGNOSIS — L82.1 SEBORRHEIC KERATOSIS: ICD-10-CM

## 2023-07-18 DIAGNOSIS — Z23 NEED FOR SHINGLES VACCINE: ICD-10-CM

## 2023-07-18 DIAGNOSIS — Z23 ENCOUNTER FOR IMMUNIZATION: Primary | ICD-10-CM

## 2023-07-18 DIAGNOSIS — D18.01 CHERRY ANGIOMA: ICD-10-CM

## 2023-07-18 DIAGNOSIS — Z85.828 HISTORY OF BASAL CELL CANCER: ICD-10-CM

## 2023-07-18 DIAGNOSIS — L81.4 LENTIGO: ICD-10-CM

## 2023-07-18 DIAGNOSIS — L90.0 LICHEN SCLEROSUS: Primary | ICD-10-CM

## 2023-07-18 PROCEDURE — 90750 HZV VACC RECOMBINANT IM: CPT

## 2023-07-18 PROCEDURE — 90471 IMMUNIZATION ADMIN: CPT

## 2023-07-18 PROCEDURE — 99207 PR NO CHARGE NURSE ONLY: CPT

## 2023-07-18 PROCEDURE — 99213 OFFICE O/P EST LOW 20 MIN: CPT | Performed by: PHYSICIAN ASSISTANT

## 2023-07-18 ASSESSMENT — PAIN SCALES - GENERAL: PAINLEVEL: NO PAIN (0)

## 2023-07-18 NOTE — LETTER
7/18/2023         RE: Ina Otoole  [address] [facility] [address]        Dear Colleague,    Thank you for referring your patient, Ina Otoole, to the [facility]. Please see a copy of my visit note below.    Ina Otoole is an extremely pleasant 67 year old year old female patient here today for recheck lichen sclerosus and check check. She notes lichen sclerosus is well controlled with clobetasol twice weekly. She denies any changing nevi or skin lesions..  Remainder of the HPI, Meds, PMH, Allergies, FH, and SH was reviewed in chart.    History of BCC  Past Medical History:   Diagnosis Date     Arthritis 2018     Basal cell carcinoma      Calculus of gallbladder with other cholecystitis, without mention of obstruction 06/07/2011     Goiter        Past Surgical History:   Procedure Laterality Date     ABDOMEN SURGERY       ARTHROSCOPY KNEE      left meniscus repair     ARTHROSCOPY KNEE RT/LT      left - meniscus repair     COLONOSCOPY N/A 08/24/2017    Procedure: COLONOSCOPY;  Colonoscopy  ;  Surgeon: Abhishek Escobedo MD;  Location: [facility]     COLONOSCOPY       COLONOSCOPY N/A 6/14/2023    Procedure: COLONOSCOPY, FLEXIBLE, WITH LESION REMOVAL USING SNARE;  Surgeon: Conner Paul DO;  Location: [facility]     KNEE SURGERY  09/2013    left knee replacement     LAPAROSCOPIC CHOLECYSTECTOMY  06/29/2011    Procedure:LAPAROSCOPIC CHOLECYSTECTOMY; Surgeon:SUMAYA ZAMORANO; Location:[facility]     LAPAROSCOPIC CHOLECYSTECTOMY       REPLACEMENT TOTAL KNEE Right 08/20/2018    Procedure: RIGHT TOTAL KNEE ARTHROPLASTY COMPUTER ASSIST;  Surgeon: Kevin Haji MD;  Location: [facility] OR;  Service:      STONE ANALYSIS  2011     THYROID BIOPSY       TOTAL KNEE ARTHROPLASTY Left      TUBAL LIGATION  1980     VASCULAR SURGERY  2018        Family History   Problem Relation Age of Onset     Diabetes Mother      Hypertension Mother      Cerebrovascular Disease Mother       Cancer Mother         melanoma     Dementia Mother      Basal cell carcinoma Mother      Squamous cell carcinoma Mother      Respiratory Father         copd     Cancer - colorectal Father      Bronchitis Father      Hypertension Sister      Multiple Sclerosis Sister      Neurologic Disorder Sister         ms     Diabetes Brother      Hypertension Brother      Breast Cancer Maternal Grandmother      Diabetes Paternal Grandmother      Other - See Comments Daughter         Transverse Myelitis     Asthma No family hx of      Prostate Cancer No family hx of        Social History     Socioeconomic History     Marital status:      Spouse name: Not on file     Number of children: Not on file     Years of education: Not on file     Highest education level: Not on file   Occupational History     Not on file   Tobacco Use     Smoking status: Former     Packs/day: 0.00     Years: 0.00     Pack years: 0.00     Types: Cigarettes     Quit date: 2000     Years since quittin.4     Smokeless tobacco: Never   Vaping Use     Vaping Use: Never used   Substance and Sexual Activity     Alcohol use: Yes     Comment: only for special occassions     Drug use: No     Sexual activity: Never   Other Topics Concern     Parent/sibling w/ CABG, MI or angioplasty before 65F 55M? No   Social History Narrative    Working at [employer]  [employer], [employer]    .      2 children    2 grandchild     Social Determinants of Health     Financial Resource Strain: Not on file   Food Insecurity: Not on file   Transportation Needs: Not on file   Physical Activity: Not on file   Stress: Not on file   Social Connections: Not on file   Intimate Partner Violence: Not on file   Housing Stability: Not on file       Outpatient Encounter Medications as of 2023   Medication Sig Dispense Refill     aspirin (ASA) 325 MG tablet Take 1 tablet (325 mg) by mouth daily 90 tablet 0     Cholecalciferol (VITAMIN D3 PO) Take 25 mcg by mouth  daily       clobetasol (TEMOVATE) 0.05 % external ointment Apply topically 2 times daily 30 g 5     famotidine (PEPCID) 20 MG tablet Take 1 tablet (20 mg) by mouth 2 times daily 30 tablet 6     Ibuprofen (ADVIL PO) Take 400 mg by mouth 2 times daily       multivitamin (ONE-DAILY) tablet Take 1 tablet by mouth daily Also has Vitamin D 25 mcg in the vitamin.       senna-docusate (SENOKOT-S;PERICOLACE) 8.6-50 MG per tablet Take 2 tablets by mouth 2 times daily        No facility-administered encounter medications on file as of 7/18/2023.             O:   NAD, WDWN, Alert & Oriented, Mood & Affect wnl, Vitals stable   Here today alone   There were no vitals taken for this visit.   General appearance normal   Vitals stable   Alert, oriented and in no acute distress     Labia mucosa improved, mild atrophy  Brown stuck on papules and brown macules on torso and extremities  Brown papule and macules with regular pigment network and borders on torso and extremities  Red papules on torso     Eyes: Conjunctivae/lids:Normal     ENT: Lips: normal    MSK:Normal    Pulm: Breathing Normal    Neuro/Psych: Orientation:Alert and Orientedx3 ; Mood/Affect:normal  A/P:  1. Early lichen sclerosus   Well controlled on clobetasol, continue  2-3 times weekly.   Use aquaphor or vaseline daily.   Use mild cleanser like cerave, cetaphil, or dove sensitive skin cleanser.     2. Seborrheic keratosis, lentigo, cherry angioma, benign nevi, history of BCC  BENIGN LESIONS DISCUSSED WITH PATIENT:  I discussed the specifics of tumor, prognosis, and genetics of benign lesions.  I explained that treatment of these lesions would be purely cosmetic and not medically neccessary.  I discussed with patient different removal options including excision, cautery and /or laser.      Nature and genetics of benign skin lesions dicussed with patient.  Signs and Symptoms of skin cancer discussed with patient.  ABCDEs of melanoma reviewed with patient.  Patient  encouraged to perform monthly skin exams.  UV precautions reviewed with patient.  Risks of non-melanoma skin cancer discussed with patient   Return to clinic in one year or sooner if needed.       Again, thank you for allowing me to participate in the care of your patient.        Sincerely,        Tiffany Reid PA-C

## 2023-07-18 NOTE — NURSING NOTE
Chief Complaint   Patient presents with     Skin Check     No Concerns       There were no vitals filed for this visit.  Wt Readings from Last 1 Encounters:   06/14/23 86.6 kg (191 lb)       Celi Thakur LPN .................7/18/2023

## 2023-07-18 NOTE — NURSING NOTE
Discussed the insurance coverage of the singles vaccine with the patient, clinic vs. Pharmacy and that medicare does not cover the shingles vaccines.  She declined to check with the pharmacy and gave ok to give in the clinic.  SHANELL Smalls MA

## 2023-07-18 NOTE — PROGRESS NOTES
Prior to immunization administration, verified patients identity using patient s name and date of birth. Please see Immunization Activity for additional information.     Screening Questionnaire for Adult Immunization    Are you sick today?   No   Do you have allergies to medications, food, a vaccine component or latex?   Yes   Have you ever had a serious reaction after receiving a vaccination?   No   Do you have a long-term health problem with heart, lung, kidney, or metabolic disease (e.g., diabetes), asthma, a blood disorder, no spleen, complement component deficiency, a cochlear implant, or a spinal fluid leak?  Are you on long-term aspirin therapy?   No   Do you have cancer, leukemia, HIV/AIDS, or any other immune system problem?   No   Do you have a parent, brother, or sister with an immune system problem?   No   In the past 3 months, have you taken medications that affect  your immune system, such as prednisone, other steroids, or anticancer drugs; drugs for the treatment of rheumatoid arthritis, Crohn s disease, or psoriasis; or have you had radiation treatments?   No   Have you had a seizure, or a brain or other nervous system problem?   No   During the past year, have you received a transfusion of blood or blood    products, or been given immune (gamma) globulin or antiviral drug?   No   For women: Are you pregnant or is there a chance you could become       pregnant during the next month?   No   Have you received any vaccinations in the past 4 weeks?   No     Immunization questionnaire was positive for at least one answer.  Not contraindicated to vaccine given.    I have reviewed the following standing orders:   This patient is due and qualifies for the Zoster vaccine.    Click here for Zoster Standing Order    I have reviewed the vaccines inclusion and exclusion criteria; No concerns regarding eligibility.     Patient instructed to remain in clinic for 15 minutes afterwards, and to report any adverse  reactions.     Screening performed by Makenzie Smalls CMA on 7/18/2023 at 2:03 PM.

## 2023-07-19 NOTE — PROGRESS NOTES
Ina Otoole is an extremely pleasant 67 year old year old female patient here today for recheck lichen sclerosus and check check. She notes lichen sclerosus is well controlled with clobetasol twice weekly. She denies any changing nevi or skin lesions..  Remainder of the HPI, Meds, PMH, Allergies, FH, and SH was reviewed in chart.    History of BCC  Past Medical History:   Diagnosis Date    Arthritis 2018    Basal cell carcinoma     Calculus of gallbladder with other cholecystitis, without mention of obstruction 06/07/2011    Goiter        Past Surgical History:   Procedure Laterality Date    ABDOMEN SURGERY      ARTHROSCOPY KNEE      left meniscus repair    ARTHROSCOPY KNEE RT/LT      left - meniscus repair    COLONOSCOPY N/A 08/24/2017    Procedure: COLONOSCOPY;  Colonoscopy  ;  Surgeon: Abhishek Escobedo MD;  Location: [facility]    COLONOSCOPY      COLONOSCOPY N/A 6/14/2023    Procedure: COLONOSCOPY, FLEXIBLE, WITH LESION REMOVAL USING SNARE;  Surgeon: Conner Paul DO;  Location: [facility]    KNEE SURGERY  09/2013    left knee replacement    LAPAROSCOPIC CHOLECYSTECTOMY  06/29/2011    Procedure:LAPAROSCOPIC CHOLECYSTECTOMY; Surgeon:SUMAYA ZAMORANO; Location:[facility]    LAPAROSCOPIC CHOLECYSTECTOMY      REPLACEMENT TOTAL KNEE Right 08/20/2018    Procedure: RIGHT TOTAL KNEE ARTHROPLASTY COMPUTER ASSIST;  Surgeon: Kevin Haji MD;  Location: [facility] OR;  Service:     STONE ANALYSIS  2011    THYROID BIOPSY      TOTAL KNEE ARTHROPLASTY Left     TUBAL LIGATION  1980    VASCULAR SURGERY  2018        Family History   Problem Relation Age of Onset    Diabetes Mother     Hypertension Mother     Cerebrovascular Disease Mother     Cancer Mother         melanoma    Dementia Mother     Basal cell carcinoma Mother     Squamous cell carcinoma Mother     Respiratory Father         copd    Cancer - colorectal Father     Bronchitis Father     Hypertension Sister     Multiple Sclerosis Sister      Neurologic Disorder Sister         ms    Diabetes Brother     Hypertension Brother     Breast Cancer Maternal Grandmother     Diabetes Paternal Grandmother     Other - See Comments Daughter         Transverse Myelitis    Asthma No family hx of     Prostate Cancer No family hx of        Social History     Socioeconomic History    Marital status:      Spouse name: Not on file    Number of children: Not on file    Years of education: Not on file    Highest education level: Not on file   Occupational History    Not on file   Tobacco Use    Smoking status: Former     Packs/day: 0.00     Years: 0.00     Pack years: 0.00     Types: Cigarettes     Quit date: 2000     Years since quittin.4    Smokeless tobacco: Never   Vaping Use    Vaping Use: Never used   Substance and Sexual Activity    Alcohol use: Yes     Comment: only for special occassions    Drug use: No    Sexual activity: Never   Other Topics Concern    Parent/sibling w/ CABG, MI or angioplasty before 65F 55M? No   Social History Narrative    Working at [employer]  [employer], [employer]    .      2 children    2 grandchild     Social Determinants of Health     Financial Resource Strain: Not on file   Food Insecurity: Not on file   Transportation Needs: Not on file   Physical Activity: Not on file   Stress: Not on file   Social Connections: Not on file   Intimate Partner Violence: Not on file   Housing Stability: Not on file       Outpatient Encounter Medications as of 2023   Medication Sig Dispense Refill    aspirin (ASA) 325 MG tablet Take 1 tablet (325 mg) by mouth daily 90 tablet 0    Cholecalciferol (VITAMIN D3 PO) Take 25 mcg by mouth daily      clobetasol (TEMOVATE) 0.05 % external ointment Apply topically 2 times daily 30 g 5    famotidine (PEPCID) 20 MG tablet Take 1 tablet (20 mg) by mouth 2 times daily 30 tablet 6    Ibuprofen (ADVIL PO) Take 400 mg by mouth 2 times daily      multivitamin (ONE-DAILY) tablet Take 1 tablet by  mouth daily Also has Vitamin D 25 mcg in the vitamin.      senna-docusate (SENOKOT-S;PERICOLACE) 8.6-50 MG per tablet Take 2 tablets by mouth 2 times daily        No facility-administered encounter medications on file as of 7/18/2023.             O:   NAD, WDWN, Alert & Oriented, Mood & Affect wnl, Vitals stable   Here today alone   There were no vitals taken for this visit.   General appearance normal   Vitals stable   Alert, oriented and in no acute distress     Labia mucosa improved, mild atrophy  Brown stuck on papules and brown macules on torso and extremities  Brown papule and macules with regular pigment network and borders on torso and extremities  Red papules on torso     Eyes: Conjunctivae/lids:Normal     ENT: Lips: normal    MSK:Normal    Pulm: Breathing Normal    Neuro/Psych: Orientation:Alert and Orientedx3 ; Mood/Affect:normal  A/P:  1. Early lichen sclerosus   Well controlled on clobetasol, continue  2-3 times weekly.   Use aquaphor or vaseline daily.   Use mild cleanser like cerave, cetaphil, or dove sensitive skin cleanser.     2. Seborrheic keratosis, lentigo, cherry angioma, benign nevi, history of BCC  BENIGN LESIONS DISCUSSED WITH PATIENT:  I discussed the specifics of tumor, prognosis, and genetics of benign lesions.  I explained that treatment of these lesions would be purely cosmetic and not medically neccessary.  I discussed with patient different removal options including excision, cautery and /or laser.      Nature and genetics of benign skin lesions dicussed with patient.  Signs and Symptoms of skin cancer discussed with patient.  ABCDEs of melanoma reviewed with patient.  Patient encouraged to perform monthly skin exams.  UV precautions reviewed with patient.  Risks of non-melanoma skin cancer discussed with patient   Return to clinic in one year or sooner if needed.

## 2023-09-07 ENCOUNTER — TRANSFERRED RECORDS (OUTPATIENT)
Dept: HEALTH INFORMATION MANAGEMENT | Facility: CLINIC | Age: 68
End: 2023-09-07
Payer: MEDICARE

## 2023-10-09 NOTE — PATIENT INSTRUCTIONS
"It is important to remember that venous reflux disease is a life-long disease, and you should wear compression stockings as much as you can. They do not need to be worn at night while in bed. It is especially important to wear compression with long periods of sitting, standing, long car rides or if you will be flying. Compression socks should get refilled every 4-6 months. If you do a lot of standing it is good to do calf raises to help keep the blood pumping. If you sit a lot at work, it is good to get up periodically to walk around. Elevation of the foot of your bed 4-6\" helps the blood return back to where it is needed. We can refill your compression prescription for 1 year otherwise your primary care provider is able to refill them for you. Below is a list of places you may go to get your compression stockings.    Please call us with any issues or questions [phone].    [facility]  [address] [place] [address]  Phone: [phone]  Fax: [phone] [facility]  [address] [place] [address]  Phone: [phone]  Fax: [phone]   [facility]  [address] [place] [address]  Phone: [phone]  Fax: [phone] [facility]  [address]  [address] [place] [address]  Phone: [phone]  Fax: [phone]   [facility]  [address] [place] [address]  Phone: [phone] [facility]   [address] ([facility])  [place] [address]  Phone: [phone]   [facility]  [address]  [address] [place] [address]      Phone: [phone]  Fax: [phone] [address] [place] [address]      Phone: [phone]  Fax: [phone]     [facility]" Supply [email or web address]  [address], [place] [address]  [phone]    [facility]  [email or web address]  [address] [place] [address]  Phone: [phone]     [address] [place] [address]  Phone: [phone]    [address] [place] [address]  Phone: [phone] [address], [place] [address]  Phone: [phone] [address], [place] [address]  Phone: [phone]    [facility] [email or web address]  [address], [place] [address]  Phone: [phone]    [address] [place] [address]  Phone: [phone] [address], [place] [address]  Phone: [phone]    [employer]  [email or web address]  [phone]

## 2023-10-10 ENCOUNTER — OFFICE VISIT (OUTPATIENT)
Dept: VASCULAR SURGERY | Facility: CLINIC | Age: 68
End: 2023-10-10
Attending: SPECIALIST
Payer: MEDICARE

## 2023-10-10 VITALS
HEIGHT: 70 IN | WEIGHT: 180 LBS | RESPIRATION RATE: 12 BRPM | SYSTOLIC BLOOD PRESSURE: 105 MMHG | OXYGEN SATURATION: 98 % | BODY MASS INDEX: 25.77 KG/M2 | DIASTOLIC BLOOD PRESSURE: 69 MMHG | HEART RATE: 77 BPM

## 2023-10-10 DIAGNOSIS — I83.893 SYMPTOMATIC VARICOSE VEINS OF BOTH LOWER EXTREMITIES: Primary | ICD-10-CM

## 2023-10-10 PROCEDURE — G0463 HOSPITAL OUTPT CLINIC VISIT: HCPCS | Performed by: SPECIALIST

## 2023-10-10 PROCEDURE — 99213 OFFICE O/P EST LOW 20 MIN: CPT | Performed by: SPECIALIST

## 2023-10-10 RX ORDER — LACTOBACILLUS RHAMNOSUS GG 10B CELL
1 CAPSULE ORAL 2 TIMES DAILY
COMMUNITY

## 2023-10-10 ASSESSMENT — PAIN SCALES - GENERAL: PAINLEVEL: NO PAIN (0)

## 2023-10-10 NOTE — PROGRESS NOTES
"[facility]        Patient is here for a 6 month follow up to discuss bilateral RFA GSV/AASV 5/16 and 5/17/23. Pt stated she is doing well and denies any pain in legs. Swelling improved She does wear her knee-high compression stockings and elevating daily.     Pt is currently taking Aspirin.    /69   Pulse 77   Resp 12   Ht 5' 10\" (1.778 m)   Wt 180 lb (81.6 kg)   SpO2 98%   BMI 25.83 kg/m        The provider has been notified that the patient has no concerns.     Questions patient would like addressed today are: N/A.    Refills are needed: Yes    Has homecare services and agency name:  No           "

## 2023-10-11 NOTE — PROGRESS NOTES
[facility] Surgery Follow up    HPI:    68 year old year old female who returns for a follow up.  She underwent closure procedure this was done 6 months ago with both greater saphenous and anterior sensory saphenous branches.  She is done really quite well continue to be very active work on weight control and compression use.  Here for follow-up to discuss her outcomes and potential issues.    Allergies:Sulfa antibiotics    Past Medical History:   Diagnosis Date    Arthritis 2018    Basal cell carcinoma     Calculus of gallbladder with other cholecystitis, without mention of obstruction 06/07/2011    Goiter        Past Surgical History:   Procedure Laterality Date    ABDOMEN SURGERY      ARTHROSCOPY KNEE      left meniscus repair    ARTHROSCOPY KNEE RT/LT      left - meniscus repair    COLONOSCOPY N/A 08/24/2017    Procedure: COLONOSCOPY;  Colonoscopy  ;  Surgeon: Abhishek Escobedo MD;  Location: [facility]    COLONOSCOPY      COLONOSCOPY N/A 6/14/2023    Procedure: COLONOSCOPY, FLEXIBLE, WITH LESION REMOVAL USING SNARE;  Surgeon: Conner Paul DO;  Location: [facility]    KNEE SURGERY  09/2013    left knee replacement    LAPAROSCOPIC CHOLECYSTECTOMY  06/29/2011    Procedure:LAPAROSCOPIC CHOLECYSTECTOMY; Surgeon:SUMAYA ZAMORANO; Location:[facility]    LAPAROSCOPIC CHOLECYSTECTOMY      REPLACEMENT TOTAL KNEE Right 08/20/2018    Procedure: RIGHT TOTAL KNEE ARTHROPLASTY COMPUTER ASSIST;  Surgeon: Kevin Haji MD;  Location: [facility];  Service:     STONE ANALYSIS  2011    THYROID BIOPSY      TOTAL KNEE ARTHROPLASTY Left     TUBAL LIGATION  1980    VASCULAR SURGERY  2018       CURRENT MEDS:  Current Outpatient Medications   Medication    aspirin (ASA) 325 MG tablet    Cholecalciferol (VITAMIN D3 PO)    clobetasol (TEMOVATE) 0.05 % external ointment    famotidine (PEPCID) 20 MG tablet    Ibuprofen (ADVIL PO)    lactobacillus rhamnosus, GG, (CULTURELL) capsule    multivitamin (ONE-DAILY) tablet     "senna-docusate (SENOKOT-S;PERICOLACE) 8.6-50 MG per tablet     No current facility-administered medications for this visit.       Family History   Problem Relation Age of Onset    Diabetes Mother     Hypertension Mother     Cerebrovascular Disease Mother     Cancer Mother         melanoma    Dementia Mother     Basal cell carcinoma Mother     Squamous cell carcinoma Mother     Respiratory Father         copd    Cancer - colorectal Father     Bronchitis Father     Hypertension Sister     Multiple Sclerosis Sister     Neurologic Disorder Sister         ms    Diabetes Brother     Hypertension Brother     Breast Cancer Maternal Grandmother     Diabetes Paternal Grandmother     Other - See Comments Daughter         Transverse Myelitis    Asthma No family hx of     Prostate Cancer No family hx of         reports that she quit smoking about 23 years ago. Her smoking use included cigarettes. She has never used smokeless tobacco. She reports current alcohol use. She reports that she does not use drugs.    Review of Systems:  Negative except for about dilated veins great increase of symptoms or decrease of symptoms of pain and achiness heaviness swelling after the closure procedure.  Otherwise normal state of health.Otherwise twelve system of review is negative.      OBJECTIVE:  Vitals:    10/10/23 0900   BP: 105/69   Pulse: 77   Resp: 12   SpO2: 98%   Weight: 180 lb (81.6 kg)   Height: 5' 10\" (1.778 m)     Body mass index is 25.83 kg/m .    Status: doing well    EXAM:  GENERAL: This is a well-developed 68 year old female who appears her stated age  HEAD: normocephalic  HEENT: Pupils equal and reactive bilaterally  CARDIAC: RRR without murmur  CHEST/LUNG:  Clear to auscultation  ABDOMEN: Soft, nontender, nondistended, no masses    NEUROLOGIC: Focally intact, nonfocal  VASCULAR: Pulses intact, symmetrical upper and lower extremities.        Side:: Bilateral  VCSS  PAIN:: Absent: None  Varicose Veins:: Mild: Few " scattered  Venous Edema:: Absent: None  Skin Pigmentation:: Absent: None  Inflamation:: Absent: None  Induration:: Absent: None  Number of active ulcers:: 0  Active ulcer duration:: None  Active ulcer diameter:: None  Compression Therapy:: Full compliance stockings + elevation  VCSS Score:: 4  CEAP:: Simple varicose veins only    LABS:  Lab Results   Component Value Date    WBC 6.4 08/02/2018    HGB 10.5 08/27/2018    HCT 42.0 08/02/2018    MCV 92 08/02/2018     08/02/2018     Lab Results   Component Value Date    ALT 24 05/11/2023    AST 21 05/11/2023    ALKPHOS 54 05/11/2023        Images:     Exam Information    Exam Date Exam Time Accession # Performing Department Results    5/19/23 10:51 AM [id number] [facility] [place]      PACS Images     Show images for US Venous Post Ablation Legs Bilateral     Study Result    Narrative & Impression   Bilateral Venous Ultrasound Status Post Radiofrequency Ablation (Date: 05/19/23)        Indication: Follow-up saphenous vein Radiofrequency ablation.  Lower extremity pain/swelling     Date of Procedure: Right 05/17/23   Left 05/16/23      Right CFV/SFJ Compression:  Fully Compressible     Left CFV/SFJ Compression:  Fully Compressible     Reference:   (FC)-Fully Compressible           (PC)-Partially Compressible   (NC) Non-Compressible     Location Right GSV Right AASV   Proximal Thigh NC NC   Mid Thigh NC NC   Distal Thigh NC FC   Knee NC     Proximal Calf NC     Mid Calf NC     Distal Calf FC           Location Left GSV Left AASV   Proximal Thigh NC NC   Mid Thigh NC NC   Distal Thigh NC FC   Knee NC     Proximal Calf FC        Impression:   1.  Postoperative occlusion of the right greater saphenous vein from the proximal thigh to the mid calf.  Occlusion of the right anterior sensory saphenous vein from the proximal thigh to the mid thigh.  Patent saphenofemoral junction.  2.  Postoperative occlusion of the left greater  saphenous vein from the proximal thigh to the knee.  Occlusion of the left anterior sensory saphenous vein from the proximal thigh to the mid thigh.  Patent saphenofemoral junction.          Assessment/Plan:   Symptomatic varicose veins of both lower extremities     She is a status post closure of bilateral greater saphenous veins and bilateral anterior sensory saphenous veins.  She is really done quite well and worn compression had good relief and resolution of most range she still is 1 across each count in the area which is slightly dilated but not terrible.  We discussed her options with her just continue conservative management exercise elevation compression use or she could do sclerotherapy or she could do stab phlebectomy of these limited branch areas I think a stab can be done in the clinic just under straight local discussed procedures risk and benefits and at this time point she can continue watch it stay conservative and compression exercise and weight control return if 19 things change or worsen.  Discussed with him answer questions prescription was given.    Return if symptoms worsen or fail to improve.     Sergey Soto MD ,MD  [facility] Department of Surgery

## 2023-11-07 ENCOUNTER — TRANSFERRED RECORDS (OUTPATIENT)
Dept: HEALTH INFORMATION MANAGEMENT | Facility: CLINIC | Age: 68
End: 2023-11-07
Payer: MEDICARE

## 2024-03-25 ENCOUNTER — MYC MEDICAL ADVICE (OUTPATIENT)
Dept: FAMILY MEDICINE | Facility: CLINIC | Age: 69
End: 2024-03-25
Payer: MEDICARE

## 2024-03-25 SDOH — HEALTH STABILITY: PHYSICAL HEALTH: ON AVERAGE, HOW MANY MINUTES DO YOU ENGAGE IN EXERCISE AT THIS LEVEL?: 30 MIN

## 2024-03-25 SDOH — HEALTH STABILITY: PHYSICAL HEALTH: ON AVERAGE, HOW MANY DAYS PER WEEK DO YOU ENGAGE IN MODERATE TO STRENUOUS EXERCISE (LIKE A BRISK WALK)?: 4 DAYS

## 2024-03-25 ASSESSMENT — SOCIAL DETERMINANTS OF HEALTH (SDOH): HOW OFTEN DO YOU GET TOGETHER WITH FRIENDS OR RELATIVES?: TWICE A WEEK

## 2024-03-25 NOTE — TELEPHONE ENCOUNTER
MyChart reply sent to patient and closing encounter.     Zahra Paniagua RN  [facility]

## 2024-04-01 ENCOUNTER — OFFICE VISIT (OUTPATIENT)
Dept: FAMILY MEDICINE | Facility: CLINIC | Age: 69
End: 2024-04-01
Payer: MEDICARE

## 2024-04-01 VITALS
SYSTOLIC BLOOD PRESSURE: 118 MMHG | TEMPERATURE: 98 F | DIASTOLIC BLOOD PRESSURE: 78 MMHG | HEIGHT: 70 IN | BODY MASS INDEX: 27.73 KG/M2 | OXYGEN SATURATION: 98 % | HEART RATE: 67 BPM | RESPIRATION RATE: 12 BRPM | WEIGHT: 193.7 LBS

## 2024-04-01 DIAGNOSIS — Z00.00 ENCOUNTER FOR ROUTINE ADULT HEALTH EXAMINATION WITHOUT ABNORMAL FINDINGS: Primary | ICD-10-CM

## 2024-04-01 DIAGNOSIS — E55.9 VITAMIN D DEFICIENCY: ICD-10-CM

## 2024-04-01 LAB — VIT D+METAB SERPL-MCNC: 32 NG/ML (ref 20–50)

## 2024-04-01 PROCEDURE — G0439 PPPS, SUBSEQ VISIT: HCPCS | Performed by: FAMILY MEDICINE

## 2024-04-01 PROCEDURE — 82306 VITAMIN D 25 HYDROXY: CPT | Performed by: FAMILY MEDICINE

## 2024-04-01 PROCEDURE — 36415 COLL VENOUS BLD VENIPUNCTURE: CPT | Performed by: FAMILY MEDICINE

## 2024-04-01 ASSESSMENT — PAIN SCALES - GENERAL: PAINLEVEL: NO PAIN (0)

## 2024-04-01 NOTE — PROGRESS NOTES
"Preventive Care Visit  [facility]  Verito Ashby MD, Family Medicine  Apr 1, 2024      Assessment & Plan   Problem List Items Addressed This Visit    None  Visit Diagnoses       Encounter for routine adult health examination without abnormal findings    -  Primary    Vitamin D deficiency        Relevant Orders    Vitamin D Deficiency         69 yr old female here for her physical. Health maintenance discussed with her.   She is current on mammogram,colonoscopy and her vaccines.   She has no major complaints today. She wanted to recheck her vit d levels as she has history of low vit d.     Patient has been advised of split billing requirements and indicates understanding: Yes  Review of external notes as documented elsewhere in note       BMI  Estimated body mass index is 28.19 kg/m  as calculated from the following:    Height as of this encounter: 1.765 m (5' 9.5\").    Weight as of this encounter: 87.9 kg (193 lb 11.2 oz).       Counseling  Appropriate preventive services were discussed with this patient, including applicable screening as appropriate for fall prevention, nutrition, physical activity, Tobacco-use cessation, weight loss and cognition.  Checklist reviewing preventive services available has been given to the patient.  Reviewed patient's diet, addressing concerns and/or questions.   She is at risk for psychosocial distress and has been provided with information to reduce risk.   The patient was provided with written information regarding signs of hearing loss.     FUTURE APPOINTMENTS:       - Follow-up visit as needed      Keke Cotto is a 69 year old, presenting for the following:  Physical (Wellness visit)        4/1/2024    10:40 AM   Additional Questions   Roomed by Makenzie BRITO   Accompanied by self       Health Care Directive  Patient has a Health Care Directive on file  Discussed advance care planning with patient.    HPI  Patient is a 69 yr old female " here for her annual physical. She is relatively healthy and has no acute symptoms.   She has Fibromylagia and possibly IBS with constipation. She says she was evaluated by the GI specialist but has not followed up.   For the fibromyalgia she says she has tried different options but just lives with it.            3/25/2024   General Health   How would you rate your overall physical health? Good   Feel stress (tense, anxious, or unable to sleep) Only a little   (!) STRESS CONCERN      3/25/2024   Nutrition   Diet: Regular (no restrictions)         3/25/2024   Exercise   Days per week of moderate/strenous exercise 4 days   Average minutes spent exercising at this level 30 min         3/25/2024   Social Factors   Frequency of gathering with friends or relatives Twice a week   Worry food won't last until get money to buy more No   Food not last or not have enough money for food? No   Do you have housing?  Yes   Are you worried about losing your housing? No   Lack of transportation? No   Unable to get utilities (heat,electricity)? No         3/25/2024   Activities of Daily Living- Home Safety   Needs help with the following daily activites None of the above   Safety concerns in the home None of the above         3/25/2024   Dental   Dentist two times every year? Yes         3/25/2024   Hearing Screening   Hearing concerns? (!) TROUBLE UNDERSTANDING SOFT OR WHISPERED SPEECH.         3/25/2024   Driving Risk Screening   Patient/family members have concerns about driving No         3/25/2024   General Alertness/Fatigue Screening   Have you been more tired than usual lately? No         3/25/2024   Urinary Incontinence Screening   Bothered by leaking urine in past 6 months No         3/25/2024   TB Screening   Were you born outside of the [place]? No         Today's PHQ-2 Score:       4/1/2024    10:36 AM   PHQ-2 ( 1999 [employer])   Q1: Little interest or pleasure in doing things 0   Q2: Feeling down, depressed or hopeless 0   PHQ-2  Score 0   Q1: Little interest or pleasure in doing things Not at all   Q2: Feeling down, depressed or hopeless Not at all   PHQ-2 Score 0           3/25/2024   Substance Use   Alcohol more than 3/day or more than 7/wk No   Do you have a current opioid prescription? No   How severe/bad is pain from 1 to 10? 10   Do you use any other substances recreationally? No     Social History     Tobacco Use    Smoking status: Former     Packs/day: 0.00     Years: 0.00     Additional pack years: 0.00     Total pack years: 0.00     Types: Cigarettes     Quit date: 2000     Years since quittin.1    Smokeless tobacco: Never   Vaping Use    Vaping Use: Never used   Substance Use Topics    Alcohol use: Yes     Comment: 10 times a year..    Drug use: No           2023   LAST FHS-7 RESULTS   1st degree relative breast or ovarian cancer No   Any relative bilateral breast cancer No   Any male have breast cancer No   Any ONE woman have BOTH breast AND ovarian cancer No   Any woman with breast cancer before 50yrs No   2 or more relatives with breast AND/OR ovarian cancer No   2 or more relatives with breast AND/OR bowel cancer Yes       Mammogram Screening - Mammogram every 1-2 years updated in Health Maintenance based on mutual decision making    ASCVD Risk   The 10-year ASCVD risk score (Roseanna DK, et al., 2019) is: 8.4%    Values used to calculate the score:      Age: 69 years      Sex: Female      Is Non- : No      Diabetic: No      Tobacco smoker: No      Systolic Blood Pressure: 118 mmHg      Is BP treated: No      HDL Cholesterol: 80 mg/dL      Total Cholesterol: 183 mg/dL          Reviewed and updated as needed this visit by Provider     Meds  Problems  Med Hx  Surg Hx             Past Medical History:   Diagnosis Date    Arthritis 2018    Basal cell carcinoma     Calculus of gallbladder with other cholecystitis, without mention of obstruction 2011    Cancer (H) 2022     Basal right leg    Goiter      Past Surgical History:   Procedure Laterality Date    ABDOMEN SURGERY      ARTHROSCOPY KNEE      left meniscus repair    ARTHROSCOPY KNEE RT/LT      left - meniscus repair    COLONOSCOPY N/A 2017    Procedure: COLONOSCOPY;  Colonoscopy  ;  Surgeon: Abhishek Escobedo MD;  Location: [facility]    COLONOSCOPY      COLONOSCOPY N/A 2023    Procedure: COLONOSCOPY, FLEXIBLE, WITH LESION REMOVAL USING SNARE;  Surgeon: Conner Paul DO;  Location: [facility]    KNEE SURGERY  2013    left knee replacement    LAPAROSCOPIC CHOLECYSTECTOMY  2011    Procedure:LAPAROSCOPIC CHOLECYSTECTOMY; Surgeon:SUMAYA ZAMORANO; Location:[facility]    LAPAROSCOPIC CHOLECYSTECTOMY      REPLACEMENT TOTAL KNEE Right 2018    Procedure: RIGHT TOTAL KNEE ARTHROPLASTY COMPUTER ASSIST;  Surgeon: Kevin Haji MD;  Location: [facility];  Service:     STONE ANALYSIS      THYROID BIOPSY      TOTAL KNEE ARTHROPLASTY Left     TUBAL LIGATION  1980    VASCULAR SURGERY  2018     OB History    Para Term  AB Living   2 2 0 0 0 2   SAB IAB Ectopic Multiple Live Births   0 0 0 0 0      # Outcome Date GA Lbr Homer/2nd Weight Sex Delivery Anes PTL Lv   2 Para            1 Para              Lab work is in process  Labs reviewed in EPIC  BP Readings from Last 3 Encounters:   24 118/78   10/10/23 105/69   23 132/80    Wt Readings from Last 3 Encounters:   24 87.9 kg (193 lb 11.2 oz)   10/10/23 81.6 kg (180 lb)   23 86.6 kg (191 lb)                  Patient Active Problem List   Diagnosis    Esophageal reflux    Myalgia and myositis    Goiter    Status post total left knee replacement    CARDIOVASCULAR SCREENING; LDL GOAL LESS THAN 160    Renal calculus or stone    IBS (irritable bowel syndrome)    Advanced directives, counseling/discussion    Thrombophlebitis leg    Vulvar ulcer     Past Surgical History:   Procedure Laterality Date    ABDOMEN SURGERY       ARTHROSCOPY KNEE      left meniscus repair    ARTHROSCOPY KNEE RT/LT      left - meniscus repair    COLONOSCOPY N/A 2017    Procedure: COLONOSCOPY;  Colonoscopy  ;  Surgeon: Abhishek Escobedo MD;  Location: [facility]    COLONOSCOPY      COLONOSCOPY N/A 2023    Procedure: COLONOSCOPY, FLEXIBLE, WITH LESION REMOVAL USING SNARE;  Surgeon: Conner Paul DO;  Location: [facility]    KNEE SURGERY  2013    left knee replacement    LAPAROSCOPIC CHOLECYSTECTOMY  2011    Procedure:LAPAROSCOPIC CHOLECYSTECTOMY; Surgeon:SUMAYA ZAMORANO; Location:[facility]    LAPAROSCOPIC CHOLECYSTECTOMY      REPLACEMENT TOTAL KNEE Right 2018    Procedure: RIGHT TOTAL KNEE ARTHROPLASTY COMPUTER ASSIST;  Surgeon: Kevin Haji MD;  Location: [facility];  Service:     STONE ANALYSIS      THYROID BIOPSY      TOTAL KNEE ARTHROPLASTY Left     TUBAL LIGATION  1980    VASCULAR SURGERY         Social History     Tobacco Use    Smoking status: Former     Packs/day: 0.00     Years: 0.00     Additional pack years: 0.00     Total pack years: 0.00     Types: Cigarettes     Quit date: 2000     Years since quittin.1    Smokeless tobacco: Never   Substance Use Topics    Alcohol use: Yes     Comment: 10 times a year..     Family History   Problem Relation Age of Onset    Diabetes Mother     Hypertension Mother     Cerebrovascular Disease Mother     Cancer Mother         melanoma    Dementia Mother     Basal cell carcinoma Mother     Squamous cell carcinoma Mother     Respiratory Father         copd    Cancer - colorectal Father     Bronchitis Father     Hypertension Sister     Multiple Sclerosis Sister     Neurologic Disorder Sister         ms    Diabetes Brother     Hypertension Brother     Breast Cancer Maternal Grandmother     Diabetes Paternal Grandmother     Other - See Comments Daughter         Transverse Myelitis    Hypertension Sister     Asthma No family hx of     Prostate Cancer No  family hx of          Current Outpatient Medications   Medication Sig Dispense Refill    aspirin (ASA) 325 MG tablet Take 1 tablet (325 mg) by mouth daily 90 tablet 0    Cholecalciferol (VITAMIN D3 PO) Take 25 mcg by mouth daily      clobetasol (TEMOVATE) 0.05 % external ointment Apply topically 2 times daily 30 g 5    famotidine (PEPCID) 20 MG tablet Take 1 tablet (20 mg) by mouth 2 times daily 30 tablet 6    Ibuprofen (ADVIL PO) Take 400 mg by mouth 2 times daily      lactobacillus rhamnosus, GG, (CULTURELL) capsule Take 1 capsule by mouth 2 times daily      multivitamin (ONE-DAILY) tablet Take 1 tablet by mouth daily Also has Vitamin D 25 mcg in the vitamin.      senna-docusate (SENOKOT-S;PERICOLACE) 8.6-50 MG per tablet Take 2 tablets by mouth 2 times daily        Allergies   Allergen Reactions    Sulfa Antibiotics Hives     Current providers sharing in care for this patient include:  Patient Care Team:  Verito Ashby MD as PCP - General (Family Practice)  Verito Ashby MD as Assigned PCP  Tiffany Zuleta PA-C as Physician Assistant (Dermatology)  Sergey Soto MD as Assigned Surgical Provider    The following health maintenance items are reviewed in Epic and correct as of today:  Health Maintenance   Topic Date Due    RSV VACCINE (Pregnancy & 60+) (1 - 1-dose 60+ series) Never done    INFLUENZA VACCINE (1) 09/01/2023    COVID-19 Vaccine (1 - 2023-24 season) Never done    MEDICARE ANNUAL WELLNESS VISIT  03/27/2024    ANNUAL REVIEW OF HM ORDERS  04/01/2025    FALL RISK ASSESSMENT  04/01/2025    MAMMO SCREENING  04/13/2025    GLUCOSE  05/11/2026    DTAP/TDAP/TD IMMUNIZATION (4 - Td or Tdap) 10/03/2026    LIPID  01/20/2028    ADVANCE CARE PLANNING  03/27/2028    COLORECTAL CANCER SCREENING  06/14/2028    DEXA  01/12/2038    HEPATITIS C SCREENING  Completed    PHQ-2 (once per calendar year)  Completed    Pneumococcal Vaccine: 65+ Years  Completed    ZOSTER IMMUNIZATION   "Completed    IPV IMMUNIZATION  Aged Out    HPV IMMUNIZATION  Aged Out    MENINGITIS IMMUNIZATION  Aged Out    RSV MONOCLONAL ANTIBODY  Aged Out         Review of Systems  CONSTITUTIONAL: NEGATIVE for fever, chills, change in weight  INTEGUMENTARY/SKIN: NEGATIVE for worrisome rashes, moles or lesions  EYES: NEGATIVE for vision changes or irritation  ENT/MOUTH: NEGATIVE for ear, mouth and throat problems  RESP: NEGATIVE for significant cough or SOB  CV: NEGATIVE for chest pain, palpitations or peripheral edema  GI: NEGATIVE for nausea, abdominal pain, heartburn, or change in bowel habits  : NEGATIVE for frequency, dysuria, or hematuria  MUSCULOSKELETAL: NEGATIVE for significant arthralgias or myalgia  NEURO: NEGATIVE for weakness, dizziness or paresthesias  ENDOCRINE: NEGATIVE for temperature intolerance, skin/hair changes  HEME: NEGATIVE for bleeding problems  PSYCHIATRIC: NEGATIVE for changes in mood or affect     Objective    Exam  /78 (BP Location: Left arm, Patient Position: Chair, Cuff Size: Adult Large)   Pulse 67   Temp 98  F (36.7  C) (Tympanic)   Resp 12   Ht 1.765 m (5' 9.5\")   Wt 87.9 kg (193 lb 11.2 oz)   SpO2 98%   BMI 28.19 kg/m     Estimated body mass index is 28.19 kg/m  as calculated from the following:    Height as of this encounter: 1.765 m (5' 9.5\").    Weight as of this encounter: 87.9 kg (193 lb 11.2 oz).    Physical Exam  GENERAL: alert and no distress  EYES: Eyes grossly normal to inspection, PERRL and conjunctivae and sclerae normal  HENT: ear canals and TM's normal, nose and mouth without ulcers or lesions  NECK: no adenopathy, no asymmetry, masses, or scars  RESP: lungs clear to auscultation - no rales, rhonchi or wheezes  CV: regular rate and rhythm, normal S1 S2, no S3 or S4, no murmur, click or rub, no peripheral edema  ABDOMEN: soft, nontender, no hepatosplenomegaly, no masses and bowel sounds normal  MS: no gross musculoskeletal defects noted, no edema  SKIN: no " suspicious lesions or rashes  PSYCH: mentation appears normal, affect normal/bright        4/1/2024   Mini Cog   Clock Draw Score 2 Normal   3 Item Recall 3 objects recalled   Mini Cog Total Score 5            Signed Electronically by: Verito Ashby MD

## 2024-04-26 ENCOUNTER — HOSPITAL ENCOUNTER (EMERGENCY)
Facility: CLINIC | Age: 69
Discharge: HOME | End: 2024-04-27
Attending: STUDENT IN AN ORGANIZED HEALTH CARE EDUCATION/TRAINING PROGRAM | Admitting: STUDENT IN AN ORGANIZED HEALTH CARE EDUCATION/TRAINING PROGRAM
Payer: MEDICARE

## 2024-04-26 DIAGNOSIS — R50.9 FEVER OF UNKNOWN ORIGIN: ICD-10-CM

## 2024-04-26 PROCEDURE — 99283 EMERGENCY DEPT VISIT LOW MDM: CPT | Performed by: STUDENT IN AN ORGANIZED HEALTH CARE EDUCATION/TRAINING PROGRAM

## 2024-04-26 PROCEDURE — 99284 EMERGENCY DEPT VISIT MOD MDM: CPT | Mod: 25 | Performed by: STUDENT IN AN ORGANIZED HEALTH CARE EDUCATION/TRAINING PROGRAM

## 2024-04-26 RX ORDER — ACETAMINOPHEN 500 MG
1000 TABLET ORAL ONCE
Status: COMPLETED | OUTPATIENT
Start: 2024-04-27 | End: 2024-04-27

## 2024-04-26 ASSESSMENT — COLUMBIA-SUICIDE SEVERITY RATING SCALE - C-SSRS
6. HAVE YOU EVER DONE ANYTHING, STARTED TO DO ANYTHING, OR PREPARED TO DO ANYTHING TO END YOUR LIFE?: NO
2. HAVE YOU ACTUALLY HAD ANY THOUGHTS OF KILLING YOURSELF IN THE PAST MONTH?: NO
1. IN THE PAST MONTH, HAVE YOU WISHED YOU WERE DEAD OR WISHED YOU COULD GO TO SLEEP AND NOT WAKE UP?: NO

## 2024-04-27 ENCOUNTER — APPOINTMENT (OUTPATIENT)
Dept: GENERAL RADIOLOGY | Facility: CLINIC | Age: 69
End: 2024-04-27
Attending: STUDENT IN AN ORGANIZED HEALTH CARE EDUCATION/TRAINING PROGRAM
Payer: MEDICARE

## 2024-04-27 VITALS
BODY MASS INDEX: 25.77 KG/M2 | SYSTOLIC BLOOD PRESSURE: 113 MMHG | HEIGHT: 70 IN | HEART RATE: 104 BPM | OXYGEN SATURATION: 97 % | DIASTOLIC BLOOD PRESSURE: 71 MMHG | WEIGHT: 180 LBS | RESPIRATION RATE: 20 BRPM | TEMPERATURE: 99.8 F

## 2024-04-27 LAB
ALBUMIN SERPL BCG-MCNC: 4.2 G/DL (ref 3.5–5.2)
ALBUMIN UR-MCNC: NEGATIVE MG/DL
ALP SERPL-CCNC: 51 U/L (ref 40–150)
ALT SERPL W P-5'-P-CCNC: 22 U/L (ref 0–50)
ANION GAP SERPL CALCULATED.3IONS-SCNC: 12 MMOL/L (ref 7–15)
APPEARANCE UR: CLEAR
AST SERPL W P-5'-P-CCNC: 20 U/L (ref 0–45)
BACTERIA #/AREA URNS HPF: ABNORMAL /HPF
BASOPHILS # BLD AUTO: 0 10E3/UL (ref 0–0.2)
BASOPHILS NFR BLD AUTO: 1 %
BILIRUB SERPL-MCNC: 0.8 MG/DL
BILIRUB UR QL STRIP: NEGATIVE
BUN SERPL-MCNC: 16.3 MG/DL (ref 8–23)
CALCIUM SERPL-MCNC: 9.3 MG/DL (ref 8.8–10.2)
CHLORIDE SERPL-SCNC: 100 MMOL/L (ref 98–107)
COLOR UR AUTO: YELLOW
CREAT SERPL-MCNC: 0.85 MG/DL (ref 0.51–0.95)
DEPRECATED HCO3 PLAS-SCNC: 24 MMOL/L (ref 22–29)
EGFRCR SERPLBLD CKD-EPI 2021: 73 ML/MIN/1.73M2
EOSINOPHIL # BLD AUTO: 0.1 10E3/UL (ref 0–0.7)
EOSINOPHIL NFR BLD AUTO: 2 %
ERYTHROCYTE [DISTWIDTH] IN BLOOD BY AUTOMATED COUNT: 13 % (ref 10–15)
FLUAV RNA SPEC QL NAA+PROBE: NEGATIVE
FLUBV RNA RESP QL NAA+PROBE: NEGATIVE
GLUCOSE SERPL-MCNC: 106 MG/DL (ref 70–99)
GLUCOSE UR STRIP-MCNC: NEGATIVE MG/DL
HCT VFR BLD AUTO: 41.2 % (ref 35–47)
HGB BLD-MCNC: 13.6 G/DL (ref 11.7–15.7)
HGB UR QL STRIP: NEGATIVE
IMM GRANULOCYTES # BLD: 0 10E3/UL
IMM GRANULOCYTES NFR BLD: 0 %
KETONES UR STRIP-MCNC: NEGATIVE MG/DL
LEUKOCYTE ESTERASE UR QL STRIP: ABNORMAL
LYMPHOCYTES # BLD AUTO: 1 10E3/UL (ref 0.8–5.3)
LYMPHOCYTES NFR BLD AUTO: 16 %
MCH RBC QN AUTO: 30.2 PG (ref 26.5–33)
MCHC RBC AUTO-ENTMCNC: 33 G/DL (ref 31.5–36.5)
MCV RBC AUTO: 92 FL (ref 78–100)
MONOCYTES # BLD AUTO: 0.5 10E3/UL (ref 0–1.3)
MONOCYTES NFR BLD AUTO: 8 %
MUCOUS THREADS #/AREA URNS LPF: PRESENT /LPF
NEUTROPHILS # BLD AUTO: 4.6 10E3/UL (ref 1.6–8.3)
NEUTROPHILS NFR BLD AUTO: 74 %
NITRATE UR QL: NEGATIVE
PH UR STRIP: 7 [PH] (ref 5–7)
PLATELET # BLD AUTO: 159 10E3/UL (ref 150–450)
POTASSIUM SERPL-SCNC: 3.8 MMOL/L (ref 3.4–5.3)
PROT SERPL-MCNC: 6.4 G/DL (ref 6.4–8.3)
RBC # BLD AUTO: 4.5 10E6/UL (ref 3.8–5.2)
RBC URINE: <1 /HPF
RSV RNA SPEC NAA+PROBE: NEGATIVE
SARS-COV-2 RNA RESP QL NAA+PROBE: NEGATIVE
SODIUM SERPL-SCNC: 136 MMOL/L (ref 135–145)
SP GR UR STRIP: 1.01 (ref 1–1.03)
SQUAMOUS EPITHELIAL: <1 /HPF
UROBILINOGEN UR STRIP-MCNC: NORMAL MG/DL
WBC # BLD AUTO: 6.2 10E3/UL (ref 4–11)
WBC URINE: 5 /HPF

## 2024-04-27 PROCEDURE — 86618 LYME DISEASE ANTIBODY: CPT | Performed by: STUDENT IN AN ORGANIZED HEALTH CARE EDUCATION/TRAINING PROGRAM

## 2024-04-27 PROCEDURE — 250N000013 HC RX MED GY IP 250 OP 250 PS 637: Performed by: STUDENT IN AN ORGANIZED HEALTH CARE EDUCATION/TRAINING PROGRAM

## 2024-04-27 PROCEDURE — 85025 COMPLETE CBC W/AUTO DIFF WBC: CPT | Performed by: STUDENT IN AN ORGANIZED HEALTH CARE EDUCATION/TRAINING PROGRAM

## 2024-04-27 PROCEDURE — 80053 COMPREHEN METABOLIC PANEL: CPT | Performed by: STUDENT IN AN ORGANIZED HEALTH CARE EDUCATION/TRAINING PROGRAM

## 2024-04-27 PROCEDURE — 71046 X-RAY EXAM CHEST 2 VIEWS: CPT

## 2024-04-27 PROCEDURE — 87637 SARSCOV2&INF A&B&RSV AMP PRB: CPT | Performed by: STUDENT IN AN ORGANIZED HEALTH CARE EDUCATION/TRAINING PROGRAM

## 2024-04-27 PROCEDURE — 36415 COLL VENOUS BLD VENIPUNCTURE: CPT | Performed by: STUDENT IN AN ORGANIZED HEALTH CARE EDUCATION/TRAINING PROGRAM

## 2024-04-27 PROCEDURE — 81001 URINALYSIS AUTO W/SCOPE: CPT | Performed by: STUDENT IN AN ORGANIZED HEALTH CARE EDUCATION/TRAINING PROGRAM

## 2024-04-27 RX ADMIN — ACETAMINOPHEN 1000 MG: 500 TABLET, FILM COATED ORAL at 00:01

## 2024-04-27 ASSESSMENT — ACTIVITIES OF DAILY LIVING (ADL)
ADLS_ACUITY_SCORE: 35
ADLS_ACUITY_SCORE: 35

## 2024-04-27 NOTE — DISCHARGE INSTRUCTIONS
I am not exactly sure what is causing your fever.  All of your lab workup was reassuring.  Your viral swabs were negative.  Your chest x-ray was normal.  Your Lyme test is pending and should be back within 24 hours.  You will get a call if this comes back positive.  For now, get plenty of rest and drink lots of fluids.  You can use Tylenol or ibuprofen as needed for pain and fever.  Follow-up with your regular doctor if not improving.  Return to the ER if you develop any new or worsening symptoms.

## 2024-04-27 NOTE — ED TRIAGE NOTES
"Pt arrives with c/o fever, \"shaking\", and body aches since 2200. Pt denies URI sx, dysuria, nausea, or diarrhea. Denies recent travel or sick contacts.     Triage Assessment (Adult)       Row Name 04/26/24 5611          Triage Assessment    Airway WDL WDL        Respiratory WDL    Respiratory WDL WDL        Skin Circulation/Temperature WDL    Skin Circulation/Temperature WDL WDL        Cardiac WDL    Cardiac WDL WDL        Peripheral/Neurovascular WDL    Peripheral Neurovascular WDL WDL        Cognitive/Neuro/Behavioral WDL    Cognitive/Neuro/Behavioral WDL WDL                     "

## 2024-04-27 NOTE — ED PROVIDER NOTES
History     Chief Complaint   Patient presents with    Viral Syndrome     HPI  Ina Otoole is a 69 year old female who has GERD, IBS, fibromyalgia who presents to the emergency department for evaluation of fever.  Patient states that she was in her normal state of health when she went to bed, but she woke up in the middle of the night with rigors.  She states she checked her temperature and it was 102.  Upon arrival to the ER she was afebrile.  She did not take any antipyretics.  Patient has no other acute complaints.  She has some joint pain that she states is chronic and unchanged.  She denies any hot swollen joints.  She denies stiff neck.  She denies nausea or vomiting.  No sore throat, runny nose, ear pain or cough.  No trouble breathing.  No chest pain.  No abdominal pain.  No diarrhea, melena or hematochezia.  No dysuria, urgency or frequency.  No rashes.  No recent travel.  She has spent some time in the outdoors walking through grass, but denies any known tick bites.  No known ill contacts.  No new medications.    Allergies:  Allergies   Allergen Reactions    Sulfa Antibiotics Hives       Problem List:    Patient Active Problem List    Diagnosis Date Noted    Renal calculus or stone 07/28/2011     Priority: High     Left, with mild hydronephrosis          Vulvar ulcer 12/30/2020     Priority: Medium    Thrombophlebitis leg 08/02/2018     Priority: Medium    Advanced directives, counseling/discussion 08/28/2013     Priority: Medium     Advance Care Planning:   Receipt of ACP document:  Received: Health Care Directive which was witnessed or notarized on 8/22/13.  Document not previously scanned.  Validation form completed and sent with document to be scanned.  Code Status reflects choices in most recent ACP document.  Confirmed/documented designated decision maker(s). See permanent comments section of demographics in clinical tab. View document(s) and details by clicking on code status.   Added by Luz Marina  Michaela on 9/3/2013.          IBS (irritable bowel syndrome) 04/24/2012     Priority: Medium     With chronic constipation      CARDIOVASCULAR SCREENING; LDL GOAL LESS THAN 160 10/31/2010     Priority: Medium    Status post total left knee replacement 11/26/2008     Priority: Medium    Myalgia and myositis      Priority: Medium     Dx in 1995 based on pain sx.   Went to [facility] [place], had botox injections into trigger points, went to pain clinic, PT.  Never had any improvement.    Problem list name updated by automated process. Provider to review      Goiter      Priority: Medium     Had aspiration, was on replacement for about a year, hasn't needed since.    Problem list name updated by automated process. Provider to review      Esophageal reflux 02/07/2007     Priority: Medium     Was given script for PPI, but couldn't afford, has been using tums and zantac          Past Medical History:    Past Medical History:   Diagnosis Date    Arthritis 2018    Basal cell carcinoma     Calculus of gallbladder with other cholecystitis, without mention of obstruction 06/07/2011    Cancer (H) Nov 2022    Goiter        Past Surgical History:    Past Surgical History:   Procedure Laterality Date    ABDOMEN SURGERY      ARTHROSCOPY KNEE      left meniscus repair    ARTHROSCOPY KNEE RT/LT      left - meniscus repair    COLONOSCOPY N/A 08/24/2017    Procedure: COLONOSCOPY;  Colonoscopy  ;  Surgeon: Abhishek Escobedo MD;  Location: [facility]    COLONOSCOPY      COLONOSCOPY N/A 6/14/2023    Procedure: COLONOSCOPY, FLEXIBLE, WITH LESION REMOVAL USING SNARE;  Surgeon: Conner Paul DO;  Location: [facility]    KNEE SURGERY  09/2013    left knee replacement    LAPAROSCOPIC CHOLECYSTECTOMY  06/29/2011    Procedure:LAPAROSCOPIC CHOLECYSTECTOMY; Surgeon:SUMAYA ZAMORANO; Location:[facility]    LAPAROSCOPIC CHOLECYSTECTOMY      REPLACEMENT TOTAL KNEE Right 08/20/2018    Procedure: RIGHT TOTAL KNEE ARTHROPLASTY COMPUTER  "ASSIST;  Surgeon: Kevin Haji MD;  Location: [facility];  Service:     STONE ANALYSIS      THYROID BIOPSY      TOTAL KNEE ARTHROPLASTY Left     TUBAL LIGATION  1980    VASCULAR SURGERY  2018       Family History:    Family History   Problem Relation Age of Onset    Diabetes Mother     Hypertension Mother     Cerebrovascular Disease Mother     Cancer Mother         melanoma    Dementia Mother     Basal cell carcinoma Mother     Squamous cell carcinoma Mother     Respiratory Father         copd    Cancer - colorectal Father     Bronchitis Father     Hypertension Sister     Multiple Sclerosis Sister     Neurologic Disorder Sister         ms    Diabetes Brother     Hypertension Brother     Breast Cancer Maternal Grandmother     Diabetes Paternal Grandmother     Other - See Comments Daughter         Transverse Myelitis    Hypertension Sister     Asthma No family hx of     Prostate Cancer No family hx of        Social History:  Marital Status:   [5]  Social History     Tobacco Use    Smoking status: Former     Current packs/day: 0.00     Types: Cigarettes     Quit date: 2000     Years since quittin.1    Smokeless tobacco: Never   Vaping Use    Vaping status: Never Used   Substance Use Topics    Alcohol use: Yes     Comment: 10 times a year..    Drug use: No        Medications:    aspirin (ASA) 325 MG tablet  Cholecalciferol (VITAMIN D3 PO)  clobetasol (TEMOVATE) 0.05 % external ointment  famotidine (PEPCID) 20 MG tablet  Ibuprofen (ADVIL PO)  lactobacillus rhamnosus, GG, (CULTURELL) capsule  multivitamin (ONE-DAILY) tablet  senna-docusate (SENOKOT-S;PERICOLACE) 8.6-50 MG per tablet          Review of Systems  See HPI  Physical Exam   BP: 137/81  Pulse: 100  Temp: 99.1  F (37.3  C)  Resp: 20  Height: 177.8 cm (5' 10\")  Weight: 81.6 kg (180 lb)  SpO2: 97 %      Physical Exam  /71   Pulse 104   Temp 99.8  F (37.7  C) (Oral)   Resp 20   Ht 1.778 m (5' 10\")   Wt 81.6 kg (180 lb)  "  SpO2 97%   BMI 25.83 kg/m    General: alert, interactive, in no apparent distress  Head: atraumatic  Nose: no rhinorrhea or epistaxis  Ears: no external auditory canal discharge or bleeding.    Eyes: Sclera nonicteric. Conjunctiva noninjected. PERRL, EOMI  Mouth: no tonsillar erythema, edema, or exudate.  Moist mucous membranes  Neck: supple, moving spontaneously no midline cervical tenderness  Lungs: No increased work of breathing.  Clear to auscultation bilaterally.  CV: RRR, peripheral pulses palpable and symmetric  Abdomen: soft, nt, nd, no guarding or rebound.   Extremities: Warm and well-perfused.  No edema or calf tenderness bilaterally.  No erythematous, warm or tender joints.  Skin: no rash or diaphoresis  Neuro: CN II-XII grossly intact, strength 5/5 in UE and LEs bilaterally, sensation intact to light touch in UE and LEs bilaterally;     ED Course        Procedures             Critical Care time:  none             Results for orders placed or performed during the hospital encounter of 04/26/24 (from the past 24 hour(s))   Symptomatic Influenza A/B, RSV, & SARS-CoV2 PCR (COVID-19) Nose    Specimen: Nose; Swab   Result Value Ref Range    Influenza A PCR Negative Negative    Influenza B PCR Negative Negative    RSV PCR Negative Negative    SARS CoV2 PCR Negative Negative    Narrative    Testing was performed using the Xpert Xpress CoV2/Flu/RSV Assay on the [employer] GeneXpert Instrument. This test should be ordered for the detection of SARS-CoV-2, influenza, and RSV viruses in individuals who meet clinical and/or epidemiological criteria. Test performance is unknown in asymptomatic patients. This test is for in vitro diagnostic use under the FDA EUA for laboratories certified under CLIA to perform high or moderate complexity testing. This test has not been FDA cleared or approved. A negative result does not rule out the presence of PCR inhibitors in the specimen or target RNA in concentration below the  limit of detection for the assay. If only one viral target is positive but coinfection with multiple targets is suspected, the sample should be re-tested with another FDA cleared, approved, or authorized test, if coinfection would change clinical management. This test was validated by the [facility]. These laboratories are certified under the Clinical Laboratory Improvement Amendments of 1988 (CLIA-88) as qualified to perform high complexity laboratory testing.   CBC with platelets, differential    Narrative    The following orders were created for panel order CBC with platelets, differential.  Procedure                               Abnormality         Status                     ---------                               -----------         ------                     CBC with platelets and d...[[id number]]                      Final result                 Please view results for these tests on the individual orders.   Comprehensive metabolic panel   Result Value Ref Range    Sodium 136 135 - 145 mmol/L    Potassium 3.8 3.4 - 5.3 mmol/L    Carbon Dioxide (CO2) 24 22 - 29 mmol/L    Anion Gap 12 7 - 15 mmol/L    Urea Nitrogen 16.3 8.0 - 23.0 mg/dL    Creatinine 0.85 0.51 - 0.95 mg/dL    GFR Estimate 73 >60 mL/min/1.73m2    Calcium 9.3 8.8 - 10.2 mg/dL    Chloride 100 98 - 107 mmol/L    Glucose 106 (H) 70 - 99 mg/dL    Alkaline Phosphatase 51 40 - 150 U/L    AST 20 0 - 45 U/L    ALT 22 0 - 50 U/L    Protein Total 6.4 6.4 - 8.3 g/dL    Albumin 4.2 3.5 - 5.2 g/dL    Bilirubin Total 0.8 <=1.2 mg/dL   CBC with platelets and differential   Result Value Ref Range    WBC Count 6.2 4.0 - 11.0 10e3/uL    RBC Count 4.50 3.80 - 5.20 10e6/uL    Hemoglobin 13.6 11.7 - 15.7 g/dL    Hematocrit 41.2 35.0 - 47.0 %    MCV 92 78 - 100 fL    MCH 30.2 26.5 - 33.0 pg    MCHC 33.0 31.5 - 36.5 g/dL    RDW 13.0 10.0 - 15.0 %    Platelet Count 159 150 - 450 10e3/uL    % Neutrophils 74 %    % Lymphocytes 16 %    % Monocytes 8 %     % Eosinophils 2 %    % Basophils 1 %    % Immature Granulocytes 0 %    Absolute Neutrophils 4.6 1.6 - 8.3 10e3/uL    Absolute Lymphocytes 1.0 0.8 - 5.3 10e3/uL    Absolute Monocytes 0.5 0.0 - 1.3 10e3/uL    Absolute Eosinophils 0.1 0.0 - 0.7 10e3/uL    Absolute Basophils 0.0 0.0 - 0.2 10e3/uL    Absolute Immature Granulocytes 0.0 <=0.4 10e3/uL   XR Chest 2 Views    Narrative    EXAM: XR CHEST 2 VIEWS  LOCATION: [facility]  DATE: 4/27/2024    INDICATION: Fever  COMPARISON: 12/13/2015.    FINDINGS: The heart size is normal. The thoracic aorta is tortuous. The lungs are clear. No pneumothorax or pleural effusion. Degenerative disease in the spine and right shoulder.      Impression    IMPRESSION: No acute abnormality.   UA with Microscopic reflex to Culture    Specimen: Urine, Midstream   Result Value Ref Range    Color Urine Yellow Colorless, Straw, Light Yellow, Yellow    Appearance Urine Clear Clear    Glucose Urine Negative Negative mg/dL    Bilirubin Urine Negative Negative    Ketones Urine Negative Negative mg/dL    Specific Gravity Urine 1.014 1.003 - 1.035    Blood Urine Negative Negative    pH Urine 7.0 5.0 - 7.0    Protein Albumin Urine Negative Negative mg/dL    Urobilinogen Urine Normal Normal, 2.0 mg/dL    Nitrite Urine Negative Negative    Leukocyte Esterase Urine Small (A) Negative    Bacteria Urine Few (A) None Seen /HPF    Mucus Urine Present (A) None Seen /LPF    RBC Urine <1 <=2 /HPF    WBC Urine 5 <=5 /HPF    Squamous Epithelials Urine <1 <=1 /HPF    Narrative    Urine Culture not indicated       Medications   acetaminophen (TYLENOL) tablet 1,000 mg (1,000 mg Oral $Given 4/27/24 0001)       Assessments & Plan (with Medical Decision Making)     I have reviewed the nursing notes.    I have reviewed the findings, diagnosis, plan and need for follow up with the patient.          Medical Decision Making  Ina Otoole is a 69 year old female who has GERD, IBS,  fibromyalgia who presents to the emergency department for evaluation of fever.  Vital signs reviewed and reassuring.  Patient is afebrile here.  She reported fever up to 102 at home.  She was given Tylenol here with significant improvement in symptoms.  Laboratory studies were obtained due to fever of unknown origin.  Also obtain chest x-ray and urinalysis.  Also added on a Lyme test given her history of being outdoors.  She has no obvious rashes and no reported history of tick bite so we will hold off on empiric treatment.  Workup is reviewed and is relatively reassuring.  UA is without evidence of UTI.  CBC is without leukocytosis or anemia.  Her viral swabs are negative.  Her CMP is normal.  Her chest x-ray is reviewed by me and I agree with radiology.  There is no pneumonia or other acute findings.  Her Lyme test is pending.  When patient was reassessed, she reported feeling much better.  She does not have any symptoms at this time.  Unclear etiology of her reported fever.  She has not any new medications to suggest drug fever.  No neck stiffness, headache or abnormal neurological findings to suggest CNS infection.  She really does not have any other symptoms to help guide workup.  At this time though she is well-appearing and nontoxic and I do not think further workup is needed in the ER and I do not think she would benefit from hospital admission at this time and I think a trial of outpatient management is reasonable.  Patient is in agreement to go home and see how she does.  I recommend she get plenty of rest and drink lots of fluids.  She can use Tylenol as needed.  I recommend close outpatient follow-up.  I stressed the importance of her returning if she develops new or worsening symptoms.  She expressed understanding and all questions were answered.  She is discharged in stable condition        Discharge Medication List as of 4/27/2024  2:19 AM          Final diagnoses:   Fever of unknown origin        4/26/2024   [facility] EMERGENCY DEPT       Neo Ballesteros MD  04/27/24 0226

## 2024-04-29 LAB — B BURGDOR IGG+IGM SER QL: 0.09

## 2024-05-01 ENCOUNTER — VIRTUAL VISIT (OUTPATIENT)
Dept: FAMILY MEDICINE | Facility: CLINIC | Age: 69
End: 2024-05-01
Payer: MEDICARE

## 2024-05-01 DIAGNOSIS — F41.9 ANXIETY: Primary | ICD-10-CM

## 2024-05-01 PROCEDURE — 99213 OFFICE O/P EST LOW 20 MIN: CPT | Mod: 95 | Performed by: FAMILY MEDICINE

## 2024-05-01 PROCEDURE — 96127 BRIEF EMOTIONAL/BEHAV ASSMT: CPT | Performed by: FAMILY MEDICINE

## 2024-05-01 RX ORDER — HYDROXYZINE HYDROCHLORIDE 25 MG/1
25 TABLET, FILM COATED ORAL 3 TIMES DAILY PRN
Qty: 60 TABLET | Refills: 1 | Status: SHIPPED | OUTPATIENT
Start: 2024-05-01

## 2024-05-01 ASSESSMENT — PATIENT HEALTH QUESTIONNAIRE - PHQ9
5. POOR APPETITE OR OVEREATING: SEVERAL DAYS
SUM OF ALL RESPONSES TO PHQ QUESTIONS 1-9: 3

## 2024-05-01 ASSESSMENT — ANXIETY QUESTIONNAIRES
GAD7 TOTAL SCORE: 10
2. NOT BEING ABLE TO STOP OR CONTROL WORRYING: NEARLY EVERY DAY
6. BECOMING EASILY ANNOYED OR IRRITABLE: SEVERAL DAYS
IF YOU CHECKED OFF ANY PROBLEMS ON THIS QUESTIONNAIRE, HOW DIFFICULT HAVE THESE PROBLEMS MADE IT FOR YOU TO DO YOUR WORK, TAKE CARE OF THINGS AT HOME, OR GET ALONG WITH OTHER PEOPLE: NOT DIFFICULT AT ALL
3. WORRYING TOO MUCH ABOUT DIFFERENT THINGS: NEARLY EVERY DAY
7. FEELING AFRAID AS IF SOMETHING AWFUL MIGHT HAPPEN: SEVERAL DAYS
GAD7 TOTAL SCORE: 10
5. BEING SO RESTLESS THAT IT IS HARD TO SIT STILL: NOT AT ALL
1. FEELING NERVOUS, ANXIOUS, OR ON EDGE: SEVERAL DAYS

## 2024-05-01 NOTE — PROGRESS NOTES
"Kirti is a 69 year old who is being evaluated via a billable video visit.    How would you like to obtain your AVS? MyChart  If the video visit is dropped, the invitation should be resent by: Logging into My Chart.  Will anyone else be joining your video visit? No      Assessment & Plan     Anxiety  Recommend as needed medication. Patient to let us know if there are more symptoms.   - hydrOXYzine HCl (ATARAX) 25 MG tablet  Dispense: 60 tablet; Refill: 1            MED REC REQUIREDPost Medication Reconciliation Status: discharge medications reconciled, continue medications without change  BMI  Estimated body mass index is 25.83 kg/m  as calculated from the following:    Height as of 4/26/24: 1.778 m (5' 10\").    Weight as of 4/26/24: 81.6 kg (180 lb).         FUTURE APPOINTMENTS:       - Follow-up visit as needed.    Subjective   Kirti is a 69 year old, presenting for the following health issues:    Patient is a 69-year-old female who comes in for an emergency room follow-up through a virtual appointment.  She was seen for severe rigors which happened all of a sudden a few weeks ago.  She had a complete workup done which was of low yield.  She said she had some bad news about her granddaughter earlier in the day and wonders if this was a panic attack.  She does have underlying anxiety but has never had a severe panic attack like the other day.  She has not had any episodes since then.  We talked a bit about this possibly being a panic attack and I recommended medication that she can take as needed which she was open to.  She denies any other systemic symptoms at this time.  ER F/U (Follow up from the ER on 4-26-24.) and Medication to add (She is taking Miralax daily.)        5/1/2024     3:42 PM   Additional Questions   Roomed by Yara Gerber CMA     Video Start Time:  5:00PM    [facility]     ED/UC Followup:    Facility:  [facility] ER  Date of visit: 4-  Reason for visit: Fever, had an episode of " rigors that lasted one hour prior to coming to the ER.  Current Status: The night of the ER visit- she was just going to bed and was not asleep yet.  She had rigors which lasted one hour and that is what brought her to the ER.  Fever is normal now.  Had been feeling anxious in the days prior to the ER visit.  Friday am she received bad news about her granddaughter.  She kept herself busy throughout that day and when going to bed that night is when she had the  symptoms.  Just that one possible panic attack she states and she is not even sure if that is what it was.  Wants to talk to the MD about that.  Has not had rigors since the ER visit.     5/1/2024  3:49 PM   PHQ-9 ([employer])    1.  Little interest or pleasure in doing things 0    2.  Feeling down, depressed, or hopeless 0    3.  Trouble falling or staying asleep, or sleeping too much 1    4.  Feeling tired or having little energy 1    5.  Poor appetite or overeating 0    6.  Feeling bad about yourself - or that you are a failure or have let yourself or your family down 0    7.  Trouble concentrating on things, such as reading the newspaper or watching television 1    8.  Moving or speaking so slowly that other people could have noticed. Or the opposite - being so fidgety or restless that you have been moving around a lot more than usual 0    9.  Thoughts that you would be better off dead, or of hurting yourself in some way 0    PHQ-9 Total Score 3    If you checked off any problems, how difficult have these problems made it for you to do your work, take care of things at home, or get along with other people? Not difficult at all    6.  Feeling bad about yourself 0    7.  Trouble concentrating 1    8.  Moving slowly or restless 0    9.  Suicidal or self-harm thoughts 0    Difficulty at work, home, or with people Not difficult at all         5/1/2024  3:49 PM   KWAKU-7   [employer], 2002; Used with Permission)    1. Feeling nervous, anxious, or on edge 1    2. Not  being able to stop or control worrying 3    3. Worrying too much about different things 3    4. Trouble relaxing 1    5. Being so restless that it is hard to sit still 0    6. Becoming easily annoyed or irritable 1    7. Feeling afraid, as if something awful might happen 1    KWAKU-7 Total Score 10    If you checked any problems, how difficult have they made it for you to do your work, take care of things at home, or get along with other people? Not difficult at all                Review of Systems  CONSTITUTIONAL: NEGATIVE for fever, chills, change in weight  INTEGUMENTARY/SKIN: NEGATIVE for worrisome rashes, moles or lesions  ENT/MOUTH: NEGATIVE for ear, mouth and throat problems  RESP: NEGATIVE for significant cough or SOB  CV: NEGATIVE for chest pain, palpitations or peripheral edema  GI: NEGATIVE for nausea, abdominal pain, heartburn, or change in bowel habits  MUSCULOSKELETAL: NEGATIVE for significant arthralgias or myalgia  NEURO: NEGATIVE for weakness, dizziness or paresthesias  ENDOCRINE: NEGATIVE for temperature intolerance, skin/hair changes  HEME/ALLERGY/IMMUNE: NEGATIVE for bleeding problems  PSYCHIATRIC: POSITIVE for and anxiety      Objective    Vitals - Patient Reported  Pain Score: No Pain (0)        Physical Exam   GENERAL: alert and no distress  EYES: Eyes grossly normal to inspection.  No discharge or erythema, or obvious scleral/conjunctival abnormalities.  RESP: No audible wheeze, cough, or visible cyanosis.    SKIN: Visible skin clear. No significant rash, abnormal pigmentation or lesions.  NEURO: Cranial nerves grossly intact.  Mentation and speech appropriate for age.  PSYCH: Appropriate affect, tone, and pace of words          Video-Visit Details    Type of service:  Video Visit   Video End Time:5:16 PM  Originating Location (pt. Location): Home    Distant Location (provider location):  Off-site  Platform used for Video Visit: Helga  Signed Electronically by: Verito Ashby MD

## 2024-07-18 ENCOUNTER — OFFICE VISIT (OUTPATIENT)
Dept: DERMATOLOGY | Facility: CLINIC | Age: 69
End: 2024-07-18
Payer: MEDICARE

## 2024-07-18 DIAGNOSIS — L90.0 LICHEN SCLEROSUS: ICD-10-CM

## 2024-07-18 DIAGNOSIS — Z85.828 HISTORY OF BASAL CELL CANCER: ICD-10-CM

## 2024-07-18 DIAGNOSIS — D18.01 CHERRY ANGIOMA: ICD-10-CM

## 2024-07-18 DIAGNOSIS — L82.1 SEBORRHEIC KERATOSIS: ICD-10-CM

## 2024-07-18 DIAGNOSIS — L81.4 LENTIGO: ICD-10-CM

## 2024-07-18 DIAGNOSIS — D22.9 MULTIPLE BENIGN NEVI: Primary | ICD-10-CM

## 2024-07-18 PROCEDURE — 99213 OFFICE O/P EST LOW 20 MIN: CPT | Performed by: PHYSICIAN ASSISTANT

## 2024-07-18 RX ORDER — CLOBETASOL PROPIONATE 0.5 MG/G
OINTMENT TOPICAL 2 TIMES DAILY
Qty: 30 G | Refills: 5 | Status: CANCELLED | OUTPATIENT
Start: 2024-07-18

## 2024-07-18 ASSESSMENT — PAIN SCALES - GENERAL: PAINLEVEL: NO PAIN (0)

## 2024-07-18 NOTE — LETTER
7/18/2024      Ina Otoole  [address]  [facility] [address]      Dear Colleague,    Thank you for referring your patient, Ina Otoole, to the [facility]. Please see a copy of my visit note below.    Ina Otoole is a pleasant 69 year old year old female patient here today for recheck lichen sclerosus and skincheck. She notes lichen sclerosus is well controlled with clobetasol twice weekly. She denies any changing nevi or skin lesions..  Remainder of the HPI, Meds, PMH, Allergies, FH, and SH was reviewed in chart.    History of BCC  Past Medical History:   Diagnosis Date     Arthritis 2018     Basal cell carcinoma      Calculus of gallbladder with other cholecystitis, without mention of obstruction 06/07/2011     Cancer (H) Nov 2022    Basal right leg     Goiter        Past Surgical History:   Procedure Laterality Date     ABDOMEN SURGERY       ARTHROSCOPY KNEE      left meniscus repair     ARTHROSCOPY KNEE RT/LT      left - meniscus repair     COLONOSCOPY N/A 08/24/2017    Procedure: COLONOSCOPY;  Colonoscopy  ;  Surgeon: Abhishek Escobedo MD;  Location: [facility]     COLONOSCOPY       COLONOSCOPY N/A 6/14/2023    Procedure: COLONOSCOPY, FLEXIBLE, WITH LESION REMOVAL USING SNARE;  Surgeon: Conner Paul DO;  Location: [facility]     KNEE SURGERY  09/2013    left knee replacement     LAPAROSCOPIC CHOLECYSTECTOMY  06/29/2011    Procedure:LAPAROSCOPIC CHOLECYSTECTOMY; Surgeon:SUMAYA ZAMORANO; Location:[facility]     LAPAROSCOPIC CHOLECYSTECTOMY       REPLACEMENT TOTAL KNEE Right 08/20/2018    Procedure: RIGHT TOTAL KNEE ARTHROPLASTY COMPUTER ASSIST;  Surgeon: Kevin Haji MD;  Location: [facility] OR;  Service:      STONE ANALYSIS  2011     THYROID BIOPSY       TOTAL KNEE ARTHROPLASTY Left      TUBAL LIGATION  1980     VASCULAR SURGERY  2018        Family History   Problem Relation Age of Onset     Diabetes Mother      Hypertension Mother      Cerebrovascular  Disease Mother      Cancer Mother         melanoma     Dementia Mother      Basal cell carcinoma Mother      Squamous cell carcinoma Mother      Respiratory Father         copd     Cancer - colorectal Father      Bronchitis Father      Hypertension Sister      Multiple Sclerosis Sister      Neurologic Disorder Sister         ms     Diabetes Brother      Hypertension Brother      Breast Cancer Maternal Grandmother      Diabetes Paternal Grandmother      Other - See Comments Daughter         Transverse Myelitis     Hypertension Sister      Asthma No family hx of      Prostate Cancer No family hx of        Social History     Socioeconomic History     Marital status:      Spouse name: Not on file     Number of children: Not on file     Years of education: Not on file     Highest education level: Not on file   Occupational History     Not on file   Tobacco Use     Smoking status: Former     Current packs/day: 0.00     Types: Cigarettes     Quit date: 2000     Years since quittin.4     Smokeless tobacco: Never   Vaping Use     Vaping status: Never Used   Substance and Sexual Activity     Alcohol use: Yes     Comment: 10 times a year..     Drug use: No     Sexual activity: Never   Other Topics Concern     Parent/sibling w/ CABG, MI or angioplasty before 65F 55M? No   Social History Narrative    Working at [employer]  [employer]    .      2 children    2 grandchild     Social Determinants of Health     Financial Resource Strain: Low Risk  (3/25/2024)    Financial Resource Strain      Within the past 12 months, have you or your family members you live with been unable to get utilities (heat, electricity) when it was really needed?: No   Food Insecurity: Low Risk  (3/25/2024)    Food Insecurity      Within the past 12 months, did you worry that your food would run out before you got money to buy more?: No      Within the past 12 months, did the food you bought just not last and you didn t  have money to get more?: No   Transportation Needs: Low Risk  (3/25/2024)    Transportation Needs      Within the past 12 months, has lack of transportation kept you from medical appointments, getting your medicines, non-medical meetings or appointments, work, or from getting things that you need?: No   Physical Activity: Insufficiently Active (3/25/2024)    Exercise Vital Sign      Days of Exercise per Week: 4 days      Minutes of Exercise per Session: 30 min   Stress: No Stress Concern Present (3/25/2024)    [place] [employer] - Occupational Stress Questionnaire      Feeling of Stress : Only a little   Social Connections: Unknown (3/25/2024)    Social Connection and Isolation Panel [NHANES]      Frequency of Communication with Friends and Family: Not on file      Frequency of Social Gatherings with Friends and Family: Twice a week      Attends Synagogue Services: Not on file      Active Member of Clubs or Organizations: Not on file      Attends Club or Organization Meetings: Not on file      Marital Status: Not on file   Interpersonal Safety: Low Risk  (4/1/2024)    Interpersonal Safety      Do you feel physically and emotionally safe where you currently live?: Yes      Within the past 12 months, have you been hit, slapped, kicked or otherwise physically hurt by someone?: No      Within the past 12 months, have you been humiliated or emotionally abused in other ways by your partner or ex-partner?: No   Housing Stability: Low Risk  (3/25/2024)    Housing Stability      Do you have housing? : Yes      Are you worried about losing your housing?: No       Outpatient Encounter Medications as of 7/18/2024   Medication Sig Dispense Refill     aspirin (ASA) 325 MG tablet Take 1 tablet (325 mg) by mouth daily 90 tablet 0     Cholecalciferol (VITAMIN D3 PO) Take 25 mcg by mouth daily       clobetasol (TEMOVATE) 0.05 % external ointment Apply topically 2 times daily 30 g 5     famotidine (PEPCID) 20 MG  tablet Take 1 tablet (20 mg) by mouth 2 times daily 30 tablet 6     hydrOXYzine HCl (ATARAX) 25 MG tablet Take 1 tablet (25 mg) by mouth 3 times daily as needed for anxiety 60 tablet 1     Ibuprofen (ADVIL PO) Take 400 mg by mouth 2 times daily       lactobacillus rhamnosus, GG, (CULTURELL) capsule Take 1 capsule by mouth 2 times daily       multivitamin (ONE-DAILY) tablet Take 1 tablet by mouth daily Also has Vitamin D 25 mcg in the vitamin.       senna-docusate (SENOKOT-S;PERICOLACE) 8.6-50 MG per tablet Take 2 tablets by mouth 2 times daily        No facility-administered encounter medications on file as of 7/18/2024.             O:   NAD, WDWN, Alert & Oriented, Mood & Affect wnl, Vitals stable   Here today alone   There were no vitals taken for this visit.   General appearance normal   Vitals stable   Alert, oriented and in no acute distress     Labia mucosa improved, mild atrophy  Brown stuck on papules and brown macules on torso and extremities  Brown papule and macules with regular pigment network and borders on torso and extremities  Red papules on torso     Eyes: Conjunctivae/lids:Normal     ENT: Lips: normal    MSK:Normal    Pulm: Breathing Normal    Neuro/Psych: Orientation:Alert and Orientedx3 ; Mood/Affect:normal  A/P:  1. Early lichen sclerosus   Well controlled on clobetasol, continue  2-3 times weekly.   Use aquaphor or vaseline daily.   Use mild cleanser like cerave, cetaphil, or dove sensitive skin cleanser.     2. Seborrheic keratosis, lentigo, cherry angioma, benign nevi, history of BCC  BENIGN LESIONS DISCUSSED WITH PATIENT:  I discussed the specifics of tumor, prognosis, and genetics of benign lesions.  I explained that treatment of these lesions would be purely cosmetic and not medically neccessary.  I discussed with patient different removal options including excision, cautery and /or laser.      Nature and genetics of benign skin lesions dicussed with patient.  Signs and Symptoms of skin  cancer discussed with patient.  ABCDEs of melanoma reviewed with patient.  Patient encouraged to perform monthly skin exams.  UV precautions reviewed with patient.  Risks of non-melanoma skin cancer discussed with patient   Return to clinic in one year or sooner if needed.       Again, thank you for allowing me to participate in the care of your patient.        Sincerely,        Tiffany Reid PA-C

## 2024-07-18 NOTE — NURSING NOTE
Chief Complaint   Patient presents with    Skin Check     Spots on back, right legs and left cheek        There were no vitals filed for this visit.  Wt Readings from Last 1 Encounters:   04/26/24 81.6 kg (180 lb)       Celi Thakur LPN .................7/18/2024

## 2024-07-20 NOTE — PROGRESS NOTES
Ina Otoole is a pleasant 69 year old year old female patient here today for recheck lichen sclerosus and skincheck. She notes lichen sclerosus is well controlled with clobetasol twice weekly. She denies any changing nevi or skin lesions..  Remainder of the HPI, Meds, PMH, Allergies, FH, and SH was reviewed in chart.    History of BCC  Past Medical History:   Diagnosis Date    Arthritis 2018    Basal cell carcinoma     Calculus of gallbladder with other cholecystitis, without mention of obstruction 06/07/2011    Cancer (H) Nov 2022    Basal right leg    Goiter        Past Surgical History:   Procedure Laterality Date    ABDOMEN SURGERY      ARTHROSCOPY KNEE      left meniscus repair    ARTHROSCOPY KNEE RT/LT      left - meniscus repair    COLONOSCOPY N/A 08/24/2017    Procedure: COLONOSCOPY;  Colonoscopy  ;  Surgeon: Abhishek Escobedo MD;  Location: [facility]    COLONOSCOPY      COLONOSCOPY N/A 6/14/2023    Procedure: COLONOSCOPY, FLEXIBLE, WITH LESION REMOVAL USING SNARE;  Surgeon: Conner Paul DO;  Location: [facility]    KNEE SURGERY  09/2013    left knee replacement    LAPAROSCOPIC CHOLECYSTECTOMY  06/29/2011    Procedure:LAPAROSCOPIC CHOLECYSTECTOMY; Surgeon:SUMAYA ZAMORANO; Location:[facility]    LAPAROSCOPIC CHOLECYSTECTOMY      REPLACEMENT TOTAL KNEE Right 08/20/2018    Procedure: RIGHT TOTAL KNEE ARTHROPLASTY COMPUTER ASSIST;  Surgeon: Kevin Haji MD;  Location: [facility];  Service:     STONE ANALYSIS  2011    THYROID BIOPSY      TOTAL KNEE ARTHROPLASTY Left     TUBAL LIGATION  1980    VASCULAR SURGERY  2018        Family History   Problem Relation Age of Onset    Diabetes Mother     Hypertension Mother     Cerebrovascular Disease Mother     Cancer Mother         melanoma    Dementia Mother     Basal cell carcinoma Mother     Squamous cell carcinoma Mother     Respiratory Father         copd    Cancer - colorectal Father     Bronchitis Father     Hypertension Sister     Multiple  Sclerosis Sister     Neurologic Disorder Sister         ms    Diabetes Brother     Hypertension Brother     Breast Cancer Maternal Grandmother     Diabetes Paternal Grandmother     Other - See Comments Daughter         Transverse Myelitis    Hypertension Sister     Asthma No family hx of     Prostate Cancer No family hx of        Social History     Socioeconomic History    Marital status:      Spouse name: Not on file    Number of children: Not on file    Years of education: Not on file    Highest education level: Not on file   Occupational History    Not on file   Tobacco Use    Smoking status: Former     Current packs/day: 0.00     Types: Cigarettes     Quit date: 2000     Years since quittin.4    Smokeless tobacco: Never   Vaping Use    Vaping status: Never Used   Substance and Sexual Activity    Alcohol use: Yes     Comment: 10 times a year..    Drug use: No    Sexual activity: Never   Other Topics Concern    Parent/sibling w/ CABG, MI or angioplasty before 65F 55M? No   Social History Narrative    Working at [employer]  [employer]    .      2 children    2 grandchild     Social Determinants of Health     Financial Resource Strain: Low Risk  (3/25/2024)    Financial Resource Strain     Within the past 12 months, have you or your family members you live with been unable to get utilities (heat, electricity) when it was really needed?: No   Food Insecurity: Low Risk  (3/25/2024)    Food Insecurity     Within the past 12 months, did you worry that your food would run out before you got money to buy more?: No     Within the past 12 months, did the food you bought just not last and you didn t have money to get more?: No   Transportation Needs: Low Risk  (3/25/2024)    Transportation Needs     Within the past 12 months, has lack of transportation kept you from medical appointments, getting your medicines, non-medical meetings or appointments, work, or from getting things that you  need?: No   Physical Activity: Insufficiently Active (3/25/2024)    Exercise Vital Sign     Days of Exercise per Week: 4 days     Minutes of Exercise per Session: 30 min   Stress: No Stress Concern Present (3/25/2024)    Citizen [employer] [place] [employer] - Occupational Stress Questionnaire     Feeling of Stress : Only a little   Social Connections: Unknown (3/25/2024)    Social Connection and Isolation Panel [NHANES]     Frequency of Communication with Friends and Family: Not on file     Frequency of Social Gatherings with Friends and Family: Twice a week     Attends Caodaism Services: Not on file     Active Member of Clubs or Organizations: Not on file     Attends Club or Organization Meetings: Not on file     Marital Status: Not on file   Interpersonal Safety: Low Risk  (4/1/2024)    Interpersonal Safety     Do you feel physically and emotionally safe where you currently live?: Yes     Within the past 12 months, have you been hit, slapped, kicked or otherwise physically hurt by someone?: No     Within the past 12 months, have you been humiliated or emotionally abused in other ways by your partner or ex-partner?: No   Housing Stability: Low Risk  (3/25/2024)    Housing Stability     Do you have housing? : Yes     Are you worried about losing your housing?: No       Outpatient Encounter Medications as of 7/18/2024   Medication Sig Dispense Refill    aspirin (ASA) 325 MG tablet Take 1 tablet (325 mg) by mouth daily 90 tablet 0    Cholecalciferol (VITAMIN D3 PO) Take 25 mcg by mouth daily      clobetasol (TEMOVATE) 0.05 % external ointment Apply topically 2 times daily 30 g 5    famotidine (PEPCID) 20 MG tablet Take 1 tablet (20 mg) by mouth 2 times daily 30 tablet 6    hydrOXYzine HCl (ATARAX) 25 MG tablet Take 1 tablet (25 mg) by mouth 3 times daily as needed for anxiety 60 tablet 1    Ibuprofen (ADVIL PO) Take 400 mg by mouth 2 times daily      lactobacillus rhamnosus, GG, (CULTURELL) capsule Take 1 capsule  by mouth 2 times daily      multivitamin (ONE-DAILY) tablet Take 1 tablet by mouth daily Also has Vitamin D 25 mcg in the vitamin.      senna-docusate (SENOKOT-S;PERICOLACE) 8.6-50 MG per tablet Take 2 tablets by mouth 2 times daily        No facility-administered encounter medications on file as of 7/18/2024.             O:   NAD, WDWN, Alert & Oriented, Mood & Affect wnl, Vitals stable   Here today alone   There were no vitals taken for this visit.   General appearance normal   Vitals stable   Alert, oriented and in no acute distress     Labia mucosa improved, mild atrophy  Brown stuck on papules and brown macules on torso and extremities  Brown papule and macules with regular pigment network and borders on torso and extremities  Red papules on torso     Eyes: Conjunctivae/lids:Normal     ENT: Lips: normal    MSK:Normal    Pulm: Breathing Normal    Neuro/Psych: Orientation:Alert and Orientedx3 ; Mood/Affect:normal  A/P:  1. Early lichen sclerosus   Well controlled on clobetasol, continue  2-3 times weekly.   Use aquaphor or vaseline daily.   Use mild cleanser like cerave, cetaphil, or dove sensitive skin cleanser.     2. Seborrheic keratosis, lentigo, cherry angioma, benign nevi, history of BCC  BENIGN LESIONS DISCUSSED WITH PATIENT:  I discussed the specifics of tumor, prognosis, and genetics of benign lesions.  I explained that treatment of these lesions would be purely cosmetic and not medically neccessary.  I discussed with patient different removal options including excision, cautery and /or laser.      Nature and genetics of benign skin lesions dicussed with patient.  Signs and Symptoms of skin cancer discussed with patient.  ABCDEs of melanoma reviewed with patient.  Patient encouraged to perform monthly skin exams.  UV precautions reviewed with patient.  Risks of non-melanoma skin cancer discussed with patient   Return to clinic in one year or sooner if needed.

## 2024-08-09 ENCOUNTER — TRANSFERRED RECORDS (OUTPATIENT)
Dept: HEALTH INFORMATION MANAGEMENT | Facility: CLINIC | Age: 69
End: 2024-08-09
Payer: MEDICARE

## 2024-10-21 ENCOUNTER — TRANSFERRED RECORDS (OUTPATIENT)
Dept: HEALTH INFORMATION MANAGEMENT | Facility: CLINIC | Age: 69
End: 2024-10-21
Payer: MEDICARE

## 2024-11-25 ENCOUNTER — TRANSFERRED RECORDS (OUTPATIENT)
Dept: HEALTH INFORMATION MANAGEMENT | Facility: CLINIC | Age: 69
End: 2024-11-25
Payer: MEDICARE

## 2025-01-30 ENCOUNTER — TRANSFERRED RECORDS (OUTPATIENT)
Dept: HEALTH INFORMATION MANAGEMENT | Facility: CLINIC | Age: 70
End: 2025-01-30

## 2025-02-20 ENCOUNTER — OFFICE VISIT (OUTPATIENT)
Dept: FAMILY MEDICINE | Facility: CLINIC | Age: 70
End: 2025-02-20
Payer: MEDICARE

## 2025-02-20 VITALS
WEIGHT: 186 LBS | RESPIRATION RATE: 16 BRPM | DIASTOLIC BLOOD PRESSURE: 74 MMHG | SYSTOLIC BLOOD PRESSURE: 104 MMHG | HEART RATE: 76 BPM | TEMPERATURE: 97.7 F | OXYGEN SATURATION: 98 % | BODY MASS INDEX: 27.55 KG/M2 | HEIGHT: 69 IN

## 2025-02-20 DIAGNOSIS — Z01.818 PREOP GENERAL PHYSICAL EXAM: Primary | ICD-10-CM

## 2025-02-20 DIAGNOSIS — H26.9 CATARACT OF BOTH EYES, UNSPECIFIED CATARACT TYPE: ICD-10-CM

## 2025-02-20 RX ORDER — MAGNESIUM OXIDE 400 MG/1
400 TABLET ORAL DAILY
COMMUNITY
Start: 2024-10-23

## 2025-02-20 RX ORDER — BISACODYL 5 MG/1
5 TABLET, DELAYED RELEASE ORAL DAILY PRN
COMMUNITY
Start: 2025-01-22

## 2025-02-20 ASSESSMENT — PAIN SCALES - GENERAL: PAINLEVEL_OUTOF10: MODERATE PAIN (4)

## 2025-02-20 NOTE — PROGRESS NOTES
Preoperative Evaluation  [facility]  [address] [facility] [address]  Phone: [phone]  Primary Provider: Verito Ashby MD  Pre-op Performing Provider: Verito Ashby MD  Feb 20, 2025             2/15/2025   Surgical Information   What procedure is being done? Cataract surgery   Facility or Hospital where procedure/surgery will be performed: [facility].   Who is doing the procedure / surgery? Arleen Chang MD   Date of surgery / procedure: Left eye on 3-5-25.... Right eye on 3-19-25   Time of surgery / procedure: They will call with the time 24 to 48 hrs before   Where do you plan to recover after surgery? at home with family     Fax number for surgical facility: [phone]    Assessment & Plan     The proposed surgical procedure is considered LOW risk.    Problem List Items Addressed This Visit    None  Visit Diagnoses       Preop general physical exam    -  Primary    Cataract of both eyes, unspecified cataract type              69 yr old female here for a pre op evaluation. Cleared for surgery.          - No identified additional risk factors other than previously addressed         Recommendation  Approval given to proceed with proposed procedure, without further diagnostic evaluation.    Keke Howardgy is a 69 year old, presenting for the following:  Pre-Op Exam (Pre-op physical.) and Imm/Inj (Declined Flu and Covid injections today.)          2/20/2025    10:11 AM   Additional Questions   Roomed by NORBERTO Dozier related to upcoming procedure: Patient is a 69 yr old female here for a pre op evaluation. She is having rich cataract surgery.   Patient has a history of IBS and is working with GI for management for this. Otherwise quite healthy. She is able to perform her ADLS without any difficulties.   No other symptoms reported.         2/15/2025   Pre-Op Questionnaire   Have you ever had a heart  attack or stroke? No   Have you ever had surgery on your heart or blood vessels, such as a stent placement, a coronary artery bypass, or surgery on an artery in your head, neck, heart, or legs? No   Do you have chest pain with activity? No   Do you have a history of heart failure? No   Do you currently have a cold, bronchitis or symptoms of other infection? No   Do you have a cough, shortness of breath, or wheezing? No   Do you or anyone in your family have previous history of blood clots? (!) YES    Do you or does anyone in your family have a serious bleeding problem such as prolonged bleeding following surgeries or cuts? No   Have you ever had problems with anemia or been told to take iron pills? No   Have you had any abnormal blood loss such as black, tarry or bloody stools, or abnormal vaginal bleeding? No   Have you ever had a blood transfusion? No   Are you willing to have a blood transfusion if it is medically needed before, during, or after your surgery? Yes   Have you or any of your relatives ever had problems with anesthesia? (!) YES    Do you have sleep apnea, excessive snoring or daytime drowsiness? No   Do you have any artifical heart valves or other implanted medical devices like a pacemaker, defibrillator, or continuous glucose monitor? No   Do you have artificial joints? (!) YES   Are you allergic to latex? No     Health Care Directive  Patient has a Health Care Directive on file      Preoperative Review of    reviewed - no record of controlled substances prescribed.      Status of Chronic Conditions:  See problem list for active medical problems.  Problems all longstanding and stable, except as noted/documented.  See ROS for pertinent symptoms related to these conditions.    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   Went [facility] [place], had botox injections into trigger points, went to pain clinic, PT.  Never had any improvement.    Problem list name updated by automated process. Provider to review      Goiter      Priority: Medium     Had aspiration, was on replacement for about a year, hasn't needed since.    Problem list name updated by automated process. Provider to review      Esophageal reflux 02/07/2007     Priority: Medium     Was given script for PPI, but couldn't afford, has been using tums and zantac        Past Medical History:   Diagnosis Date    Arthritis 2018    Basal cell carcinoma     Calculus of gallbladder with other cholecystitis, without mention of obstruction 06/07/2011    Cancer (H) Nov 2022    Basal right leg    Goiter      Past Surgical History:   Procedure Laterality Date    ABDOMEN SURGERY      ARTHROSCOPY KNEE      left meniscus repair    ARTHROSCOPY KNEE RT/LT      left - meniscus repair    COLONOSCOPY N/A 08/24/2017    Procedure: COLONOSCOPY;  Colonoscopy  ;  Surgeon: Abhishek Escobedo MD;  Location: [facility]    COLONOSCOPY      COLONOSCOPY N/A 6/14/2023    Procedure: COLONOSCOPY, FLEXIBLE, WITH LESION REMOVAL USING SNARE;  Surgeon: Conner Paul DO;  Location: [facility]    KNEE SURGERY  09/2013    left knee replacement    LAPAROSCOPIC CHOLECYSTECTOMY  06/29/2011    Procedure:LAPAROSCOPIC CHOLECYSTECTOMY; Surgeon:SUMAYA ZAMORANO; Location:[facility]    LAPAROSCOPIC CHOLECYSTECTOMY      REPLACEMENT TOTAL KNEE Right 08/20/2018    Procedure: RIGHT TOTAL KNEE ARTHROPLASTY COMPUTER ASSIST;  Surgeon: Kevin Haji,  MD;  Location: [facility] OR;  Service:     STONE ANALYSIS      THYROID BIOPSY      TOTAL KNEE ARTHROPLASTY Left     TUBAL LIGATION      VASCULAR SURGERY  2018     Current Outpatient Medications   Medication Sig Dispense Refill    aspirin (ASA) 325 MG tablet Take 1 tablet (325 mg) by mouth daily 90 tablet 0    bisacodyl (DULCOLAX) 5 MG EC tablet Take 5 mg by mouth daily as needed for constipation.      Cholecalciferol (VITAMIN D3 PO) Take 25 mcg by mouth daily      clobetasol (TEMOVATE) 0.05 % external ointment Apply topically 2 times daily 30 g 5    famotidine (PEPCID) 20 MG tablet Take 1 tablet (20 mg) by mouth 2 times daily 30 tablet 6    hydrOXYzine HCl (ATARAX) 25 MG tablet Take 1 tablet (25 mg) by mouth 3 times daily as needed for anxiety 60 tablet 1    Ibuprofen (ADVIL PO) Take 400 mg by mouth 2 times daily      lactobacillus rhamnosus, GG, (CULTURELL) capsule Take 1 capsule by mouth 2 times daily      linaclotide (LINZESS) 290 MCG capsule Take 290 mcg by mouth every morning (before breakfast).      magnesium oxide (MAG-OX) 400 MG tablet Take 400 mg by mouth daily. Taking 1200 mg daily.      multivitamin (ONE-DAILY) tablet Take 1 tablet by mouth daily Also has Vitamin D 25 mcg in the vitamin.      senna-docusate (SENOKOT-S;PERICOLACE) 8.6-50 MG per tablet Take 2 tablets by mouth 2 times daily          Allergies   Allergen Reactions    Sulfa Antibiotics Hives        Social History     Tobacco Use    Smoking status: Former     Current packs/day: 0.00     Types: Cigarettes     Quit date: 2000     Years since quittin.0    Smokeless tobacco: Never   Substance Use Topics    Alcohol use: Yes     Comment: 10 times a year..     Family History   Problem Relation Age of Onset    Diabetes Mother     Hypertension Mother     Cerebrovascular Disease Mother     Cancer Mother         melanoma    Dementia Mother     Basal cell carcinoma Mother     Squamous cell carcinoma Mother     Respiratory Father   "       copd    Cancer - colorectal Father     Bronchitis Father     Hypertension Sister     Multiple Sclerosis Sister     Neurologic Disorder Sister         ms    Diabetes Brother     Hypertension Brother     Breast Cancer Maternal Grandmother     Diabetes Paternal Grandmother     Other - See Comments Daughter         Transverse Myelitis    Hypertension Sister     Asthma No family hx of     Prostate Cancer No family hx of      History   Drug Use No             Review of Systems  CONSTITUTIONAL: NEGATIVE for fever, chills, change in weight  INTEGUMENTARY/SKIN: NEGATIVE for worrisome rashes, moles or lesions  EYES: POSITIVE for blurred vision bilateral and blurred vision bilateral  ENT/MOUTH: NEGATIVE for ear, mouth and throat problems  RESP: NEGATIVE for significant cough or SOB  CV: NEGATIVE for chest pain, palpitations or peripheral edema  MUSCULOSKELETAL: NEGATIVE for significant arthralgias or myalgia  NEURO: NEGATIVE for weakness, dizziness or paresthesias  ENDOCRINE: NEGATIVE for temperature intolerance, skin/hair changes  HEME/ALLERGY/IMMUNE: NEGATIVE for bleeding problems  PSYCHIATRIC: NEGATIVE for changes in mood or affect    Objective    /74 (BP Location: Right arm, Patient Position: Chair, Cuff Size: Adult Large)   Pulse 76   Temp 97.7  F (36.5  C) (Tympanic)   Resp 16   Ht 1.759 m (5' 9.25\")   Wt 84.4 kg (186 lb)   SpO2 98%   BMI 27.27 kg/m     Estimated body mass index is 27.27 kg/m  as calculated from the following:    Height as of this encounter: 1.759 m (5' 9.25\").    Weight as of this encounter: 84.4 kg (186 lb).  Physical Exam  GENERAL: alert and no distress  EYES: Eyes grossly normal to inspection, PERRL and conjunctivae and sclerae normal  HENT: ear canals and TM's normal, nose and mouth without ulcers or lesions  NECK: no adenopathy, no asymmetry, masses, or scars  RESP: lungs clear to auscultation - no rales, rhonchi or wheezes  CV: regular rate and rhythm, normal S1 S2, no S3 or " S4, no murmur, click or rub, no peripheral edema  ABDOMEN: soft, nontender, no hepatosplenomegaly, no masses and bowel sounds normal  MS: no gross musculoskeletal defects noted, no edema    Recent Labs   Lab Test 04/27/24  0007   HGB 13.6         POTASSIUM 3.8   CR 0.85        Diagnostics  No labs were ordered during this visit.   No EKG required for low risk surgery (cataract, skin procedure, breast biopsy, etc).    Revised Cardiac Risk Index (RCRI)  The patient has the following serious cardiovascular risks for perioperative complications:   - No serious cardiac risks = 0 points     RCRI Interpretation: 0 points: Class I (very low risk - 0.4% complication rate)         Signed Electronically by: Verito Ashby MD  A copy of this evaluation report is provided to the requesting physician.

## 2025-03-29 SDOH — HEALTH STABILITY: PHYSICAL HEALTH: ON AVERAGE, HOW MANY DAYS PER WEEK DO YOU ENGAGE IN MODERATE TO STRENUOUS EXERCISE (LIKE A BRISK WALK)?: 6 DAYS

## 2025-03-29 SDOH — HEALTH STABILITY: PHYSICAL HEALTH: ON AVERAGE, HOW MANY MINUTES DO YOU ENGAGE IN EXERCISE AT THIS LEVEL?: 20 MIN

## 2025-03-29 ASSESSMENT — SOCIAL DETERMINANTS OF HEALTH (SDOH): HOW OFTEN DO YOU GET TOGETHER WITH FRIENDS OR RELATIVES?: MORE THAN THREE TIMES A WEEK

## 2025-04-03 ENCOUNTER — OFFICE VISIT (OUTPATIENT)
Dept: FAMILY MEDICINE | Facility: CLINIC | Age: 70
End: 2025-04-03
Payer: MEDICARE

## 2025-04-03 VITALS
SYSTOLIC BLOOD PRESSURE: 106 MMHG | TEMPERATURE: 98.1 F | OXYGEN SATURATION: 99 % | BODY MASS INDEX: 27.7 KG/M2 | RESPIRATION RATE: 16 BRPM | HEIGHT: 69 IN | HEART RATE: 77 BPM | WEIGHT: 187 LBS | DIASTOLIC BLOOD PRESSURE: 72 MMHG

## 2025-04-03 DIAGNOSIS — Z00.00 ENCOUNTER FOR ROUTINE ADULT HEALTH EXAMINATION WITHOUT ABNORMAL FINDINGS: Primary | ICD-10-CM

## 2025-04-03 DIAGNOSIS — Z12.31 VISIT FOR SCREENING MAMMOGRAM: ICD-10-CM

## 2025-04-03 ASSESSMENT — ENCOUNTER SYMPTOMS
CONSTITUTIONAL NEGATIVE: 1
ALLERGIC/IMMUNOLOGIC NEGATIVE: 1
ENDOCRINE NEGATIVE: 1
MUSCULOSKELETAL NEGATIVE: 1
HEMATOLOGIC/LYMPHATIC NEGATIVE: 1
EYES NEGATIVE: 1
CARDIOVASCULAR NEGATIVE: 1
PSYCHIATRIC NEGATIVE: 1
NEUROLOGICAL NEGATIVE: 1
GASTROINTESTINAL NEGATIVE: 1
RESPIRATORY NEGATIVE: 1

## 2025-04-03 ASSESSMENT — PAIN SCALES - GENERAL: PAINLEVEL_OUTOF10: NO PAIN (0)

## 2025-04-03 NOTE — PROGRESS NOTES
"Preventive Care Visit  [facility]  Verito Ashby MD, Family Medicine  Apr 3, 2025      Assessment & Plan   Problem List Items Addressed This Visit    None  Visit Diagnoses       Visit for screening mammogram    -  Primary    Relevant Orders    MA Screening Bilateral w/ Benjamin             Patient has been advised of split billing requirements and indicates understanding: Yes       BMI  Estimated body mass index is 27.62 kg/m  as calculated from the following:    Height as of this encounter: 1.753 m (5' 9\").    Weight as of this encounter: 84.8 kg (187 lb).   Weight management plan: Discussed healthy diet and exercise guidelines    Counseling  Appropriate preventive services were addressed with this patient via screening, questionnaire, or discussion as appropriate for fall prevention, nutrition, physical activity, Tobacco-use cessation, social engagement, weight loss and cognition.  Checklist reviewing preventive services available has been given to the patient.  Reviewed patient's diet, addressing concerns and/or questions.   Discussed possible causes of fatigue. The patient was provided with written information regarding signs of hearing loss.     FUTURE APPOINTMENTS:       - Follow-up visit as needed.      Keke Cotto is a 70 year old, presenting for the following:  Physical (Wellness physical only today.  Has an upcoming appointment in April to address other concerns. ), Blood Draw (She is fasting today for labs.), Updated history (She has bilateral cataract surgery done since her last physical.), and Imm/Inj (Declined the Flu and Covid injections today. )        4/3/2025    11:08 AM   Additional Questions   Roomed by Yara Gerber CMA   Accompanied by Self         HPI    Patient is a 70 yr old female here for her annual physical. She has no significant past medical history and she is relatively healthy.    Colonoscopy - Done in 2023  Mammogram - due  Vaccines- up to " date. Declined Flu and COVID  Overall doing well. No other complaints today.  Chief Complaint   Patient presents with    Physical     Wellness physical only today.  Has an upcoming appointment in April to address other concerns.     Blood Draw     She is fasting today for labs.    Updated history     She has bilateral cataract surgery done since her last physical.    Imm/Inj     Declined the Flu and Covid injections today.             Advance Care Planning  Patient has a Health Care Directive on file  Advance care planning document is on file and is current.      3/29/2025   General Health   How would you rate your overall physical health? (!) FAIR   Feel stress (tense, anxious, or unable to sleep) Only a little   (!) STRESS CONCERN      3/29/2025   Nutrition   Diet: Other   If other, please elaborate: [employer] diet,  following chart from dietician.         3/29/2025   Exercise   Days per week of moderate/strenous exercise 6 days   Average minutes spent exercising at this level 20 min         3/29/2025   Social Factors   Frequency of gathering with friends or relatives More than three times a week   Worry food won't last until get money to buy more No   Food not last or not have enough money for food? No   Do you have housing? (Housing is defined as stable permanent housing and does not include staying ouside in a car, in a tent, in an abandoned building, in an overnight shelter, or couch-surfing.) Yes   Are you worried about losing your housing? No   Lack of transportation? No   Unable to get utilities (heat,electricity)? No         3/29/2025   Fall Risk   Fallen 2 or more times in the past year? No   Trouble with walking or balance? No          3/29/2025   Activities of Daily Living- Home Safety   Needs help with the following daily activites None of the above   Safety concerns in the home None of the above         3/29/2025   Dental   Dentist two times every year? Yes         3/29/2025   Hearing Screening   Hearing  concerns? (!) I NEED TO ASK PEOPLE TO SPEAK UP OR REPEAT THEMSELVES.    (!) IT'S HARD TO FOLLOW A CONVERSATION IN A NOISY RESTAURANT OR CROWDED ROOM.    (!) TROUBLE UNDERSTANDING SOFT OR WHISPERED SPEECH.       Multiple values from one day are sorted in reverse-chronological order         3/29/2025   Driving Risk Screening   Patient/family members have concerns about driving No         3/29/2025   General Alertness/Fatigue Screening   Have you been more tired than usual lately? (!) YES         3/29/2025   Urinary Incontinence Screening   Bothered by leaking urine in past 6 months No           3/25/2024   TB Screening   Were you born outside of the [place]? No           Today's PHQ-2 Score:       2025     1:02 PM   PHQ-2 (  [employer])   Q1: Little interest or pleasure in doing things 0   Q2: Feeling down, depressed or hopeless 0   PHQ-2 Score 0    Q1: Little interest or pleasure in doing things Not at all   Q2: Feeling down, depressed or hopeless Not at all   PHQ-2 Score 0       Patient-reported           3/29/2025   Substance Use   Alcohol more than 3/day or more than 7/wk No   Do you have a current opioid prescription? No   How severe/bad is pain from 1 to 10? 4/10   Do you use any other substances recreationally? No     Social History     Tobacco Use    Smoking status: Former     Current packs/day: 0.00     Types: Cigarettes     Quit date: 2000     Years since quittin.1    Smokeless tobacco: Never   Vaping Use    Vaping status: Never Used   Substance Use Topics    Alcohol use: Yes     Comment: 10 times a year..    Drug use: No           2023   LAST FHS-7 RESULTS   1st degree relative breast or ovarian cancer No   Any relative bilateral breast cancer No   Any male have breast cancer No   Any ONE woman have BOTH breast AND ovarian cancer No   Any woman with breast cancer before 50yrs No   2 or more relatives with breast AND/OR ovarian cancer No   2 or more relatives with breast AND/OR bowel cancer Yes         Mammogram Screening - Mammogram every 1-2 years updated in Health Maintenance based on mutual decision making    ASCVD Risk   The 10-year ASCVD risk score (Roseanna DK, et al., 2019) is: 7.8%    Values used to calculate the score:      Age: 70 years      Sex: Female      Is Non- : No      Diabetic: No      Tobacco smoker: No      Systolic Blood Pressure: 106 mmHg      Is BP treated: No      HDL Cholesterol: 80 mg/dL      Total Cholesterol: 183 mg/dL            Reviewed and updated as needed this visit by Provider                    Past Medical History:   Diagnosis Date    Arthritis 2018    Basal cell carcinoma     Calculus of gallbladder with other cholecystitis, without mention of obstruction 2011    Cancer (H) 2022    Basal right leg    Goiter      Past Surgical History:   Procedure Laterality Date    ABDOMEN SURGERY      ARTHROSCOPY KNEE      left meniscus repair    ARTHROSCOPY KNEE RT/LT      left - meniscus repair    COLONOSCOPY N/A 2017    Procedure: COLONOSCOPY;  Colonoscopy  ;  Surgeon: Abhishek Escobedo MD;  Location: [facility]    COLONOSCOPY      COLONOSCOPY N/A 2023    Procedure: COLONOSCOPY, FLEXIBLE, WITH LESION REMOVAL USING SNARE;  Surgeon: Conner Paul DO;  Location: [facility]    KNEE SURGERY  2013    left knee replacement    LAPAROSCOPIC CHOLECYSTECTOMY  2011    Procedure:LAPAROSCOPIC CHOLECYSTECTOMY; Surgeon:SUMAYA ZAMORANO; Location:[facility]    LAPAROSCOPIC CHOLECYSTECTOMY      REPLACEMENT TOTAL KNEE Right 2018    Procedure: RIGHT TOTAL KNEE ARTHROPLASTY COMPUTER ASSIST;  Surgeon: Kevin Haji MD;  Location: [facility];  Service:     STONE ANALYSIS      THYROID BIOPSY      TOTAL KNEE ARTHROPLASTY Left     TUBAL LIGATION  1980    VASCULAR SURGERY       OB History    Para Term  AB Living   2 2 0 0 0 2   SAB IAB Ectopic Multiple Live Births   0 0 0 0 0      # Outcome Date GA Lbr  Homer/2nd Weight Sex Type Anes PTL Lv   2 Para            1 Para              Labs reviewed in EPIC  BP Readings from Last 3 Encounters:   25 106/72   25 104/74   24 113/71    Wt Readings from Last 3 Encounters:   25 84.8 kg (187 lb)   25 84.4 kg (186 lb)   24 81.6 kg (180 lb)                  Patient Active Problem List   Diagnosis    Esophageal reflux    Myalgia and myositis    Goiter    Status post total left knee replacement    CARDIOVASCULAR SCREENING; LDL GOAL LESS THAN 160    Renal calculus or stone    IBS (irritable bowel syndrome)    Thrombophlebitis leg    Vulvar ulcer     Past Surgical History:   Procedure Laterality Date    ABDOMEN SURGERY      ARTHROSCOPY KNEE      left meniscus repair    ARTHROSCOPY KNEE RT/LT      left - meniscus repair    COLONOSCOPY N/A 2017    Procedure: COLONOSCOPY;  Colonoscopy  ;  Surgeon: Abhishek Escobedo MD;  Location: [facility]    COLONOSCOPY      COLONOSCOPY N/A 2023    Procedure: COLONOSCOPY, FLEXIBLE, WITH LESION REMOVAL USING SNARE;  Surgeon: Conner Paul DO;  Location: [facility]    KNEE SURGERY  2013    left knee replacement    LAPAROSCOPIC CHOLECYSTECTOMY  2011    Procedure:LAPAROSCOPIC CHOLECYSTECTOMY; Surgeon:SUMAYA ZAMORANO; Location:[facility]    LAPAROSCOPIC CHOLECYSTECTOMY      REPLACEMENT TOTAL KNEE Right 2018    Procedure: RIGHT TOTAL KNEE ARTHROPLASTY COMPUTER ASSIST;  Surgeon: Kevin Haji MD;  Location: [facility];  Service:     STONE ANALYSIS      THYROID BIOPSY      TOTAL KNEE ARTHROPLASTY Left     TUBAL LIGATION  1980    VASCULAR SURGERY         Social History     Tobacco Use    Smoking status: Former     Current packs/day: 0.00     Types: Cigarettes     Quit date: 2000     Years since quittin.1    Smokeless tobacco: Never   Substance Use Topics    Alcohol use: Yes     Comment: 10 times a year..     Family History   Problem Relation Age of Onset    Diabetes  Mother     Hypertension Mother     Cerebrovascular Disease Mother     Cancer Mother         melanoma    Dementia Mother     Basal cell carcinoma Mother     Squamous cell carcinoma Mother     Respiratory Father         copd    Cancer - colorectal Father     Bronchitis Father     Hypertension Sister     Multiple Sclerosis Sister     Neurologic Disorder Sister         ms    Diabetes Brother     Hypertension Brother     Breast Cancer Maternal Grandmother     Diabetes Paternal Grandmother     Other - See Comments Daughter         Transverse Myelitis    Hypertension Sister     Asthma No family hx of     Prostate Cancer No family hx of          Current Outpatient Medications   Medication Sig Dispense Refill    aspirin (ASA) 325 MG tablet Take 1 tablet (325 mg) by mouth daily 90 tablet 0    bisacodyl (DULCOLAX) 5 MG EC tablet Take 5 mg by mouth daily as needed for constipation.      Cholecalciferol (VITAMIN D3 PO) Take 25 mcg by mouth daily      clobetasol (TEMOVATE) 0.05 % external ointment Apply topically 2 times daily 30 g 5    famotidine (PEPCID) 20 MG tablet Take 1 tablet (20 mg) by mouth 2 times daily 30 tablet 6    hydrOXYzine HCl (ATARAX) 25 MG tablet Take 1 tablet (25 mg) by mouth 3 times daily as needed for anxiety 60 tablet 1    Ibuprofen (ADVIL PO) Take 400 mg by mouth 2 times daily      lactobacillus rhamnosus, GG, (CULTURELL) capsule Take 1 capsule by mouth 2 times daily      linaclotide (LINZESS) 290 MCG capsule Take 290 mcg by mouth every morning (before breakfast).      magnesium oxide (MAG-OX) 400 MG tablet Take 400 mg by mouth daily. Taking 1200 mg daily.      multivitamin (ONE-DAILY) tablet Take 1 tablet by mouth daily Also has Vitamin D 25 mcg in the vitamin.      senna-docusate (SENOKOT-S;PERICOLACE) 8.6-50 MG per tablet Take 2 tablets by mouth 2 times daily        Allergies   Allergen Reactions    Sulfa Antibiotics Hives     Current providers sharing in care for this patient include:  Patient Care  "Team:  Verito Ashby MD as PCP - General (Family Practice)  Verito Ashby MD as Assigned PCP  Tiffany Zuleta PA-C as Physician Assistant (Dermatology)  Sergey Soto MD as Assigned Surgical Provider  Tiffany Zuleta PA-C as Assigned Dermatology Provider    The following health maintenance items are reviewed in Epic and correct as of today:  Health Maintenance   Topic Date Due    INFLUENZA VACCINE (1) 09/01/2024    COVID-19 Vaccine (1 - 2024-25 season) Never done    ANNUAL REVIEW OF HM ORDERS  04/01/2025    MEDICARE ANNUAL WELLNESS VISIT  04/01/2025    MAMMO SCREENING  04/13/2025    FALL RISK ASSESSMENT  04/03/2026    DTAP/TDAP/TD IMMUNIZATION (4 - Td or Tdap) 10/03/2026    DIABETES SCREENING  04/27/2027    LIPID  01/20/2028    RSV VACCINE (1 - 1-dose 75+ series) 03/09/2028    COLORECTAL CANCER SCREENING  06/14/2028    ADVANCE CARE PLANNING  04/01/2029    DEXA  01/12/2038    HEPATITIS C SCREENING  Completed    PHQ-2 (once per calendar year)  Completed    Pneumococcal Vaccine: 50+ Years  Completed    ZOSTER IMMUNIZATION  Completed    HPV IMMUNIZATION  Aged Out    MENINGITIS IMMUNIZATION  Aged Out         Review of Systems   Constitutional: Negative.    HENT: Negative.     Eyes: Negative.    Respiratory: Negative.     Cardiovascular: Negative.    Gastrointestinal: Negative.    Endocrine: Negative.    Genitourinary: Negative.    Musculoskeletal: Negative.    Skin: Negative.    Allergic/Immunologic: Negative.    Neurological: Negative.    Hematological: Negative.    Psychiatric/Behavioral: Negative.           Objective    Exam  /72 (BP Location: Left arm, Patient Position: Chair, Cuff Size: Adult Large)   Pulse 77   Temp 98.1  F (36.7  C) (Tympanic)   Resp 16   Ht 1.753 m (5' 9\")   Wt 84.8 kg (187 lb)   SpO2 99%   BMI 27.62 kg/m     Estimated body mass index is 27.62 kg/m  as calculated from the following:    Height as of this encounter: 1.753 m (5' 9\").    " Weight as of this encounter: 84.8 kg (187 lb).    Physical Exam  Constitutional:       Appearance: Normal appearance.   HENT:      Head: Normocephalic and atraumatic.      Right Ear: Tympanic membrane normal.      Left Ear: Tympanic membrane normal.   Eyes:      Extraocular Movements: Extraocular movements intact.      Pupils: Pupils are equal, round, and reactive to light.   Cardiovascular:      Rate and Rhythm: Normal rate and regular rhythm.   Pulmonary:      Effort: Pulmonary effort is normal. No respiratory distress.      Breath sounds: No stridor. No wheezing, rhonchi or rales.   Abdominal:      General: Abdomen is flat. Bowel sounds are normal.      Palpations: Abdomen is soft.   Musculoskeletal:         General: Normal range of motion.      Cervical back: Normal range of motion and neck supple.   Skin:     General: Skin is warm and dry.   Neurological:      General: No focal deficit present.      Mental Status: She is alert and oriented to person, place, and time.   Psychiatric:         Mood and Affect: Mood normal.         Behavior: Behavior normal.                4/3/2025   Mini Cog   Clock Draw Score 2 Normal   3 Item Recall 3 objects recalled   Mini Cog Total Score 5              Signed Electronically by: Verito Ashby MD

## 2025-04-14 ENCOUNTER — OFFICE VISIT (OUTPATIENT)
Dept: FAMILY MEDICINE | Facility: CLINIC | Age: 70
End: 2025-04-14
Payer: MEDICARE

## 2025-04-14 ENCOUNTER — MYC MEDICAL ADVICE (OUTPATIENT)
Dept: FAMILY MEDICINE | Facility: CLINIC | Age: 70
End: 2025-04-14

## 2025-04-14 ENCOUNTER — ANCILLARY PROCEDURE (OUTPATIENT)
Dept: GENERAL RADIOLOGY | Facility: CLINIC | Age: 70
End: 2025-04-14
Attending: FAMILY MEDICINE
Payer: MEDICARE

## 2025-04-14 VITALS
RESPIRATION RATE: 18 BRPM | OXYGEN SATURATION: 96 % | BODY MASS INDEX: 28.34 KG/M2 | HEART RATE: 89 BPM | TEMPERATURE: 97.4 F | DIASTOLIC BLOOD PRESSURE: 78 MMHG | HEIGHT: 68 IN | SYSTOLIC BLOOD PRESSURE: 116 MMHG | WEIGHT: 187 LBS

## 2025-04-14 DIAGNOSIS — M89.8X9 BONE PAIN: ICD-10-CM

## 2025-04-14 DIAGNOSIS — G89.29 CHRONIC RIGHT SHOULDER PAIN: ICD-10-CM

## 2025-04-14 DIAGNOSIS — G89.29 CHRONIC PAIN OF BOTH SHOULDERS: Primary | ICD-10-CM

## 2025-04-14 DIAGNOSIS — M25.511 CHRONIC PAIN OF BOTH SHOULDERS: Primary | ICD-10-CM

## 2025-04-14 DIAGNOSIS — M25.511 CHRONIC RIGHT SHOULDER PAIN: ICD-10-CM

## 2025-04-14 DIAGNOSIS — G89.29 CHRONIC MIDLINE THORACIC BACK PAIN: ICD-10-CM

## 2025-04-14 DIAGNOSIS — M25.512 CHRONIC PAIN OF BOTH SHOULDERS: Primary | ICD-10-CM

## 2025-04-14 DIAGNOSIS — M54.6 CHRONIC MIDLINE THORACIC BACK PAIN: ICD-10-CM

## 2025-04-14 DIAGNOSIS — G89.29 CHRONIC RIGHT SHOULDER PAIN: Primary | ICD-10-CM

## 2025-04-14 DIAGNOSIS — M25.511 CHRONIC RIGHT SHOULDER PAIN: Primary | ICD-10-CM

## 2025-04-14 LAB — ALP SERPL-CCNC: 70 U/L (ref 40–150)

## 2025-04-14 PROCEDURE — 1125F AMNT PAIN NOTED PAIN PRSNT: CPT | Performed by: FAMILY MEDICINE

## 2025-04-14 PROCEDURE — 3078F DIAST BP <80 MM HG: CPT | Performed by: FAMILY MEDICINE

## 2025-04-14 PROCEDURE — 3074F SYST BP LT 130 MM HG: CPT | Performed by: FAMILY MEDICINE

## 2025-04-14 PROCEDURE — 72070 X-RAY EXAM THORAC SPINE 2VWS: CPT | Mod: TC | Performed by: RADIOLOGY

## 2025-04-14 PROCEDURE — 36415 COLL VENOUS BLD VENIPUNCTURE: CPT | Performed by: FAMILY MEDICINE

## 2025-04-14 PROCEDURE — 73030 X-RAY EXAM OF SHOULDER: CPT | Mod: TC | Performed by: RADIOLOGY

## 2025-04-14 PROCEDURE — 99214 OFFICE O/P EST MOD 30 MIN: CPT | Performed by: FAMILY MEDICINE

## 2025-04-14 PROCEDURE — 84075 ASSAY ALKALINE PHOSPHATASE: CPT | Performed by: FAMILY MEDICINE

## 2025-04-14 ASSESSMENT — ENCOUNTER SYMPTOMS
GASTROINTESTINAL NEGATIVE: 1
EYES NEGATIVE: 1
ENDOCRINE NEGATIVE: 1
PSYCHIATRIC NEGATIVE: 1
HEMATOLOGIC/LYMPHATIC NEGATIVE: 1
RESPIRATORY NEGATIVE: 1
BACK PAIN: 1
CONSTITUTIONAL NEGATIVE: 1
ALLERGIC/IMMUNOLOGIC NEGATIVE: 1
CARDIOVASCULAR NEGATIVE: 1
ARTHRALGIAS: 1
NEUROLOGICAL NEGATIVE: 1

## 2025-04-14 ASSESSMENT — PAIN SCALES - GENERAL: PAINLEVEL_OUTOF10: MODERATE PAIN (4)

## 2025-04-14 NOTE — PROGRESS NOTES
Diagnosis Comments   1. Chronic right shoulder pain  XR Shoulder Right G/E 3 Views, Orthopedic  Referral - reviewed x-ray findings with the patient, recommend seeing sports medicine,      2. Chronic midline thoracic back pain  XR Thoracic Spine 2 Views - reviewed x-ray findings with the patient, she has some degenerative changes. May benefit from some cortisone injection. Will need an MRI.       3. Bone pain        Alkaline phosphatase - patient concerned about Paget;s disease , I told her it was unlikely , blood work ordered.    4       Clicking in ears                                                               Recommend watching this closely.probably a benign                                                                                                    symptom        Subjective   Kirti is a 70 year old, presenting for the following health issues:    Patient is a 70-year-old female presenting to the clinic today for mid back pain.  She also has pain in her shins bilaterally.      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  The pain is so severe she has to sit down to take a break.  It does not radiate and is mostly across her mid back.  No recent injury.  No pain with breathing.  No rib pain.  She also has pain in the shin area.  Tenderness is elicited with palpation. She reports that she had been researching and wonders if she had Paget's disease.   Patient also mentioned she has been having some clicking in her ears, this has also been going on for a few months. It seems more noticeable at night, it is more of an annoyance. She does not feel her pulse in her ears. She reports no headaches or neck pain. No pain in her ears. She denies any dizziness or feeling lightheaded.       Pain (Pain in legs and shoulders, sharp pain in right side of neck, mid back pain when standing too long, weakness and bone pain in lower calves, shin  bones are bumpy) and Ear Problem (Clicking in both ears )        4/14/2025    11:07 AM   Additional Questions   Roomed by Meagan Ambrose   Accompanied by self         4/14/2025    11:07 AM   Patient Reported Additional Medications   Patient reports taking the following new medications none     History of Present Illness       Reason for visit:  A few new health concerns  Symptom onset:  More than a month  Symptoms include:  Pain in legs and shoulders  Symptom intensity:  Moderate  Symptom progression:  Staying the same  Had these symptoms before:  Yes  Has tried/received treatment for these symptoms:  No  What makes it worse:  Movement and pressure  What makes it better:  No   She is taking medications regularly.        Pain in mid back when standing too long -  has been going on for about 8 months, no other symptoms. At its worst it is 7/10. For example if she's doing a big load of dishes while standing it aggravates her back.    Clicking in both ears- has been going on for less than a year, no pain, no other symtpoms. Comes and goes, notices it during the day and a little bit more when she is going to sleep at night.     Pain History:  When did you first notice your pain? Less than a year   Have you seen anyone else for your pain? No  How has your pain affected your ability to work? Not applicable  Where in your body do you have pain? Neck Pain  Onset/Duration: less than a year   Description:   Location: right side   Radiation: none  Intensity: moderate  Progression of Symptoms:  intermittent  Accompanying Signs & Symptoms:  Burning, tingling, prickly sensation in arm(s): No  Numbness in arm(s): No  Weakness in arm(s):  YES  Fever: No  Headache: YES  Nausea and/or vomiting: YES- thinks its from IBS  History:   Trauma: No  Previous neck pain: No  Previous surgery or injections: No  Previous Imaging (MRI,X ray): No  Precipitating or alleviating factors: None  Does movement impact the pain:  No  Therapies tried and  "outcome: nothing            Review of Systems   Constitutional: Negative.    HENT:  Positive for ear pain.    Eyes: Negative.    Respiratory: Negative.     Cardiovascular: Negative.    Gastrointestinal: Negative.    Endocrine: Negative.    Genitourinary: Negative.    Musculoskeletal:  Positive for arthralgias and back pain.   Skin: Negative.    Allergic/Immunologic: Negative.    Neurological: Negative.    Hematological: Negative.    Psychiatric/Behavioral: Negative.            Objective    /78 (BP Location: Right arm, Patient Position: Sitting, Cuff Size: Adult Regular)   Pulse 89   Temp 97.4  F (36.3  C) (Tympanic)   Resp 18   Ht 1.72 m (5' 7.72\")   Wt 84.8 kg (187 lb)   SpO2 96%   BMI 28.67 kg/m    Body mass index is 28.67 kg/m .  Physical Exam  Constitutional:       Appearance: Normal appearance.   HENT:      Head: Normocephalic and atraumatic.      Right Ear: Tympanic membrane normal.   Eyes:      Pupils: Pupils are equal, round, and reactive to light.   Cardiovascular:      Rate and Rhythm: Normal rate and regular rhythm.   Pulmonary:      Effort: Pulmonary effort is normal. No respiratory distress.      Breath sounds: Normal breath sounds. No stridor. No wheezing, rhonchi or rales.   Chest:      Chest wall: No tenderness.   Musculoskeletal:         General: Swelling and tenderness present.      Cervical back: Normal range of motion and neck supple.   Skin:     General: Skin is warm and dry.   Neurological:      Mental Status: She is alert and oriented to person, place, and time.   Psychiatric:         Mood and Affect: Mood normal.         Behavior: Behavior normal.            XR Thoracic Spine 2 Views    Result Date: 4/14/2025  EXAM: XR THORACIC SPINE 2 VIEWS LOCATION: [facility] DATE: 4/14/2025 INDICATION: Patient reports mid back pain. Worse with standing. Ongoing for months. COMPARISON: None.     IMPRESSION: Moderate degenerative change. Bones appear demineralized. " No spine fracture appreciated by plain film x-ray. Slight levoconvex curvature.       XR Shoulder Right G/E 3 Views    Result Date: 4/14/2025  EXAM: XR SHOULDER RIGHT G/E 3 VIEWS LOCATION: [facility] DATE: 4/14/2025 INDICATION: Patient reports chronic right shoulder pain. ongoing for months. Range of motion is severely impaired. COMPARISON: None.     IMPRESSION: Normal alignment. No fracture. Moderate degenerative arthritis of the glenohumeral joint. Mild degenerative arthrosis of the AC joint. Degenerative changes in the cervical and thoracic spine.         Signed Electronically by: Verito Ashby MD

## 2025-04-15 ENCOUNTER — PATIENT OUTREACH (OUTPATIENT)
Dept: CARE COORDINATION | Facility: CLINIC | Age: 70
End: 2025-04-15
Payer: MEDICARE

## 2025-04-15 NOTE — TELEPHONE ENCOUNTER
Ortho referral placed. Please inform the patient. We are not affiliated with [facility] she will have to print out the referral and take it to them

## 2025-04-16 ENCOUNTER — PATIENT OUTREACH (OUTPATIENT)
Dept: CARE COORDINATION | Facility: CLINIC | Age: 70
End: 2025-04-16
Payer: MEDICARE

## 2025-04-16 NOTE — TELEPHONE ENCOUNTER
Referral faxed to [facility] [phone] as requested    Abigail Miller on 4/16/2025 at 7:39 AM

## 2025-04-17 ENCOUNTER — PATIENT OUTREACH (OUTPATIENT)
Dept: CARE COORDINATION | Facility: CLINIC | Age: 70
End: 2025-04-17
Payer: MEDICARE

## 2025-05-01 ENCOUNTER — TRANSFERRED RECORDS (OUTPATIENT)
Dept: HEALTH INFORMATION MANAGEMENT | Facility: CLINIC | Age: 70
End: 2025-05-01
Payer: MEDICARE

## 2025-05-06 ENCOUNTER — HOSPITAL ENCOUNTER (OUTPATIENT)
Dept: MAMMOGRAPHY | Facility: CLINIC | Age: 70
Discharge: HOME | End: 2025-05-06
Attending: FAMILY MEDICINE | Admitting: FAMILY MEDICINE
Payer: MEDICARE

## 2025-05-06 DIAGNOSIS — Z12.31 VISIT FOR SCREENING MAMMOGRAM: ICD-10-CM

## 2025-05-06 PROCEDURE — 77063 BREAST TOMOSYNTHESIS BI: CPT

## 2025-05-08 ENCOUNTER — HOSPITAL ENCOUNTER (OUTPATIENT)
Dept: NUCLEAR MEDICINE | Facility: CLINIC | Age: 70
Setting detail: NUCLEAR MEDICINE
Discharge: HOME | End: 2025-05-08
Payer: MEDICARE

## 2025-05-08 DIAGNOSIS — K59.09 CHRONIC CONSTIPATION: ICD-10-CM

## 2025-05-08 DIAGNOSIS — M62.89 PELVIC FLOOR DYSFUNCTION: ICD-10-CM

## 2025-05-08 PROCEDURE — A9541 TC99M SULFUR COLLOID: HCPCS

## 2025-05-08 PROCEDURE — 78264 GASTRIC EMPTYING IMG STUDY: CPT

## 2025-05-08 PROCEDURE — 343N000001 HC RX 343 MED OP 636

## 2025-05-08 RX ADMIN — TECHNETIUM TC 99M SULFUR COLLOID 1 MILLICURIE: KIT at 07:35

## 2025-05-12 ENCOUNTER — HOSPITAL ENCOUNTER (OUTPATIENT)
Dept: GENERAL RADIOLOGY | Facility: CLINIC | Age: 70
Discharge: HOME | End: 2025-05-12
Payer: MEDICARE

## 2025-05-12 DIAGNOSIS — M62.89 PELVIC FLOOR DYSFUNCTION: ICD-10-CM

## 2025-05-12 DIAGNOSIS — K59.09 CHRONIC CONSTIPATION: ICD-10-CM

## 2025-05-12 PROCEDURE — 74018 RADEX ABDOMEN 1 VIEW: CPT

## 2025-05-14 ENCOUNTER — HOSPITAL ENCOUNTER (OUTPATIENT)
Dept: GENERAL RADIOLOGY | Facility: CLINIC | Age: 70
Discharge: HOME | End: 2025-05-14
Payer: MEDICARE

## 2025-05-14 DIAGNOSIS — K59.09 CHRONIC CONSTIPATION: ICD-10-CM

## 2025-05-14 DIAGNOSIS — M62.89 PELVIC FLOOR DYSFUNCTION: ICD-10-CM

## 2025-05-14 PROCEDURE — 74018 RADEX ABDOMEN 1 VIEW: CPT

## 2025-05-16 ENCOUNTER — HOSPITAL ENCOUNTER (OUTPATIENT)
Dept: GENERAL RADIOLOGY | Facility: CLINIC | Age: 70
Discharge: HOME | End: 2025-05-16
Payer: MEDICARE

## 2025-05-16 DIAGNOSIS — K59.09 CHRONIC CONSTIPATION: ICD-10-CM

## 2025-05-16 DIAGNOSIS — M62.89 PELVIC FLOOR DYSFUNCTION: ICD-10-CM

## 2025-05-16 PROCEDURE — 74018 RADEX ABDOMEN 1 VIEW: CPT

## 2025-05-30 ENCOUNTER — HOSPITAL ENCOUNTER (OUTPATIENT)
Dept: ULTRASOUND IMAGING | Facility: CLINIC | Age: 70
Discharge: HOME | End: 2025-05-30
Payer: MEDICARE

## 2025-05-30 DIAGNOSIS — R79.89 ELEVATED D-DIMER: ICD-10-CM

## 2025-05-30 PROCEDURE — 93971 EXTREMITY STUDY: CPT | Mod: RT

## 2025-07-27 ENCOUNTER — HOSPITAL ENCOUNTER (EMERGENCY)
Facility: CLINIC | Age: 70
Discharge: HOME | End: 2025-07-27
Attending: EMERGENCY MEDICINE | Admitting: EMERGENCY MEDICINE
Payer: MEDICARE

## 2025-07-27 ENCOUNTER — APPOINTMENT (OUTPATIENT)
Dept: CT IMAGING | Facility: CLINIC | Age: 70
End: 2025-07-27
Attending: EMERGENCY MEDICINE
Payer: MEDICARE

## 2025-07-27 VITALS
BODY MASS INDEX: 27.11 KG/M2 | DIASTOLIC BLOOD PRESSURE: 96 MMHG | TEMPERATURE: 98.2 F | WEIGHT: 183 LBS | OXYGEN SATURATION: 98 % | HEIGHT: 69 IN | HEART RATE: 77 BPM | RESPIRATION RATE: 18 BRPM | SYSTOLIC BLOOD PRESSURE: 149 MMHG

## 2025-07-27 DIAGNOSIS — M62.830 LUMBAR PARASPINAL MUSCLE SPASM: ICD-10-CM

## 2025-07-27 DIAGNOSIS — S39.012A LUMBAR STRAIN, INITIAL ENCOUNTER: Primary | ICD-10-CM

## 2025-07-27 LAB
ALBUMIN UR-MCNC: NEGATIVE MG/DL
APPEARANCE UR: CLEAR
BILIRUB UR QL STRIP: NEGATIVE
COLOR UR AUTO: ABNORMAL
GLUCOSE UR STRIP-MCNC: NEGATIVE MG/DL
HGB UR QL STRIP: NEGATIVE
KETONES UR STRIP-MCNC: NEGATIVE MG/DL
LEUKOCYTE ESTERASE UR QL STRIP: ABNORMAL
MUCOUS THREADS #/AREA URNS LPF: PRESENT /LPF
NITRATE UR QL: NEGATIVE
PH UR STRIP: 7 [PH] (ref 5–7)
RBC URINE: 3 /HPF
SP GR UR STRIP: 1.02 (ref 1–1.03)
SQUAMOUS EPITHELIAL: 2 /HPF
UROBILINOGEN UR STRIP-MCNC: NORMAL MG/DL
WBC URINE: 12 /HPF

## 2025-07-27 PROCEDURE — 87086 URINE CULTURE/COLONY COUNT: CPT | Performed by: EMERGENCY MEDICINE

## 2025-07-27 PROCEDURE — 81001 URINALYSIS AUTO W/SCOPE: CPT | Performed by: EMERGENCY MEDICINE

## 2025-07-27 PROCEDURE — 99285 EMERGENCY DEPT VISIT HI MDM: CPT | Mod: 25 | Performed by: EMERGENCY MEDICINE

## 2025-07-27 PROCEDURE — 250N000013 HC RX MED GY IP 250 OP 250 PS 637: Performed by: EMERGENCY MEDICINE

## 2025-07-27 PROCEDURE — 74176 CT ABD & PELVIS W/O CONTRAST: CPT

## 2025-07-27 PROCEDURE — 96372 THER/PROPH/DIAG INJ SC/IM: CPT | Performed by: EMERGENCY MEDICINE

## 2025-07-27 PROCEDURE — 250N000011 HC RX IP 250 OP 636: Performed by: EMERGENCY MEDICINE

## 2025-07-27 PROCEDURE — 99283 EMERGENCY DEPT VISIT LOW MDM: CPT | Performed by: EMERGENCY MEDICINE

## 2025-07-27 RX ORDER — OXYCODONE HYDROCHLORIDE 5 MG/1
2.5-5 TABLET ORAL EVERY 6 HOURS PRN
Qty: 12 TABLET | Refills: 0 | Status: SHIPPED | OUTPATIENT
Start: 2025-07-27

## 2025-07-27 RX ORDER — KETOROLAC TROMETHAMINE 15 MG/ML
15 INJECTION, SOLUTION INTRAMUSCULAR; INTRAVENOUS ONCE
Status: COMPLETED | OUTPATIENT
Start: 2025-07-27 | End: 2025-07-27

## 2025-07-27 RX ORDER — OXYCODONE HYDROCHLORIDE 5 MG/1
5 TABLET ORAL ONCE
Refills: 0 | Status: COMPLETED | OUTPATIENT
Start: 2025-07-27 | End: 2025-07-27

## 2025-07-27 RX ADMIN — OXYCODONE HYDROCHLORIDE 5 MG: 5 TABLET ORAL at 10:02

## 2025-07-27 RX ADMIN — KETOROLAC TROMETHAMINE 15 MG: 15 INJECTION, SOLUTION INTRAMUSCULAR; INTRAVENOUS at 10:03

## 2025-07-27 ASSESSMENT — ACTIVITIES OF DAILY LIVING (ADL)
ADLS_ACUITY_SCORE: 41
ADLS_ACUITY_SCORE: 41

## 2025-07-27 ASSESSMENT — COLUMBIA-SUICIDE SEVERITY RATING SCALE - C-SSRS
2. HAVE YOU ACTUALLY HAD ANY THOUGHTS OF KILLING YOURSELF IN THE PAST MONTH?: NO
6. HAVE YOU EVER DONE ANYTHING, STARTED TO DO ANYTHING, OR PREPARED TO DO ANYTHING TO END YOUR LIFE?: NO
1. IN THE PAST MONTH, HAVE YOU WISHED YOU WERE DEAD OR WISHED YOU COULD GO TO SLEEP AND NOT WAKE UP?: NO

## 2025-07-27 NOTE — ED PROVIDER NOTES
History     Chief Complaint   Patient presents with    Back Pain     HPI  Ina Otoole is a 70 year old female with history of lumbar degenerative disc disease, nephrolithiasis, IBS, myalgia and myositis who presents emergency department for evaluation of several days of left low back pain of insidious onset, waxing and waning pain improved by ibuprofen/Advil.  Constant pain exacerbated by changing position or movement, nonradiating and without UTI signs or symptoms or hematuria.  No lower extremity motor or sensory deficit or abnormality.  No abdominal pain.  No fever or chills.  No other acute problems, complaints or concerns.       Previous Records Reviewed:  MR LUMBAR SPINE 11/28/2008      HISTORY: One week history of right low back and leg pain.      COMPARISON: None.      FINDINGS: Numbering of the levels is based on what appear to be five   lumbar type vertebral bodies.  Vertebral body alignment is normal.   There is a unilateral left pars interarticularis defect at L5. No   additional pars defect is seen. There is a just over 1 cm diameter   benign appearing hemangioma in the T11 vertebral body. No other   osseous lesion or abnormal marrow signal intensity is seen.      The conus medullaris terminates at the level of the T12-L1 disc. No   intrathecal abnormality is demonstrated. Adjacent soft tissues are   unremarkable.      Findings by specific level:      T12-L1, L1-L2, and L2-L3: Normal.      L3-L4: There is mild disc dehydration. The disc height is well   preserved. There is a mild disc bulge with superimposed small   broad-based left lateral disc protrusion. This does not result in any   stenosis or nerve root displacement. There are mild degenerative   changes in the facet joints and moderate prominence of the ligamentum   flava.      L4-L5: There is disc dehydration and mild disc height loss. There is a   small broad-based central disc protrusion with corresponding tear of   the annular fibers.  There are moderate degenerative changes in the   facet joints. No stenosis is seen.      L5-S1: There is disc dehydration. The disc height is relatively well   preserved. There is a mild diffuse disc bulge with no focal herniation   seen. No stenosis is present. There are moderate degenerative changes   in the facet joints.      IMPRESSION:   1. Left L5 unilateral pars interarticularis defect.   2. At L3-L4 there is a small left lateral disc protrusion which does not result in any stenosis.   3. At L4-L5 there is a small central disc protrusion with no stenosis demonstrated.     Allergies:  Allergies   Allergen Reactions    Sulfa Antibiotics Hives       Problem List:    Patient Active Problem List    Diagnosis Date Noted    Renal calculus or stone 07/28/2011     Priority: High     Left, with mild hydronephrosis          Vulvar ulcer 12/30/2020     Priority: Medium    Thrombophlebitis leg 08/02/2018     Priority: Medium    IBS (irritable bowel syndrome) 04/24/2012     Priority: Medium     With chronic constipation      CARDIOVASCULAR SCREENING; LDL GOAL LESS THAN 160 10/31/2010     Priority: Medium    Status post total left knee replacement 11/26/2008     Priority: Medium    Myalgia and myositis      Priority: Medium     Dx in 1995 based on pain sx.   Went to [facility] [place], had botox injections into trigger points, went to pain clinic, PT.  Never had any improvement.    Problem list name updated by automated process. Provider to review      Goiter      Priority: Medium     Had aspiration, was on replacement for about a year, hasn't needed since.    Problem list name updated by automated process. Provider to review      Esophageal reflux 02/07/2007     Priority: Medium     Was given script for PPI, but couldn't afford, has been using tums and zantac          Past Medical History:    Past Medical History:   Diagnosis Date    Arthritis 2018    Basal cell carcinoma     Calculus of gallbladder with other  cholecystitis, without mention of obstruction 06/07/2011    Cancer (H) Nov 2022    Goiter        Past Surgical History:    Past Surgical History:   Procedure Laterality Date    ABDOMEN SURGERY      ARTHROSCOPY KNEE      left meniscus repair    ARTHROSCOPY KNEE RT/LT      left - meniscus repair    COLONOSCOPY N/A 08/24/2017    Procedure: COLONOSCOPY;  Colonoscopy  ;  Surgeon: Abhishek Escobedo MD;  Location: [facility]    COLONOSCOPY      COLONOSCOPY N/A 6/14/2023    Procedure: COLONOSCOPY, FLEXIBLE, WITH LESION REMOVAL USING SNARE;  Surgeon: Conner Paul DO;  Location: [facility]    KNEE SURGERY  09/2013    left knee replacement    LAPAROSCOPIC CHOLECYSTECTOMY  06/29/2011    Procedure:LAPAROSCOPIC CHOLECYSTECTOMY; Surgeon:SUMAYA ZAMORANO; Location:[facility]    LAPAROSCOPIC CHOLECYSTECTOMY      REPLACEMENT TOTAL KNEE Right 08/20/2018    Procedure: RIGHT TOTAL KNEE ARTHROPLASTY COMPUTER ASSIST;  Surgeon: Kevin Haji MD;  Location: [facility];  Service:     STONE ANALYSIS  2011    THYROID BIOPSY      TOTAL KNEE ARTHROPLASTY Left     TUBAL LIGATION  1980    VASCULAR SURGERY  2018       Family History:    Family History   Problem Relation Age of Onset    Diabetes Mother     Hypertension Mother     Cerebrovascular Disease Mother     Cancer Mother         melanoma    Dementia Mother     Basal cell carcinoma Mother     Squamous cell carcinoma Mother     Respiratory Father         copd    Cancer - colorectal Father     Bronchitis Father     Hypertension Sister     Multiple Sclerosis Sister     Neurologic Disorder Sister         ms    Diabetes Brother     Hypertension Brother     Breast Cancer Maternal Grandmother     Diabetes Paternal Grandmother     Other - See Comments Daughter         Transverse Myelitis    Hypertension Sister     Asthma No family hx of     Prostate Cancer No family hx of        Social History:  Marital Status:   [5]  Social History     Tobacco Use    Smoking status: Former     " Current packs/day: 0.00     Types: Cigarettes     Quit date: 2000     Years since quittin.4    Smokeless tobacco: Never   Vaping Use    Vaping status: Never Used   Substance Use Topics    Alcohol use: Yes     Comment: 10 times a year..    Drug use: No        Medications:    oxyCODONE (ROXICODONE) 5 MG tablet  aspirin (ASA) 325 MG tablet  bisacodyl (DULCOLAX) 5 MG EC tablet  Cholecalciferol (VITAMIN D3 PO)  clobetasol (TEMOVATE) 0.05 % external ointment  famotidine (PEPCID) 20 MG tablet  hydrOXYzine HCl (ATARAX) 25 MG tablet  Ibuprofen (ADVIL PO)  lactobacillus rhamnosus, GG, (CULTURELL) capsule  linaclotide (LINZESS) 290 MCG capsule  magnesium oxide (MAG-OX) 400 MG tablet  multivitamin (ONE-DAILY) tablet  senna-docusate (SENOKOT-S;PERICOLACE) 8.6-50 MG per tablet        Review of Systems  As mentioned in the HPI, in addition focused review of systems was negative.    Physical Exam   BP: (!) 168/108  Pulse: 76  Temp: 98.2  F (36.8  C)  Resp: 18  Height: 175.3 cm (5' 9\")  Weight: 83 kg (183 lb)  SpO2: 99 %      Physical Exam  Vitals and nursing note reviewed.   Constitutional:       General: She is not in acute distress.     Appearance: Normal appearance. She is not ill-appearing.   Cardiovascular:      Rate and Rhythm: Normal rate and regular rhythm.      Pulses: Normal pulses.   Pulmonary:      Effort: Pulmonary effort is normal. No respiratory distress.   Abdominal:      General: Bowel sounds are normal.      Palpations: There is no pulsatile mass.      Tenderness: There is no abdominal tenderness. There is no right CVA tenderness, left CVA tenderness or rebound.   Musculoskeletal:         General: Tenderness (Lower left back paraspinous muscular tenderness and spasm as diagrammed.) present.      Cervical back: Normal, normal range of motion and neck supple.      Thoracic back: Normal.      Lumbar back: Spasms and tenderness present. No edema or bony tenderness. Normal range of motion.        Back:       " Right lower leg: No edema.      Left lower leg: No edema.   Skin:     General: Skin is warm and dry.      Coloration: Skin is not jaundiced or pale.      Findings: No bruising, erythema, lesion or rash.   Neurological:      General: No focal deficit present.      Mental Status: She is alert and oriented to person, place, and time.      Sensory: No sensory deficit.      Motor: No weakness.   Psychiatric:         Mood and Affect: Mood normal.         Behavior: Behavior normal.         ED Course        Procedures                  Recent Results (from the past 24 hours)   UA with Microscopic reflex to Culture    Specimen: Urine, Clean Catch   Result Value Ref Range    Color Urine Light Yellow Colorless, Straw, Light Yellow, Yellow    Appearance Urine Clear Clear    Glucose Urine Negative Negative mg/dL    Bilirubin Urine Negative Negative    Ketones Urine Negative Negative mg/dL    Specific Gravity Urine 1.017 1.003 - 1.035    Blood Urine Negative Negative    pH Urine 7.0 5.0 - 7.0    Protein Albumin Urine Negative Negative mg/dL    Urobilinogen Urine Normal Normal mg/dL    Nitrite Urine Negative Negative    Leukocyte Esterase Urine Moderate (A) Negative    Mucus Urine Present (A) None Seen /LPF    RBC Urine 3 (H) <=2 /HPF    WBC Urine 12 (H) <=5 /HPF    Squamous Epithelials Urine 2 (H) <=1 /HPF    Narrative    Urine Culture ordered based on laboratory criteria   CT Abdomen Pelvis w/o Contrast    Narrative    EXAM: CT ABDOMEN PELVIS W/O CONTRAST  LOCATION: [facility]  DATE: 7/27/2025    INDICATION: left flank pain  COMPARISON: CT abdomen/pelvis 7/28/2011.  TECHNIQUE: CT scan of the abdomen and pelvis was performed without IV contrast. Multiplanar reformats were obtained. Dose reduction techniques were used.  CONTRAST: None.    FINDINGS:   LOWER CHEST: Mild bibasilar atelectasis and/or scarring.    HEPATOBILIARY: Prior cholecystectomy. Stable hypodensity along the gallbladder fossa which could  represent a cyst or focal steatosis.    PANCREAS: Normal.    SPLEEN: Normal.    ADRENAL GLANDS: Normal.    KIDNEYS/BLADDER: No hydronephrosis or ureteral calculus. Tiny nonobstructing bilateral renal calculi and/or Robb's plaques.    BOWEL: No bowel obstruction. Few colonic diverticula without acute diverticulitis. Normal appendix. No ascites or pneumoperitoneum.    LYMPH NODES: Normal.    VASCULATURE: Normal caliber abdominal aorta with mild calcified atherosclerotic plaque.    PELVIC ORGANS: Normal.    MUSCULOSKELETAL: Thoracolumbar spondylosis. No destructive osseous lesion.      Impression    IMPRESSION:   1.  No hydronephrosis or ureteral calculus. Tiny nonobstructing bilateral renal calculi and/or Robb's plaques.         Medications   ketorolac (TORADOL) injection 15 mg (15 mg Intramuscular $Given 7/27/25 1003)   oxyCODONE (ROXICODONE) tablet 5 mg (5 mg Oral $Given 7/27/25 1002)         Assessments & Plan (with Medical Decision Making)   70-year-old female with history of nephrolithiasis and lumbar degenerative disc disease who presents with approximately 3 days of left low back pain of insidious onset with exam remarkable for significant left lumbar paraspinous muscular spasm which appears to be cause of her pain.  I suspect she sustained a benign strain with subsequent spasm of the left lumbar back. CT shows no other apparent cause for the pain, and incidental nonobstructing tiny bilateral renal calculi. UA was remarkable for moderate LCE, RBC 3 and 2 squamous epithelial cells.  Urine culture pending.  Through shared decision making we elected to defer antibiotic therapy for possible UTI or pyelonephritis as she has no UTI signs or symptoms and pyelonephritis does not fit her clinical picture at at this time, I doubt pyelonephritis or UTI.  We will await urine culture results.  She will continue OTC analgesics, I will make a physical therapy referral for follow-up and Rx small number of oxycodone to  use, and I provided her instructions on prevention of constipation with opiate analgesic use.  She was provided instructions for supportive care and will return as needed for worsened condition or worsening symptoms, or new problems or concerns.  She was instructed to follow-up primary care clinic if her symptoms are not improving over the coming week.      I have reviewed the nursing notes.    I have reviewed the findings, diagnosis, plan and need for follow up with the patient.      New Prescriptions    OXYCODONE (ROXICODONE) 5 MG TABLET    Take 0.5-1 tablets (2.5-5 mg) by mouth every 6 hours as needed for severe pain.       Final diagnoses:   Lumbar strain, initial encounter   Lumbar paraspinal muscle spasm       7/27/2025   [facility] EMERGENCY DEPT       Eleni, Kenneth HAINES MD  07/27/25 [id number]

## 2025-07-28 LAB — BACTERIA UR CULT: NORMAL

## 2025-08-05 ENCOUNTER — THERAPY VISIT (OUTPATIENT)
Dept: PHYSICAL THERAPY | Facility: CLINIC | Age: 70
End: 2025-08-05
Attending: EMERGENCY MEDICINE
Payer: MEDICARE

## 2025-08-05 DIAGNOSIS — M62.830 LUMBAR PARASPINAL MUSCLE SPASM: ICD-10-CM

## 2025-08-05 DIAGNOSIS — S39.012A LUMBAR STRAIN, INITIAL ENCOUNTER: ICD-10-CM

## 2025-08-05 PROCEDURE — 97110 THERAPEUTIC EXERCISES: CPT | Mod: GP

## 2025-08-05 PROCEDURE — 97161 PT EVAL LOW COMPLEX 20 MIN: CPT | Mod: GP

## 2025-08-12 ENCOUNTER — THERAPY VISIT (OUTPATIENT)
Dept: PHYSICAL THERAPY | Facility: CLINIC | Age: 70
End: 2025-08-12
Attending: EMERGENCY MEDICINE
Payer: MEDICARE

## 2025-08-12 DIAGNOSIS — M62.830 LUMBAR PARASPINAL MUSCLE SPASM: ICD-10-CM

## 2025-08-12 DIAGNOSIS — S39.012A LUMBAR STRAIN, INITIAL ENCOUNTER: Primary | ICD-10-CM

## 2025-08-12 PROCEDURE — 97110 THERAPEUTIC EXERCISES: CPT | Mod: GP

## 2025-08-12 PROCEDURE — 97140 MANUAL THERAPY 1/> REGIONS: CPT | Mod: GP

## 2025-08-18 ENCOUNTER — OFFICE VISIT (OUTPATIENT)
Dept: FAMILY MEDICINE | Facility: CLINIC | Age: 70
End: 2025-08-18
Payer: MEDICARE

## 2025-08-18 VITALS
WEIGHT: 190 LBS | DIASTOLIC BLOOD PRESSURE: 85 MMHG | OXYGEN SATURATION: 98 % | TEMPERATURE: 97.7 F | SYSTOLIC BLOOD PRESSURE: 135 MMHG | HEIGHT: 69 IN | RESPIRATION RATE: 16 BRPM | BODY MASS INDEX: 28.14 KG/M2 | HEART RATE: 75 BPM

## 2025-08-18 DIAGNOSIS — M75.101 ROTATOR CUFF TEAR ARTHROPATHY OF RIGHT SHOULDER: ICD-10-CM

## 2025-08-18 DIAGNOSIS — Z01.818 PREOP GENERAL PHYSICAL EXAM: Primary | ICD-10-CM

## 2025-08-18 DIAGNOSIS — M12.811 ROTATOR CUFF TEAR ARTHROPATHY OF RIGHT SHOULDER: ICD-10-CM

## 2025-08-18 DIAGNOSIS — K21.9 GASTROESOPHAGEAL REFLUX DISEASE WITHOUT ESOPHAGITIS: ICD-10-CM

## 2025-08-18 LAB
ANION GAP SERPL CALCULATED.3IONS-SCNC: 10 MMOL/L (ref 7–15)
BASOPHILS # BLD AUTO: 0.05 10E3/UL (ref 0–0.2)
BASOPHILS NFR BLD AUTO: 0.8 %
BUN SERPL-MCNC: 11.1 MG/DL (ref 8–23)
CALCIUM SERPL-MCNC: 9.9 MG/DL (ref 8.8–10.4)
CHLORIDE SERPL-SCNC: 105 MMOL/L (ref 98–107)
CREAT SERPL-MCNC: 0.85 MG/DL (ref 0.51–0.95)
EGFRCR SERPLBLD CKD-EPI 2021: 72 ML/MIN/1.73M2
EOSINOPHIL # BLD AUTO: 0.35 10E3/UL (ref 0–0.7)
EOSINOPHIL NFR BLD AUTO: 5.4 %
ERYTHROCYTE [DISTWIDTH] IN BLOOD BY AUTOMATED COUNT: 12.3 % (ref 10–15)
GLUCOSE SERPL-MCNC: 99 MG/DL (ref 70–99)
HCO3 SERPL-SCNC: 25 MMOL/L (ref 22–29)
HCT VFR BLD AUTO: 42.4 % (ref 35–47)
HGB BLD-MCNC: 14.3 G/DL (ref 11.7–15.7)
IMM GRANULOCYTES # BLD: <0.04 10E3/UL
IMM GRANULOCYTES NFR BLD: 0 %
LYMPHOCYTES # BLD AUTO: 2.73 10E3/UL (ref 0.8–5.3)
LYMPHOCYTES NFR BLD AUTO: 41.9 %
MCH RBC QN AUTO: 30.6 PG (ref 26.5–33)
MCHC RBC AUTO-ENTMCNC: 33.7 G/DL (ref 31.5–36.5)
MCV RBC AUTO: 90.6 FL (ref 78–100)
MONOCYTES # BLD AUTO: 0.67 10E3/UL (ref 0–1.3)
MONOCYTES NFR BLD AUTO: 10.3 %
NEUTROPHILS # BLD AUTO: 2.72 10E3/UL (ref 1.6–8.3)
NEUTROPHILS NFR BLD AUTO: 41.6 %
PLATELET # BLD AUTO: 198 10E3/UL (ref 150–450)
POTASSIUM SERPL-SCNC: 4.3 MMOL/L (ref 3.4–5.3)
RBC # BLD AUTO: 4.68 10E6/UL (ref 3.8–5.2)
SODIUM SERPL-SCNC: 140 MMOL/L (ref 135–145)
WBC # BLD AUTO: 6.52 10E3/UL (ref 4–11)

## 2025-08-18 PROCEDURE — 99214 OFFICE O/P EST MOD 30 MIN: CPT | Performed by: FAMILY MEDICINE

## 2025-08-18 PROCEDURE — 80048 BASIC METABOLIC PNL TOTAL CA: CPT | Performed by: FAMILY MEDICINE

## 2025-08-18 PROCEDURE — 3079F DIAST BP 80-89 MM HG: CPT | Performed by: FAMILY MEDICINE

## 2025-08-18 PROCEDURE — 85025 COMPLETE CBC W/AUTO DIFF WBC: CPT | Performed by: FAMILY MEDICINE

## 2025-08-18 PROCEDURE — 36415 COLL VENOUS BLD VENIPUNCTURE: CPT | Performed by: FAMILY MEDICINE

## 2025-08-18 PROCEDURE — 3075F SYST BP GE 130 - 139MM HG: CPT | Performed by: FAMILY MEDICINE

## 2025-08-19 ENCOUNTER — THERAPY VISIT (OUTPATIENT)
Dept: PHYSICAL THERAPY | Facility: CLINIC | Age: 70
End: 2025-08-19
Attending: EMERGENCY MEDICINE
Payer: MEDICARE

## 2025-08-19 DIAGNOSIS — M62.830 LUMBAR PARASPINAL MUSCLE SPASM: ICD-10-CM

## 2025-08-19 DIAGNOSIS — S39.012A LUMBAR STRAIN, INITIAL ENCOUNTER: Primary | ICD-10-CM

## 2025-08-19 PROCEDURE — 97140 MANUAL THERAPY 1/> REGIONS: CPT | Mod: GP

## 2025-08-19 PROCEDURE — 97110 THERAPEUTIC EXERCISES: CPT | Mod: GP

## 2025-09-02 ENCOUNTER — THERAPY VISIT (OUTPATIENT)
Dept: PHYSICAL THERAPY | Facility: CLINIC | Age: 70
End: 2025-09-02
Attending: EMERGENCY MEDICINE
Payer: MEDICARE

## 2025-09-02 DIAGNOSIS — M62.830 LUMBAR PARASPINAL MUSCLE SPASM: ICD-10-CM

## 2025-09-02 DIAGNOSIS — S39.012A LUMBAR STRAIN, INITIAL ENCOUNTER: Primary | ICD-10-CM

## 2025-09-02 PROCEDURE — 97140 MANUAL THERAPY 1/> REGIONS: CPT | Mod: GP

## 2025-09-02 PROCEDURE — 97110 THERAPEUTIC EXERCISES: CPT | Mod: GP

## (undated) DEVICE — ENDO SNARE EXACTO COLD 9MM LOOP 2.4MMX230CM 00711115

## (undated) DEVICE — ENDO FORCEP ENDOJAW BIOPSY 2.8MMX230CM FB-220U

## (undated) RX ORDER — DEXAMETHASONE SODIUM PHOSPHATE 10 MG/ML
INJECTION, SOLUTION INTRAMUSCULAR; INTRAVENOUS
Status: DISPENSED
Start: 2021-09-30

## (undated) RX ORDER — BUPIVACAINE HYDROCHLORIDE 2.5 MG/ML
INJECTION, SOLUTION EPIDURAL; INFILTRATION; INTRACAUDAL
Status: DISPENSED
Start: 2021-11-04

## (undated) RX ORDER — TRIAMCINOLONE ACETONIDE 40 MG/ML
INJECTION, SUSPENSION INTRA-ARTICULAR; INTRAMUSCULAR
Status: DISPENSED
Start: 2021-11-04

## (undated) RX ORDER — LIDOCAINE HYDROCHLORIDE 10 MG/ML
INJECTION, SOLUTION EPIDURAL; INFILTRATION; INTRACAUDAL; PERINEURAL
Status: DISPENSED
Start: 2021-09-30

## (undated) RX ORDER — LIDOCAINE HYDROCHLORIDE 10 MG/ML
INJECTION, SOLUTION INFILTRATION; PERINEURAL
Status: DISPENSED
Start: 2023-06-14

## (undated) RX ORDER — PROPOFOL 10 MG/ML
INJECTION, EMULSION INTRAVENOUS
Status: DISPENSED
Start: 2023-06-14

## (undated) RX ORDER — GLYCOPYRROLATE 0.2 MG/ML
INJECTION, SOLUTION INTRAMUSCULAR; INTRAVENOUS
Status: DISPENSED
Start: 2023-06-14

## (undated) RX ORDER — LIDOCAINE HYDROCHLORIDE 10 MG/ML
INJECTION, SOLUTION EPIDURAL; INFILTRATION; INTRACAUDAL; PERINEURAL
Status: DISPENSED
Start: 2021-11-04